# Patient Record
Sex: MALE | Race: WHITE
[De-identification: names, ages, dates, MRNs, and addresses within clinical notes are randomized per-mention and may not be internally consistent; named-entity substitution may affect disease eponyms.]

---

## 2017-04-05 ENCOUNTER — HOSPITAL ENCOUNTER (OUTPATIENT)
Dept: HOSPITAL 10 - E/R | Age: 77
Setting detail: OBSERVATION
LOS: 2 days | Discharge: HOME | End: 2017-04-07
Attending: INTERNAL MEDICINE | Admitting: INTERNAL MEDICINE
Payer: MEDICARE

## 2017-04-05 VITALS
WEIGHT: 202.38 LBS | BODY MASS INDEX: 28.97 KG/M2 | WEIGHT: 202.38 LBS | HEIGHT: 70 IN | BODY MASS INDEX: 28.97 KG/M2 | HEIGHT: 70 IN

## 2017-04-05 VITALS — HEART RATE: 96 BPM

## 2017-04-05 DIAGNOSIS — Z86.73: ICD-10-CM

## 2017-04-05 DIAGNOSIS — E78.5: ICD-10-CM

## 2017-04-05 DIAGNOSIS — L23.9: ICD-10-CM

## 2017-04-05 DIAGNOSIS — I48.0: ICD-10-CM

## 2017-04-05 DIAGNOSIS — Z95.5: ICD-10-CM

## 2017-04-05 DIAGNOSIS — I42.1: ICD-10-CM

## 2017-04-05 DIAGNOSIS — I10: ICD-10-CM

## 2017-04-05 DIAGNOSIS — R07.89: Primary | ICD-10-CM

## 2017-04-05 DIAGNOSIS — Z79.01: ICD-10-CM

## 2017-04-05 DIAGNOSIS — I25.10: ICD-10-CM

## 2017-04-05 DIAGNOSIS — R06.02: ICD-10-CM

## 2017-04-05 DIAGNOSIS — Z79.899: ICD-10-CM

## 2017-04-05 DIAGNOSIS — I25.2: ICD-10-CM

## 2017-04-05 LAB
ABNORMAL IP MESSAGE: 1
ADD SCAN DIFF: NO
ANION GAP SERPL CALC-SCNC: 19 MMOL/L (ref 8–16)
APTT BLD: 33.2 SEC (ref 25–35)
BASOPHILS # BLD AUTO: 0 10^3/UL (ref 0–0.1)
BASOPHILS NFR BLD: 0.4 % (ref 0–2)
BUN SERPL-MCNC: 10 MG/DL (ref 7–20)
CALCIUM SERPL-MCNC: 9.4 MG/DL (ref 8.4–10.2)
CHLORIDE SERPL-SCNC: 104 MMOL/L (ref 97–110)
CK MB SERPL-MCNC: 1.23 NG/ML (ref 0–2.4)
CK SERPL-CCNC: 45 IU/L (ref 23–200)
CO2 SERPL-SCNC: 26 MMOL/L (ref 21–31)
CREAT SERPL-MCNC: 0.73 MG/DL (ref 0.61–1.24)
EOSINOPHIL # BLD: 0 10^3/UL (ref 0–0.5)
EOSINOPHIL NFR BLD: 0.2 % (ref 0–7)
ERYTHROCYTE [DISTWIDTH] IN BLOOD BY AUTOMATED COUNT: 14 % (ref 11.5–14.5)
GLUCOSE SERPL-MCNC: 132 MG/DL (ref 70–220)
HCT VFR BLD CALC: 48.7 % (ref 42–52)
HGB BLD-MCNC: 15.8 G/DL (ref 14–18)
INR PPP: 1.34
LYMPHOCYTES # BLD AUTO: 0.3 10^3/UL (ref 0.8–2.9)
LYMPHOCYTES NFR BLD AUTO: 5.7 % (ref 15–51)
MCH RBC QN AUTO: 29.4 PG (ref 29–33)
MCHC RBC AUTO-ENTMCNC: 32.4 G/DL (ref 32–37)
MCV RBC AUTO: 90.7 FL (ref 82–101)
MONOCYTES # BLD: 0.3 10^3/UL (ref 0.3–0.9)
MONOCYTES NFR BLD: 5.9 % (ref 0–11)
NEUTROPHILS # BLD: 4 10^3/UL (ref 1.6–7.5)
NEUTROPHILS NFR BLD AUTO: 87.6 % (ref 39–77)
NRBC # BLD MANUAL: 0 10^3/UL (ref 0–0)
NRBC BLD QL: 0 /100WBC (ref 0–0)
PLATELET # BLD: 147 10^3/UL (ref 140–415)
PMV BLD AUTO: 11.3 FL (ref 7.4–10.4)
POTASSIUM SERPL-SCNC: 3.6 MMOL/L (ref 3.5–5.1)
PROTHROMBIN TIME: 16.7 SEC (ref 12.2–14.2)
PT RATIO: 1.3
RBC # BLD AUTO: 5.37 10^6/UL (ref 4.7–6.1)
SODIUM SERPL-SCNC: 145 MMOL/L (ref 135–144)
TROPONIN I SERPL-MCNC: < 0.012 NG/ML (ref 0–0.12)
TROPONIN I SERPL-MCNC: < 0.012 NG/ML (ref 0–0.12)
WBC # BLD AUTO: 4.6 10^3/UL (ref 4.8–10.8)

## 2017-04-05 PROCEDURE — 85610 PROTHROMBIN TIME: CPT

## 2017-04-05 PROCEDURE — 85025 COMPLETE CBC W/AUTO DIFF WBC: CPT

## 2017-04-05 PROCEDURE — 83735 ASSAY OF MAGNESIUM: CPT

## 2017-04-05 PROCEDURE — 84484 ASSAY OF TROPONIN QUANT: CPT

## 2017-04-05 PROCEDURE — 82553 CREATINE MB FRACTION: CPT

## 2017-04-05 PROCEDURE — 83880 ASSAY OF NATRIURETIC PEPTIDE: CPT

## 2017-04-05 PROCEDURE — 80048 BASIC METABOLIC PNL TOTAL CA: CPT

## 2017-04-05 PROCEDURE — 93005 ELECTROCARDIOGRAM TRACING: CPT

## 2017-04-05 PROCEDURE — 85730 THROMBOPLASTIN TIME PARTIAL: CPT

## 2017-04-05 PROCEDURE — 36415 COLL VENOUS BLD VENIPUNCTURE: CPT

## 2017-04-05 PROCEDURE — 71010: CPT

## 2017-04-05 PROCEDURE — 82550 ASSAY OF CK (CPK): CPT

## 2017-04-05 PROCEDURE — 76705 ECHO EXAM OF ABDOMEN: CPT

## 2017-04-05 PROCEDURE — 99285 EMERGENCY DEPT VISIT HI MDM: CPT

## 2017-04-05 PROCEDURE — 93306 TTE W/DOPPLER COMPLETE: CPT

## 2017-04-05 NOTE — CONS
Date/Time of Note


Date/Time of Note


DATE: 4/5/17 


TIME: 16:36





Assessment/Plan


Assessment/Plan


Chief Complaint/Hosp Course


1.  chest pain- atypical, but has recent pci/nstemi. trend trop/ekg to r/o for 

ACS.  cont cardiac medications, obtain echo. consider stress testing.  

alternative possibility is gi related/gastroenteritis





- cont ticagelor for now, switched from clopidogrel given rash. however no real 

improvement. can continue ticagelor for now


- cont statin


- cont bb


- obtain 2d echo


- serial trop/ekg





2.  Atrial fibrillation.  Paroxysmal, nonvalvular.  The patient on Eliquis for 

stroke prophylaxis given elevated CHADS-VASc score in the setting of htn, age, 

previous stroke.  


- monitor k/mag, 


- cont po metoprolol and increase for rate control


- d/c dilt given possible rash with medication


- cont tele monitoring





3. Hypertrophic obstructive cardiomyopathy.    


- Would be important to limit heart rate given potential outflow obstruction, 

though blood pressure and patient stable at this time.  Avoid diuretic unless 

needed.





4.  Hypertension


- controlled, cont to monitor





5. Hyperlipidemia


- on statin.





6. Recent cerebrovascular accident. 


-  No focal defects at this time. 


-Continue with anticoagulation as above.





7. Rash- ? drug reaction, most recent med changes are addition of diltiazem and 

clopidogrel (which has now been stopped), will trial off diltiazem as well


- consider derm consult/biopsy


Problems:  





Consultation Date/Type/Reason


Admit Date/Time


4/5/2017


Date of Consultation:  Apr 5, 2017


Type of Consultation:  Cardiology


Reason for Consultation


Chest Pain


Referring Provider:  ASAD VAUGHN MD





Hx of Present Illness


Mr. Glez is a pleasant 76-year-old man with a history of hypertrophic 

obstructive cardiomyopathy with increased LVOT gradient, paroxysmal atrial 

fibrillation,CVA, CAD s/p PCI to RCA 2010, LAD 11/2016.  Pt presents today with 

one day of chest pain and sob. Pt states has had sob since switching from 

clopidogrel to ticagelor given upper extremity rash a week ago.  He notices sob 

occuring at rest mostly, no pnd, orthopnea, edema. lasts a few seconds and comes

/goes. Pt also noted sharp epigastric pain/pressure occurring yesterday on/off 

lasting hours, low level however he states he had decreased appetite but no 

other associated symptom until this am where he had n/v and chest pain.  he 

states he presented to ED for further evaluation.  EKG was reviewed, shows rate 

controlled afib with nonspecfic unchanged TWI, LVH. CXR did not show acute 

abnl.  Trop negative x 1.  pt currently cp free without sob. 





Does also have progressive bue macular rash which has mild itching, this has 

been going on for weeks without relief with change from clopidogrel to 

ticagelor.


Constitutional:  no complaints


Eyes:  no complaints


ENT:  no complaints


Respiratory:  shortness of breath


Cardiovascular:  chest pain


Gastrointestinal:  decreased appetite, nausea, vomiting


Genitourinary:  no complaints


Musculoskeletal:  no complaints


Skin:  pruritis, rash


Neurologic:  no complaints


Endocrine:  no complaints


Psychological:  nl mood/affect, no complaints


Immunologic:  no complaints





Past Medical History





1.  Hypertrophic obstructive cardiomyopathy with known outflow obstruction with 

Valsalva up to 200.


2.  Paroxysmal atrial fibrillation.  On Eliquis therapy for stroke prevention.


3.  Hypertension.


4.  Recent CVA.


5.  Hyperlipidemia.


6.  Coronary artery disease, status post PCI in 2010 with drug-eluting stent in 

the right coronary artery, PCI in 2016 with MONIKA to proximal LAD after NSTEMI





Past Surgical History


Past Surgical Hx:  other (angiogram/stent)





Family History


Significant Family History:  other (no cad)





Social History


Alcohol Use:  none


Smoking Status:  Former smoker


Drug Use:  none





Exam/Review of Systems


Vital Signs


Vitals





 Vital Signs








  Date Time  Temp Pulse Resp B/P Pulse Ox O2 Delivery O2 Flow Rate FiO2


 


4/5/17 15:27  90 12 138/91 98 Room Air  


 


4/5/17 12:36       2 


 


4/5/17 12:18 98.2       











Exam


GENERAL:  No apparent distress.  Alert, oriented x3.


HEENT:  Pupils equal, round, react to light.  Oropharynx was clear.  Moist 

mucous membranes.


NECK:  No JVD.


CARDIOVASCULAR:  Irregularly irregular pulse.  Normal S1, S2. III/VI systolic 

ejection murmur, right upper sternal border.


PULMONARY:  Clear to auscultation, anterior, posterior and bilateral fields.


GASTROINTESTINAL:  Nontender, nondistended.  Normal bowel sounds.


EXTREMITIES:  No cyanosis, clubbing or edema.


NEUROLOGIC:  Nonfocal.





Results


Result Diagram:  


4/5/17 1230                                                                    

            4/5/17 1230





Results 24 hrs





Laboratory Tests








Test


  4/5/17


12:30 4/5/17


13:25


 


White Blood Count 4.6  L 


 


Red Blood Count 5.37   


 


Hemoglobin 15.8   


 


Hematocrit 48.7   


 


Mean Corpuscular Volume 90.7   


 


Mean Corpuscular Hemoglobin 29.4   


 


Mean Corpuscular Hemoglobin


Concent 32.4  


  


 


 


Red Cell Distribution Width 14.0   


 


Platelet Count 147   


 


Mean Platelet Volume 11.3  H 


 


Neutrophils % 87.6  H 


 


Lymphocytes % 5.7  L 


 


Monocytes % 5.9   


 


Eosinophils % 0.2   


 


Basophils % 0.4   


 


Nucleated Red Blood Cells % 0.0   


 


Neutrophils # 4.0   


 


Lymphocytes # 0.3  L 


 


Monocytes # 0.3   


 


Eosinophils # 0.0   


 


Basophils # 0.0   


 


Nucleated Red Blood Cells # 0.0   


 


Sodium Level 145  H 


 


Potassium Level 3.6   


 


Chloride Level 104   


 


Carbon Dioxide Level 26   


 


Anion Gap 19  H 


 


Blood Urea Nitrogen 10   


 


Creatinine 0.73   


 


Glucose Level 132   


 


Calcium Level 9.4   


 


Troponin I < 0.012   


 


Prothrombin Time  16.7  H


 


Prothrombin Time Ratio  1.3  


 


INR International Normalized


Ratio 


  1.34  


 


 


Activated Partial


Thromboplast Time 


  33.2  


 











Procedures


Procedures


cxr images reviewed, no acute abnl





ekg per hpi











STEVAN DURANT Apr 5, 2017 17:04

## 2017-04-05 NOTE — RADRPT
PROCEDURE:   XR Chest. 

 

CLINICAL INDICATION:   Chest pain.

 

TECHNIQUE:   Single frontal view. 

 

COMPARISON:   11/15/2016. 

 

FINDINGS:

The lungs are clear. 

The heart size is normal.  There is calcification in the aorta consistent with atherosclerosis. 

There is no pleural effusion. 

There is no pneumothorax.   

 

IMPRESSION:

1.  Clear lungs.

2.  Atherosclerosis.

3.  Otherwise normal chest x-ray.  

 

RPTAT: QQ

_____________________________________________ 

.Bryan Yoo MD, MD           Date    Time 

Electronically viewed and signed by .Bryan Yoo MD, MD on 04/05/2017 13:13 

 

D:  04/05/2017 13:13  T:  04/05/2017 13:13

.R/

## 2017-04-05 NOTE — ERA
ER Documentation


Chief Complaint


Date/Time


DATE: 4/5/17 


TIME: 13:54


Chief Complaint


chest pain with n/v started yesterday





HPI


This is a 76-year-old male who presents to the emergency room for evaluation of 

chest pain and mild shortness of breath.  The patient did state that he was 

nauseous and vomited once yesterday.  He said his chest pain is in the center 

of his chest and described as a pressure-like sensation.  The patient denied 

any radiation of the pain and denied any aggravating or relieving factors for 

his pain.  He does state that he has had chest pain like this before and he was 

diagnosed with a heart attack.  The patient does have a history of atrial 

fibrillation and is on Xarelto at this time.





ROS


All systems reviewed and are negative except as per history of present illness.





Medications


Home Meds


Active Scripts


Pantoprazole* (Pantoprazole*) 40 Mg Tablet., 40 MG PO DAILY@06 for 30 Days, #

30


   Prov:ASAD VAUGHN MD         11/20/16


Diltiazem Hcl* (Cardizem CD*) 180 Mg Cap.sr.24h, 180 MG PO BID for 30 Days, #60


   Prov:ASAD VAUGHN MD         11/20/16


Apixaban* (Eliquis*) 5 Mg Tablet, 5 MG PO BID for 30 Days, #60 TAB


   Prov:ASAD VAUGHN MD         11/20/16


Reported Medications


Ticagrelor* (Brilinta*) 90 Mg Tablet, 90 MG PO Q12, TAB


   4/5/17


Simvastatin* (Zocor*) 40 Mg Tablet, 40 MG PO QHS, #30 TAB


   4/5/17


Metoprolol Tartrate* (Lopressor*) 50 Mg Tab, 75 MG PO BID, #60 TAB


   11/15/16


Discontinued Reported Medications


Pantoprazole* (Pantoprazole*) 40 Mg Tablet., 40 MG PO AC BREAKFAST, TAB


   11/15/16


Apixaban* (Eliquis*) 5 Mg Tablet, 5 MG PO BID, TAB


   11/15/16


Discontinued Scripts


Metoprolol Succinate* (Toprol XL*) 25 Mg Tab.sr.24h, 75 MG PO BID for 30 Days, #

180


   Prov:ASAD VAUGHN MD         11/20/16


Clopidogrel Bisulfate (Clopidogrel) 75 Mg Tablet, 75 MG PO DAILY for 30 Days, #

30 TAB


   Prov:ASAD VAUGHN MD         11/20/16


Atorvastatin* (Atorvastatin*) 40 Mg Tablet, 40 MG PO HS for 30 Days, #30 TAB


   Prov:ASAD VAUGHN MD         11/20/16


Aspirin* (Aspirin* EC) 81 Mg Tablet.dr, 81 MG PO DAILY for 30 Days, #30


   Prov:ASAD VAUGHN MD         11/20/16


Ranitidine Hcl* (Ranitidine Hcl*) 150 Mg Tablet, 150 MG PO HS for 30 Days, #30 

TAB


   Prov:ASAD VAUGHN MD         5/21/16





Allergies


Allergies:  


Coded Allergies:  


     No Known Allergies (Verified  Allergy, Unknown, 4/5/17)





PMhx/Soc


History of Surgery:  Yes


Anesthesia Reaction:  No


Hx Neurological Disorder:  Yes (stroke)


Hx Respiratory Disorders:  No


Hx Cardiac Disorders:  Yes (STENT PLACEMENT 2008, A-FIB, HYPERTENSION)


Hx Psychiatric Problems:  No


Hx Miscellaneous Medical Probl:  Yes (OBESITY)


Hx Alcohol Use:  Yes


Hx Substance Use:  No


Hx Tobacco Use:  No





Physical Exam


Vitals





Vital Signs








  Date Time  Temp Pulse Resp B/P Pulse Ox O2 Delivery O2 Flow Rate FiO2


 


4/5/17 13:43  84 20 146/91 98 Room Air  


 


4/5/17 12:36      Nasal Cannula 2 


 


4/5/17 12:18 98.2 98 20 158/98 99   








Physical Exam


INITIAL VITAL SIGNS: Reviewed by me


GENERAL:  The patient is well developed and appropriate for usual state of 

health in no apparent distress


HEENT: Pupils equal, round, and reactive to light.  EOMI. There is no scleral 

icterus.


NECK:  C-spine is soft and supple, there is no meningismus.  There is no 

cervical lymphadenopathy.


LUNGS:  Clear to auscultation bilaterally. There are no rales, wheezes or 

rhonchi.


HEART: Irregularly irregular rhythm no murmurs, clicks, rubs or gallops.


ABDOMEN:  Soft, non-tender, non-distended.  There are bowel sounds in all four 

quadrants. No rebound or guarding.


EXTREMITIES:  There is no peripheral cyanosis or edema.  No focal swelling or 

erythema.


NEUROLOGICAL:  The patient moves all four extremities with 5/5 strength.  

Cranial nerves II - XII are intact. Normal gait. Alert and oriented


SKIN:  There is no apparent rash or petechiae.


HEME/LYMPHATIC:  There is no evidence of excessive bruising or lymphedema.


PSYCHIATRIC:  The patient does not appear anxious or depressed.


Result Diagram:  


4/5/17 1230                                                                    

            4/5/17 1230





Results 24 hrs





 Laboratory Tests








Test


  4/5/17


12:30


 


White Blood Count 4.610^3/ul 


 


Red Blood Count 5.3710^6/ul 


 


Hemoglobin 15.8g/dl 


 


Hematocrit 48.7% 


 


Mean Corpuscular Volume 90.7fl 


 


Mean Corpuscular Hemoglobin 29.4pg 


 


Mean Corpuscular Hemoglobin


Concent 32.4g/dl 


 


 


Red Cell Distribution Width 14.0% 


 


Platelet Count 75783^3/UL 


 


Mean Platelet Volume 11.3fl 


 


Neutrophils % 87.6% 


 


Lymphocytes % 5.7% 


 


Monocytes % 5.9% 


 


Eosinophils % 0.2% 


 


Basophils % 0.4% 


 


Nucleated Red Blood Cells % 0.0/100WBC 


 


Neutrophils # 4.010^3/ul 


 


Lymphocytes # 0.310^3/ul 


 


Monocytes # 0.310^3/ul 


 


Eosinophils # 0.010^3/ul 


 


Basophils # 0.010^3/ul 


 


Nucleated Red Blood Cells # 0.010^3/ul 


 


Sodium Level 145mmol/L 


 


Potassium Level 3.6mmol/L 


 


Chloride Level 104mmol/L 


 


Carbon Dioxide Level 26mmol/L 


 


Anion Gap 19 


 


Blood Urea Nitrogen 10mg/dl 


 


Creatinine 0.73mg/dl 


 


Glucose Level 132mg/dl 


 


Calcium Level 9.4mg/dl 


 


Troponin I < 0.012ng/ml 








 Current Medications








 Medications


  (Trade)  Dose


 Ordered  Sig/Graciela


 Route


 PRN Reason  Start Time


 Stop Time Status Last Admin


Dose Admin


 


 Ondansetron HCl


  (Zofran Inj)  4 mg  ER BRIDGE PRN


 IV


 NAUSEA AND/OR VOMITING  4/5/17 14:00


 4/6/17 13:59   


 


 


 Acetaminophen


  (Tylenol Tab)  650 mg  ER BRIDGE PRN


 PO


 MILD PAIN/FEVER  4/5/17 14:00


 4/6/17 13:59   


 


 


 Nitroglycerin


  (Nitroglycerin


  (Sl Tab) 0.4 Mg)  1 tab  ONCE  ONCE


 SL


   4/5/17 14:00


 4/5/17 14:01   


 











Procedures/MDM


Chest X-ray 1V Interpreted by me:


Soft Tissue:                                               No acute 

abnormalities


Bones:                                                    No acute abnormalities


Mediastinum/Cardiac Silhouette/Lungs:     [No acute abnormalities]


EKG: 


Rate/Rhythm:             Atrial fibrillation


QRS, ST, T-waves:    [No changes consistent w/ acute ischemia]


Impression:      [No evidence of ischemia or arrhythmia]


EKG: #2


Rate/Rhythm:             Atrial fibrillation


QRS, ST, T-waves:    [No changes consistent w/ acute ischemia]


Impression:      Possible lateral ischemia, not acute





This 76-year-old male presents to the emergency room for evaluation of chest 

pain or palpitations.  This patient has had a previous non-ST elevation 

myocardial infarction.  He states his pain is similar to his previous pain.  

This patient did have an EKG which showed atrial fibrillation which is rate 

controlled, no acute ischemia.  A repeat EKG does not show any evolving 

changes.  The patient had an x-ray done which does not show any acute fluid 

overload.  Lab work does not reveal an elevated troponin.  However given this 

patient's age and previous cardiac history this patient will be placed in for 

admission at this time for serial troponins and possible cardiac evaluation.  I 

have contacted his primary care physician, Dr. Vaughn who is okay with 

our plan of care at this time.  This patient is hemodynamically stable will be 

placed on the telemetry floor.





Departure


Diagnosis:  


 Primary Impression:  


 Chest pain


 Additional Impression:  


 A-fib


Condition:  Fair











DIPAK POLLARD DO Apr 5, 2017 13:57

## 2017-04-06 VITALS — RESPIRATION RATE: 16 BRPM | SYSTOLIC BLOOD PRESSURE: 127 MMHG | DIASTOLIC BLOOD PRESSURE: 95 MMHG

## 2017-04-06 VITALS — DIASTOLIC BLOOD PRESSURE: 75 MMHG | SYSTOLIC BLOOD PRESSURE: 117 MMHG | RESPIRATION RATE: 19 BRPM

## 2017-04-06 VITALS — DIASTOLIC BLOOD PRESSURE: 95 MMHG | SYSTOLIC BLOOD PRESSURE: 136 MMHG | RESPIRATION RATE: 18 BRPM

## 2017-04-06 VITALS — SYSTOLIC BLOOD PRESSURE: 131 MMHG | RESPIRATION RATE: 18 BRPM | DIASTOLIC BLOOD PRESSURE: 77 MMHG

## 2017-04-06 VITALS — HEART RATE: 84 BPM

## 2017-04-06 VITALS — HEART RATE: 83 BPM

## 2017-04-06 VITALS — HEART RATE: 161 BPM

## 2017-04-06 VITALS — HEART RATE: 90 BPM

## 2017-04-06 VITALS — HEART RATE: 96 BPM

## 2017-04-06 VITALS — HEART RATE: 103 BPM

## 2017-04-06 VITALS — RESPIRATION RATE: 20 BRPM | SYSTOLIC BLOOD PRESSURE: 141 MMHG | DIASTOLIC BLOOD PRESSURE: 84 MMHG

## 2017-04-06 VITALS — HEART RATE: 106 BPM

## 2017-04-06 LAB
ANION GAP SERPL CALC-SCNC: 10 MMOL/L (ref 8–16)
BNP SERPL-MCNC: 2450 PG/ML (ref 0–450)
BUN SERPL-MCNC: 11 MG/DL (ref 7–20)
CALCIUM SERPL-MCNC: 8.8 MG/DL (ref 8.4–10.2)
CHLORIDE SERPL-SCNC: 105 MMOL/L (ref 97–110)
CK MB SERPL-MCNC: 1.4 NG/ML (ref 0–2.4)
CK SERPL-CCNC: 78 IU/L (ref 23–200)
CO2 SERPL-SCNC: 27 MMOL/L (ref 21–31)
CREAT SERPL-MCNC: 0.7 MG/DL (ref 0.61–1.24)
GLUCOSE SERPL-MCNC: 97 MG/DL (ref 70–220)
MAGNESIUM SERPL-MCNC: 1.9 MG/DL (ref 1.7–2.5)
POTASSIUM SERPL-SCNC: 3.4 MMOL/L (ref 3.5–5.1)
SODIUM SERPL-SCNC: 139 MMOL/L (ref 135–144)
TROPONIN I SERPL-MCNC: 0.01 NG/ML (ref 0–0.12)
TROPONIN I SERPL-MCNC: 0.01 NG/ML (ref 0–0.12)

## 2017-04-06 RX ADMIN — PANTOPRAZOLE SODIUM SCH MG: 40 TABLET, DELAYED RELEASE ORAL at 06:05

## 2017-04-06 RX ADMIN — APIXABAN SCH MG: 5 TABLET, FILM COATED ORAL at 08:43

## 2017-04-06 RX ADMIN — SUCRALFATE SCH GM: 1 SUSPENSION ORAL at 16:31

## 2017-04-06 RX ADMIN — SUCRALFATE SCH GM: 1 SUSPENSION ORAL at 15:15

## 2017-04-06 RX ADMIN — APIXABAN SCH MG: 5 TABLET, FILM COATED ORAL at 21:04

## 2017-04-06 RX ADMIN — METOPROLOL TARTRATE SCH MG: 100 TABLET, FILM COATED ORAL at 21:05

## 2017-04-06 RX ADMIN — SUCRALFATE SCH GM: 1 SUSPENSION ORAL at 21:04

## 2017-04-06 NOTE — CONS
DATE OF ADMISSION: 04/05/2017

DATE OF CONSULTATION:  

 

 

 

TYPE OF CONSULTATION:  Gastroenterology. 

 

REFERRING PHYSICIAN:  Dr. Kike Mata 

 

Dear Dr. Mata, 

 

Thank you for asking me to evaluate your patient, who is a 76-year-old gentleman with a history of c
oronary artery disease, status post percutaneous coronary artery intervention to the RCA and LAD in 
11/2006, was placed on antiplatelet agent, now has been complaining of retrosternal discomfort and a
lso epigastric pain with vomiting.  Vomiting of 1 day duration.  The patient also developed upper ex
tremity rash which he feels it's all related to medication.  He has good bowel movement.  No GI blee
ding.  ____ afternoon when he received the cocktail of Carafate and simethicone, he feels much liz
r.  His pain has disappeared and was able to eat 100% without any problem.

 

REVIEW OF SYSTEMS:  Otherwise negative.

 

MEDICATIONS:  He is on:

1.  Protonix.

2.  Toprol. 

3.  Diltiazem. 

4.  Eliquis.

5.  Brilinta.

6.  ____.

7.  Metoprolol.

8.  Simvastatin. 

 

PHYSICAL EXAMINATION:

GENERAL:  Well built, nourished, not in distress.

VITAL SIGNS:  Stable.

HEENT:  Unremarkable.

NECK:  Supple.  No thyromegaly, no lymphadenopathy.

CARDIOVASCULAR:  No murmur, gallop or click.

LUNGS:  Clear.

ABDOMEN:  Benign.

EXTREMITIES:  No edema.

CENTRAL NERVOUS SYSTEM:  Grossly within normal limits.  

 

The patient also was evaluated by the cardiologist Dr. Camacho Lemus and was of opinion maybe the 
upper GI tract is playing a role in his discomfort.

 

IMPRESSION:

1.  Atypical chest pain.  Maybe the gastroesophageal reflux disease is atypical chest pain.  So far,
 the cardiac workup has been negative.  Rule out GERD.

2.  Epigastric discomfort, dyspepsia, rule out gastritis.

3.  Atrial fibrillation.

4.  Hypertrophic obstructive cardiomyopathy.

5.  Hypertension.

6.  Hyperlipidemia.

7.  Rashes on the upper extremities from the medication.

8.  Constant belching.  

 

PLAN:  Continue PPI, continue Carafate.  The patient feels much better now, and if despite that he c
ontinues to have the symptoms, then will proceed with EGD.  This patient, I would treat him conserva
tively.

 

 

Dictated By: KRISTINE LEVIN MD

 

PJ/NTS

DD:    04/06/2017 18:53:19

DT:    04/06/2017 20:21:32

Conf#: [id number]

DID#:  [id number]

CC: KIKE MATA MD; CAMACHO LEMUS MD;*EndCC*

## 2017-04-06 NOTE — RADRPT
PROCEDURE:   US Abdomen (right upper quadrant). 

 

CLINICAL INDICATION:   Right upper quadrant abdomen pain.  

 

TECHNIQUE:   Multiple real-time longitudinal and transverse images of the right upper quadrant of th
e abdomen were acquired utilizing a curved array transducer. Images were reviewed on a high-resoluti
on PACS workstation. 

 

COMPARISON:   None 

 

FINDINGS:

The liver is normal in size and diffusely increased in echogenicity consistent with fatty metamorpho
sis. 

There is no focal hepatic lesion.   Color Doppler and pulsed Doppler sonography demonstrate normal a
ntegrade flow in the portal vein. 

The gallbladder is normal with no stones or wall thickening.  There is no pericholecystic fluid leonardo
ection.

The bile ducts are normal with the common bile duct measuring 5.0 mm in diameter.  

The visualized portions of the pancreas are unremarkable with obscuration of the tail of the pancrea
s.  

No free fluid is present.  

The right kidney measures 11.0 cm.  There is normal  echogenicity of the right kidney.  There is no 
perinephric fluid collection.  No hydronephrosis, mass, or calculus is seen.   

 

IMPRESSION:

1.  Fatty metamorphosis of the liver.

2.  Otherwise normal right upper quadrant abdomen ultrasound. 

 

RPTAT: QQ

_____________________________________________ 

.Bryan Yoo MD, MD           Date    Time 

Electronically viewed and signed by .Bryan Yoo MD, MD on 04/06/2017 16:50 

 

D:  04/06/2017 16:50  T:  04/06/2017 16:50

.R/

## 2017-04-06 NOTE — HP
DATE OF ADMISSION: 04/05/2017

 

CHIEF COMPLAINT AND HISTORY OF PRESENT ILLNESS:  The patient is a 76-year-old gentleman, well known 
to me, with a previous history of coronary artery disease, status post percutaneous coronary interve
ntion to the RCA and LAD, LAD, done in November of 2016, presenting with a 1-week history of increas
ing epigastric pain with vomiting on the day of admission, and also chest pain.  The patient had dev
eloped an upper extremity rash and had discussed with Dr. Pierre and the medication was switched fr
om clopidogrel to ticagrelor.  The patient also has been complaining of increasing shortness of lisseth
th.  The patient was last hospitalized in November of 2016 for atrial fibrillation with rapid ventri
cular response.  Previous hospitalization was 2 months before that, for atypical chest pain secondar
y to acute gastritis. In May of 2016 the patient had left homonymous hemianopsia, from which he comp
letely recovered.

 

REVIEW OF SYSTEMS:

HEAD:  No history of headaches, focal weakness, or numbness.

EYES:  No blurry vision or glaucoma.  Wears glasses.

ENT:  Noncontributory.

NECK:  No history of thyroid disease.

CHEST:  No bronchitis, hay fever, or asthma.  The patient does not smoke.

HEART:  As above.  A history of Lexiscan done several months back which showed no evidence of a perf
usion defect.  History of hypertrophic obstructive cardiomyopathy with paroxysmal atrial fibrillatio
n. He was hospitalized in November with acute MI with atrial fibrillation, when he underwent PTCA of
 the proximal LAD with a drug-eluting stent.

GASTROINTESTINAL:  No constipation, diarrhea or change in bowel habits.  No history of hematemesis o
r melena.  He does complain of recurrent epigastric discomfort after eating.  Sometimes feels that t
he food is not going down, with peristalsis, but no definite dysphagia.

GENITOURINARY:  No dysuria, hematuria or kidney stones.

 

ALLERGIES:  NO KNOWN ALLERGIES.

 

MEDICATIONS:

1.  Protonix 40 mg p.o. daily.

2.  Toprol-XL 25 mg p.o. b.i.d.

3.  Diltiazem.

4.  Protonix 40 mg p.o. daily.

5.  Eliquis 5 mg b.i.d.

6.  Brilinta 90 mg q. 12.

7.  Cardizem 100 mg b.i.d.

8.  Metoprolol tartrate 75 mg b.i.d.

9.  Simvastatin 40 mg p.o. daily.

 

PHYSICAL EXAMINATION:

GENERAL:  The patient is an average-built male, who is presently in no acute distress.

VITAL SIGNS:  Heart rate 106 per minute, blood pressure 136/95, O2 saturation 100% on room air.

HEENT:  Head normocephalic.  No pallor, cyanosis, or icterus.  Tongue is moist.

NECK:  Supple.  No thyromegaly, bruits or lymphadenopathy.

LUNGS:  Clinically clear.

HEART:  S1, S2, with no rubs or gallops.

ABDOMEN:  Obese, nontender.  No hepatosplenomegaly.

EXTREMITIES:  No edema.  Pedals are poorly palpable.  Homans sign is negative.

NEUROLOGIC:  No localizing or lateralizing signs.

RECTAL:  Deferred at the patient's request.

 

LABORATORY DATA:  Initial WBC count 4.6, hematocrit 48.7, platelet count 147,000.  Sodium 144, potas
sium 3.6, BUN 10, creatinine 0.73.  Troponin 0.01.  BNP is 2450.  Chest x-ray shows lung fields are 
clear, evidence of atherosclerosis and calcification of the aorta.

 

IMPRESSION:

1.  Atypical chest pain with underlying coronary artery disease, likely has significant component of
 GI, including regurgitation and esophagitis. So far, no evidence of acute myocardial infarction.

2.  Atrial fibrillation, paroxysmal, non-valvular.

3.  Hypertrophic obstructive cardiomyopathy.

4.  Hypertension.

5.  Hyperlipidemia.

6.  RASH ON UPPER EXTREMITIES, PROBABLY RELATED TO MEDICATION.

 

PLAN:  Will start the patient on Carafate 1 gram p.o. q.i.d.  Obtain an abdominal ultrasound.  Will 
also request a GI consultation with Dr. Lr.  Consider an upper endoscopy.  Follow recommendation
s from cardiology by Dr. Lemus.  Will also consider getting a dermatology consultation.

 

 

Dictated By: ASAD VAUGHN MD, SR/RENE

DD:    04/06/2017 12:01:57

DT:    04/06/2017 13:19:19

Conf#: [id number]

DID#:  [id number]

## 2017-04-06 NOTE — RADRPT
Echocardiogram Report

 

Patient Name:  JEANIE SIDDIQI   Gender:       Male

MRN:           [id number]         Accession #:  [id number]

Birth Date:    1940     Study Date:   06-Apr-2017

Sonographer:   JOSE Krueger [facility]    Location:     [id number]

 

Ref. Physician: CAMACHO DURANT

Quality: Adequate

 

Procedures: Transthoracic echocardiogram with complete 2D, M-Mode, and 

doppler examination.

Indications: Chest Pain.

 

2D/M Mode                          Doppler

Measurement  Value  Normal Ranges  Measurement    Value  Normal Ranges

LVIDd 2D     6.2    3.5 - 5.6 cm   LESLI Vmax       1.8    cm2

LVIDs 2D     4.4    2.1 - 4.1 cm   LESLI VTI        1.8    cm2

LVPWd 2D     1.1    0.6 - 1.1 cm   AV Mean Neto    2.1    m/sec

IVSd 2D      1.2    0.6 - 1.1 cm   AV Mean PG     22.5   mmHg

AoR Diam 2D  3.3    2.0 - 3.7 cm   AV Peak Neto    3.5    m/sec

EDV 2D       191.9  cm3            AV Peak PG     47.9   mmHg

ESV 2D       84.9   cm3            AV VTI         65.6   cm

LA Dimen 2D  5.3    2.3 - 4.0 cm   AI Peak PG     45.5   mmHg

LVOT Diam    2.3    cm             AI Peak Neto    3.4    m/sec

AI PHT         864.3  msec

LVOT Mean Neto  1.1    m/sec

LVOT Mean PG   5.3    mmHg

LVOT Peak Neto  1.5    m/sec

LVOT VTI       31.6   cm

MV PHT         115.1  msec

MV Peak Neto    1.8    m/sec

MV Peak PG     13.3   mmHg

MV Mean Neto    1.0    m/sec

MV Mean PG     5.3    mmHg

MV PHT         115.1  msec

MV VTI         39.5   cm

MVA PHT        1.9    cm2

MVA VTI        3.2    cm

TR Peak Neto    2.7    m/sec

TR Peak PG     28.4   mmHg

RVSP           31.0   mmHg

 

Findings

Left Ventricle: Hyperdynamic left ventricular systolic function.  Severe 

asymmetric septal hypertrophy.  Mild concentric left ventricular 

hypertrophy.  Mild enlargement of left ventricle cavity.  Ejection 

fraction is visually estimated at 70 %.  Resting left ventricular 

outflow tract velocity 3.46 m/sec.  Resting left ventricular outflow 

tract gradient 47.9 mmHg.

Right Ventricle: Normal right ventricular size.  Normal right 

ventricular systolic function.

Left Atrium: There is severe enlargement of left atrium.

Right Atrium: The right atrium is normal in size.

Mitral Valve: Mild mitral leaflet calcification.  Moderate mitral 

annular calcification.  Anterior flail Mitral Valve leaflet.  Mild 

mitral valve regurgitation.

Aortic Valve: Aortic cusps appear moderately calcified.  Mild to 

moderate aortic valve regurgitation.

Tricuspid Valve: Normal appearance of the tricuspid valve.  Estimated 

peak PA systolic pressure 31 mmHg.  There is mild tricuspid 

regurgitation.

Pulmonic Valve: Normal pulmonic valve appearance.

Pericardium: Normal pericardium with no significant pericardial effusion.

Aorta: Normal aortic root.

IVC: Normal size and normal respiratory collapse consistent with normal 

right atrial pressure.

 

Conclusions

Hyperdynamic left ventricular systolic function.  Severe asymmetric 

septal hypertrophy.  Mild concentric left ventricular hypertrophy.  Mild 

enlargement of left ventricle cavity.  Ejection fraction is visually 

estimated at 70 %.  Resting left ventricular outflow tract velocity 3.46 

m/sec.  Resting left ventricular outflow tract gradient 47.9 mmHg.

Normal right ventricular size.  Normal right ventricular systolic 

function.

There is severe enlargement of left atrium.

Mild mitral leaflet calcification.  Moderate mitral annular 

calcification.  Anterior flail Mitral Valve leaflet.  Mild mitral valve 

regurgitation.

Aortic cusps appear moderately calcified.  Mild to moderate aortic valve 

regurgitation.

Normal appearance of the tricuspid valve.  Estimated peak PA systolic 

pressure 31 mmHg.  There is mild tricuspid regurgitation.

Normal size and normal respiratory collapse consistent with normal right 

atrial pressure.

Normal pericardium with no significant pericardial effusion.

No Vegetation, masses, or thrombi seen.

 

Electronically Signed By:

Camacho Durant

06-Apr-2017 20:48:40  -0700

 

Patient Name: JEANIE SIDDIQI

MRN: [id number]

Study Date: 06-Apr-2017

 

[id number]

## 2017-04-06 NOTE — CONS
Date/Time of Note


Date/Time of Note


DATE: 4/6/17 


TIME: 11:06





Assessment/Plan


Assessment/Plan


Chief Complaint/Hosp Course


1.  chest pain- negative for ACS, films reviewed, had proximal LAD stent. very 

early for ISR, no e/o stent thrombosis given unchanged ekg/no trop elevation.  

cont cardiac medications, obtain echo.  alternative possibility is gi related/

gastroenteritis. 





- cont ticagelor for now, switched from clopidogrel given rash. however no real 

improvement. can continue ticagelor for now


- cont statin


- cont bb


- would not add nitrate given HCM


- obtain 2d echo


- add gi cocktail, if above negative consider gi consultation. 





2.  Atrial fibrillation.  Paroxysmal, nonvalvular.  The patient on Eliquis for 

stroke prophylaxis given elevated CHADS-VASc score in the setting of htn, age, 

previous stroke.  


- monitor k/mag, 


- cont po metoprolol and increase for rate control


- d/c dilt given possible rash with medication


- cont tele monitoring





3. Hypertrophic obstructive cardiomyopathy.    


- Would be important to limit heart rate given potential outflow obstruction, 

though blood pressure and patient stable at this time.  Avoid diuretic unless 

needed.





4.  Hypertension


- controlled, cont to monitor





5. Hyperlipidemia


- on statin.





6. Recent cerebrovascular accident. 


-  No focal defects at this time. 


-Continue with anticoagulation as above.





7. Rash- ? drug reaction, most recent med changes are addition of diltiazem and 

clopidogrel (which has now been stopped), will trial off diltiazem as well


- d/c dilt


- consider derm consult/biopsy


Problems:  





Consultation Date/Type/Reason


Admit Date/Time


Apr 5, 2017 at 14:07


Initial Consult Date


4/5/17


Type of Consultation:  Cardiology


Referring Provider:  ASAD VAUGHN MD





24 HR Interval Summary


Free Text/Dictation


pt r/o for ACS with serial neg trop. however cont to have constant low level 

chest/epigastric pain. no n/v, tolerating po. no sob, diaphoresis





tele reviewed: afib rates 80s-100s





Exam/Review of Systems


Vital Signs


Vitals





 Vital Signs








  Date Time  Temp Pulse Resp B/P Pulse Ox O2 Delivery O2 Flow Rate FiO2


 


4/6/17 09:00  161      


 


4/6/17 07:30 97.6  18 136/95 100   


 


4/5/17 22:01      Room Air  


 


4/5/17 12:36       2 














 Intake and Output   


 


 4/5/17 4/5/17 4/6/17





 14:59 22:59 06:59


 


Intake Total   600 ml


 


Balance   600 ml











Exam


GENERAL:  No apparent distress.  Alert, oriented x3.


HEENT:  Pupils equal, round, react to light.  Oropharynx was clear.  Moist 

mucous membranes.


NECK:  No JVD.


CARDIOVASCULAR:  Irregularly irregular pulse.  Normal S1, S2. III/VI systolic 

ejection murmur, right upper sternal border.


PULMONARY:  Clear to auscultation, anterior, posterior and bilateral fields.


GASTROINTESTINAL:  Nontender, nondistended.  Normal bowel sounds.


EXTREMITIES:  No cyanosis, clubbing or edema.


NEUROLOGIC:  Nonfocal.





Results


Result Diagram:  


4/5/17 1230                                                                    

            4/6/17 0634





Results 24 hrs





Laboratory Tests








Test


  4/5/17


12:30 4/5/17


13:25 4/5/17


18:30 4/6/17


00:30


 


White Blood Count 4.6  L   


 


Red Blood Count 5.37     


 


Hemoglobin 15.8     


 


Hematocrit 48.7     


 


Mean Corpuscular Volume 90.7     


 


Mean Corpuscular Hemoglobin 29.4     


 


Mean Corpuscular Hemoglobin


Concent 32.4  


  


  


  


 


 


Red Cell Distribution Width 14.0     


 


Platelet Count 147     


 


Mean Platelet Volume 11.3  H   


 


Neutrophils % 87.6  H   


 


Lymphocytes % 5.7  L   


 


Monocytes % 5.9     


 


Eosinophils % 0.2     


 


Basophils % 0.4     


 


Nucleated Red Blood Cells % 0.0     


 


Neutrophils # 4.0     


 


Lymphocytes # 0.3  L   


 


Monocytes # 0.3     


 


Eosinophils # 0.0     


 


Basophils # 0.0     


 


Nucleated Red Blood Cells # 0.0     


 


Sodium Level 145  H   


 


Potassium Level 3.6     


 


Chloride Level 104     


 


Carbon Dioxide Level 26     


 


Anion Gap 19  H   


 


Blood Urea Nitrogen 10     


 


Creatinine 0.73     


 


Glucose Level 132     


 


Calcium Level 9.4     


 


Troponin I < 0.012    < 0.012   0.014  


 


Prothrombin Time  16.7  H  


 


Prothrombin Time Ratio  1.3    


 


INR International Normalized


Ratio 


  1.34  


  


  


 


 


Activated Partial


Thromboplast Time 


  33.2  


  


  


 


 


Creatine Kinase   45   78  


 


Creatine Kinase Index   2.7   1.8  


 


Creatinine Kinase MB (Mass)   1.23   1.40  














Test


  4/6/17


06:34 


  


  


 


 


Sodium Level 139     


 


Potassium Level 3.4  L   


 


Chloride Level 105     


 


Carbon Dioxide Level 27     


 


Anion Gap 10  #   


 


Blood Urea Nitrogen 11     


 


Creatinine 0.70     


 


Glucose Level 97     


 


Calcium Level 8.8     


 


Magnesium Level 1.9     


 


Troponin I 0.012     


 


B-Type Natriuretic Peptide 2450  H   











Medications


Medications





 Current Medications


Apixaban (Eliquis) 5 mg BID PO  Last administered on 4/6/17at 08:43; Admin Dose 

5 MG;  Start 4/6/17 at 09:00


Pantoprazole (Protonix Tab) 40 mg DAILY@06 PO  Last administered on 4/6/17at 06:

05; Admin Dose 40 MG;  Start 4/6/17 at 06:00


Ticagrelor (Brilinta) 90 mg Q12 PO  Last administered on 4/6/17at 08:42; Admin 

Dose 90 MG;  Start 4/6/17 at 09:00


Atorvastatin Calcium (Lipitor) 20 mg DAILY@21 PO ;  Start 4/6/17 at 21:00


Zolpidem Tartrate (Ambien) 5 mg HS  PRN PO INSOMNIA;  Start 4/6/17 at 00:00


Metoprolol Tartrate (Lopressor) 100 mg BID PO ;  Start 4/6/17 at 21:00











STEVAN DURANT Apr 6, 2017 11:09

## 2017-04-07 VITALS — SYSTOLIC BLOOD PRESSURE: 126 MMHG | DIASTOLIC BLOOD PRESSURE: 69 MMHG | RESPIRATION RATE: 20 BRPM

## 2017-04-07 VITALS — SYSTOLIC BLOOD PRESSURE: 107 MMHG | RESPIRATION RATE: 18 BRPM | DIASTOLIC BLOOD PRESSURE: 68 MMHG

## 2017-04-07 VITALS — SYSTOLIC BLOOD PRESSURE: 113 MMHG | RESPIRATION RATE: 18 BRPM | DIASTOLIC BLOOD PRESSURE: 74 MMHG

## 2017-04-07 VITALS — SYSTOLIC BLOOD PRESSURE: 116 MMHG | RESPIRATION RATE: 20 BRPM | DIASTOLIC BLOOD PRESSURE: 57 MMHG

## 2017-04-07 VITALS — HEART RATE: 94 BPM

## 2017-04-07 VITALS — HEART RATE: 77 BPM

## 2017-04-07 VITALS — HEART RATE: 93 BPM

## 2017-04-07 VITALS — HEART RATE: 82 BPM

## 2017-04-07 LAB
ABNORMAL IP MESSAGE: 1
ADD SCAN DIFF: NO
ANION GAP SERPL CALC-SCNC: 16 MMOL/L (ref 8–16)
BASOPHILS # BLD AUTO: 0 10^3/UL (ref 0–0.1)
BASOPHILS NFR BLD: 0.6 % (ref 0–2)
BUN SERPL-MCNC: 12 MG/DL (ref 7–20)
CALCIUM SERPL-MCNC: 8.8 MG/DL (ref 8.4–10.2)
CHLORIDE SERPL-SCNC: 103 MMOL/L (ref 97–110)
CO2 SERPL-SCNC: 25 MMOL/L (ref 21–31)
CREAT SERPL-MCNC: 0.75 MG/DL (ref 0.61–1.24)
EOSINOPHIL # BLD: 0 10^3/UL (ref 0–0.5)
EOSINOPHIL NFR BLD: 0.6 % (ref 0–7)
ERYTHROCYTE [DISTWIDTH] IN BLOOD BY AUTOMATED COUNT: 13.6 % (ref 11.5–14.5)
GLUCOSE SERPL-MCNC: 90 MG/DL (ref 70–220)
HCT VFR BLD CALC: 46.7 % (ref 42–52)
HGB BLD-MCNC: 15.2 G/DL (ref 14–18)
LYMPHOCYTES # BLD AUTO: 0.3 10^3/UL (ref 0.8–2.9)
LYMPHOCYTES NFR BLD AUTO: 8.8 % (ref 15–51)
MAGNESIUM SERPL-MCNC: 2.1 MG/DL (ref 1.7–2.5)
MCH RBC QN AUTO: 29.8 PG (ref 29–33)
MCHC RBC AUTO-ENTMCNC: 32.5 G/DL (ref 32–37)
MCV RBC AUTO: 91.6 FL (ref 82–101)
MONOCYTES # BLD: 0.4 10^3/UL (ref 0.3–0.9)
MONOCYTES NFR BLD: 12.8 % (ref 0–11)
NEUTROPHILS # BLD: 2.5 10^3/UL (ref 1.6–7.5)
NEUTROPHILS NFR BLD AUTO: 76.9 % (ref 39–77)
NRBC # BLD MANUAL: 0 10^3/UL (ref 0–0)
NRBC BLD QL: 0 /100WBC (ref 0–0)
PLATELET # BLD: 118 10^3/UL (ref 140–415)
PMV BLD AUTO: 11.9 FL (ref 7.4–10.4)
POTASSIUM SERPL-SCNC: 3.3 MMOL/L (ref 3.5–5.1)
RBC # BLD AUTO: 5.1 10^6/UL (ref 4.7–6.1)
SODIUM SERPL-SCNC: 141 MMOL/L (ref 135–144)
WBC # BLD AUTO: 3.3 10^3/UL (ref 4.8–10.8)

## 2017-04-07 RX ADMIN — METOPROLOL TARTRATE SCH MG: 100 TABLET, FILM COATED ORAL at 09:29

## 2017-04-07 RX ADMIN — PANTOPRAZOLE SODIUM SCH MG: 40 TABLET, DELAYED RELEASE ORAL at 05:17

## 2017-04-07 RX ADMIN — SUCRALFATE SCH GM: 1 SUSPENSION ORAL at 09:26

## 2017-04-07 RX ADMIN — SUCRALFATE SCH GM: 1 SUSPENSION ORAL at 12:27

## 2017-04-07 RX ADMIN — APIXABAN SCH MG: 5 TABLET, FILM COATED ORAL at 09:28

## 2017-04-07 NOTE — RADRPT
Vent Rate: 103 bpm

RR Interval: 0 msec

AR Interval: 0 msec

QRS Duration: 126 msec

QT Interval: 378 msec

QTC Interval: 495 msec

P-R-T Axis: 0 - -21 - 113 degrees

 

 Atrial fibrillation with rapid ventricular response

 Left ventricular hypertrophy with QRS widening

 T wave abnormality, consider lateral ischemia or digitalis effect

 Abnormal ECG

 

Electronically Signed By: Jovanni Maxwell

[id number]

## 2017-04-07 NOTE — DS
DATE OF ADMISSION: 04/05/2017

DATE OF DISCHARGE: 04/07/2017

 

FINAL DIAGNOSES:

1.  Atypical chest pain, likely secondary to reflux esophagitis.

2.  Underlying coronary artery disease status post PTCA of the left anterior descending.

3.  Atrial fibrillation, paroxysmal, non-valvular.

4.  Hypertrophic obstructive cardiomyopathy.

5.  Hypertension.

6.  Hyperlipidemia.

7.  Allergic dermatitis.

 

HOSPITAL COURSE:  The patient is a 76-year-old gentleman well known to me from previous history of c
oronary artery disease status post PTCA of the RCA and LAD presenting with epigastric pain and vomit
ing on the day of admission chest pain.  The patient was admitted from the emergency room, repeat se
rial troponins were negative.  The patient was seen by Dr. Lemus from cardiology standpoint.  The
re was no evidence of acute myocardial infarction.  In addition to pantoprazole, Carafate was added 
with complete resolution of symptoms.  The patient also seen in GI consultation, Dr. Lr who michelle
mmended endoscopy if the patient did not improve over the course of the next 4 weeks.  All the medic
ations were continued, except for diltiazem.

 

DISCHARGE MEDICATIONS: Include:

1.  Protonix 40 mg daily.

2.  Metoprolol 100 mg p.o. b.i.d.

3.  Eliquis 5 mg b.i.d.

4.  Brilinta 90 mg q.12h.

5.  Simvastatin 40 mg p.o. daily.

6.  Carafate 1 gram p.o. q.i.d. 

 

The patient will be followed in the office over the course of next 2 weeks.

 

DISCHARGE CONDITION:  Much improved.

 

 

Dictated By: ASAD VAUGHN MD, SR/NTS

DD:    04/07/2017 14:36:18

DT:    04/07/2017 15:12:18

Conf#: [id number]

DID#:  [id number]

## 2017-04-07 NOTE — CONS
Date/Time of Note


Date/Time of Note


DATE: 4/7/17 


TIME: 18:08





Assessment/Plan


Assessment/Plan


Chief Complaint/Hosp Course


1.  chest pain- negative for ACS, films reviewed, had proximal LAD stent. very 

early for ISR, no e/o stent thrombosis given unchanged ekg/no trop elevation.  

cont cardiac medications, likely more gi related, resolved with gi cocktail. gi 

consulting





- cont ticagelor, after 6 mo post pci will swtich back to asa 81mg daily


- cont statin


- cont bb


- would not add nitrate given HCM


- cont gi cocktail and f/u with gi 





2.  Atrial fibrillation.  Paroxysmal, nonvalvular.  The patient on Eliquis for 

stroke prophylaxis given elevated CHADS-VASc score in the setting of htn, age, 

previous stroke.  


- monitor k/mag, 


- cont po metoprolol and increase for rate control


- d/c dilt given possible rash with medication


- cont tele monitoring





3. Hypertrophic obstructive cardiomyopathy.    


- Would be important to limit heart rate given potential outflow obstruction, 

though blood pressure and patient stable at this time.  Avoid diuretic unless 

needed.





4.  Hypertension


- controlled, cont to monitor





5. Hyperlipidemia


- on statin.





6. Recent cerebrovascular accident. 


-  No focal defects at this time. 


-Continue with anticoagulation as above.





7. Rash- ? drug reaction, most recent med changes are addition of diltiazem and 

clopidogrel (which has now been stopped), will trial off diltiazem as well


- d/c dilt


- consider derm consult/biopsy if no improvement


Problems:  





Consultation Date/Type/Reason


Admit Date/Time


Apr 5, 2017 at 14:07


Initial Consult Date


4/5/17


Type of Consultation:  Cardiology


Referring Provider:  ASAD VAUGHN MD





24 HR Interval Summary


Free Text/Dictation


pt seen early this am.  states pain relieved with gi cocktail. no cp/sob/

palpitations. tolerating po no n/v. no palpitations, dizziness





tele reviewed: afib hrs 90s-100s.





Exam/Review of Systems


Vital Signs


Vitals





 Vital Signs








  Date Time  Temp Pulse Resp B/P Pulse Ox O2 Delivery O2 Flow Rate FiO2


 


4/7/17 12:11  82      


 


4/7/17 11:29 97.5  18 113/74 96   


 


4/5/17 22:01      Room Air  


 


4/5/17 12:36       2 














 Intake and Output   


 


 4/6/17 4/6/17 4/7/17





 15:00 23:00 07:00


 


Intake Total  1500 ml 450 ml


 


Balance  1500 ml 450 ml











Exam


GENERAL:  No apparent distress.  Alert, oriented x3.


HEENT:  Pupils equal, round, react to light.  Oropharynx was clear.  Moist 

mucous membranes.


NECK:  No JVD.


CARDIOVASCULAR:  Irregularly irregular pulse.  Normal S1, S2. III/VI systolic 

ejection murmur, right upper sternal border.


PULMONARY:  Clear to auscultation, anterior, posterior and bilateral fields.


GASTROINTESTINAL:  Nontender, nondistended.  Normal bowel sounds.


EXTREMITIES:  No cyanosis, clubbing or edema.


NEUROLOGIC:  Nonfocal.





Results


Result Diagram:  


4/7/17 0658                                                                    

            4/7/17 0658





Results 24 hrs





Laboratory Tests








Test


  4/7/17


06:58


 


White Blood Count 3.3  #L


 


Red Blood Count 5.10  


 


Hemoglobin 15.2  


 


Hematocrit 46.7  


 


Mean Corpuscular Volume 91.6  


 


Mean Corpuscular Hemoglobin 29.8  


 


Mean Corpuscular Hemoglobin


Concent 32.5  


 


 


Red Cell Distribution Width 13.6  


 


Platelet Count 118  L


 


Mean Platelet Volume 11.9  H


 


Neutrophils % 76.9  


 


Lymphocytes % 8.8  L


 


Monocytes % 12.8  H


 


Eosinophils % 0.6  


 


Basophils % 0.6  


 


Nucleated Red Blood Cells % 0.0  


 


Neutrophils # 2.5  


 


Lymphocytes # 0.3  L


 


Monocytes # 0.4  


 


Eosinophils # 0.0  


 


Basophils # 0.0  


 


Nucleated Red Blood Cells # 0.0  


 


Sodium Level 141  


 


Potassium Level 3.3  L


 


Chloride Level 103  


 


Carbon Dioxide Level 25  


 


Anion Gap 16  


 


Blood Urea Nitrogen 12  


 


Creatinine 0.75  


 


Glucose Level 90  


 


Calcium Level 8.8  


 


Magnesium Level 2.1  











Procedures


Procedures


abd us report reviewed in emr








1.  Fatty metamorphosis of the liver.


2.  Otherwise normal right upper quadrant abdomen ultrasound.











STEVAN DURANT Apr 7, 2017 18:11

## 2017-06-01 ENCOUNTER — HOSPITAL ENCOUNTER (INPATIENT)
Dept: HOSPITAL 10 - E/R | Age: 77
LOS: 2 days | Discharge: HOME | DRG: 310 | End: 2017-06-03
Attending: INTERNAL MEDICINE | Admitting: INTERNAL MEDICINE
Payer: MEDICARE

## 2017-06-01 VITALS — TEMPERATURE: 97.5 F

## 2017-06-01 VITALS
BODY MASS INDEX: 32.99 KG/M2 | HEIGHT: 66 IN | WEIGHT: 205.25 LBS | HEIGHT: 66 IN | BODY MASS INDEX: 32.99 KG/M2 | WEIGHT: 205.25 LBS

## 2017-06-01 VITALS — SYSTOLIC BLOOD PRESSURE: 135 MMHG | RESPIRATION RATE: 20 BRPM | DIASTOLIC BLOOD PRESSURE: 106 MMHG

## 2017-06-01 VITALS — HEART RATE: 112 BPM

## 2017-06-01 DIAGNOSIS — Z86.73: ICD-10-CM

## 2017-06-01 DIAGNOSIS — E78.5: ICD-10-CM

## 2017-06-01 DIAGNOSIS — I10: ICD-10-CM

## 2017-06-01 DIAGNOSIS — I48.91: Primary | ICD-10-CM

## 2017-06-01 DIAGNOSIS — I42.8: ICD-10-CM

## 2017-06-01 LAB
ABNORMAL IP MESSAGE: 1
ADD SCAN DIFF: NO
ANION GAP SERPL CALC-SCNC: 12 MMOL/L (ref 8–16)
APTT BLD: 30 SEC (ref 25–35)
BASOPHILS # BLD AUTO: 0 10^3/UL (ref 0–0.1)
BASOPHILS NFR BLD: 1.1 % (ref 0–2)
BNP SERPL-MCNC: 1900 PG/ML (ref 0–450)
BUN SERPL-MCNC: 10 MG/DL (ref 7–20)
CALCIUM SERPL-MCNC: 9 MG/DL (ref 8.4–10.2)
CHLORIDE SERPL-SCNC: 114 MMOL/L (ref 97–110)
CO2 SERPL-SCNC: 24 MMOL/L (ref 21–31)
CREAT SERPL-MCNC: 0.63 MG/DL (ref 0.61–1.24)
EOSINOPHIL # BLD: 0 10^3/UL (ref 0–0.5)
EOSINOPHIL NFR BLD: 0.3 % (ref 0–7)
ERYTHROCYTE [DISTWIDTH] IN BLOOD BY AUTOMATED COUNT: 15.2 % (ref 11.5–14.5)
GLUCOSE SERPL-MCNC: 107 MG/DL (ref 70–220)
HCT VFR BLD CALC: 49.9 % (ref 42–52)
HGB BLD-MCNC: 16.5 G/DL (ref 14–18)
INR PPP: 1.22
LYMPHOCYTES # BLD AUTO: 0.6 10^3/UL (ref 0.8–2.9)
LYMPHOCYTES NFR BLD AUTO: 15.9 % (ref 15–51)
MCH RBC QN AUTO: 30.4 PG (ref 29–33)
MCHC RBC AUTO-ENTMCNC: 33.1 G/DL (ref 32–37)
MCV RBC AUTO: 91.9 FL (ref 82–101)
MONOCYTES # BLD: 0.4 10^3/UL (ref 0.3–0.9)
MONOCYTES NFR BLD: 11.5 % (ref 0–11)
NEUTROPHILS # BLD: 2.6 10^3/UL (ref 1.6–7.5)
NEUTROPHILS NFR BLD AUTO: 70.7 % (ref 39–77)
NRBC # BLD MANUAL: 0 10^3/UL (ref 0–0)
NRBC BLD QL: 0 /100WBC (ref 0–0)
PLATELET # BLD: 183 10^3/UL (ref 140–415)
PMV BLD AUTO: 11.7 FL (ref 7.4–10.4)
POTASSIUM SERPL-SCNC: 3.5 MMOL/L (ref 3.5–5.1)
PROTHROMBIN TIME: 15.5 SEC (ref 12.2–14.2)
PT RATIO: 1.2
RBC # BLD AUTO: 5.43 10^6/UL (ref 4.7–6.1)
SODIUM SERPL-SCNC: 146 MMOL/L (ref 135–144)
TROPONIN I SERPL-MCNC: < 0.012 NG/ML (ref 0–0.12)
WBC # BLD AUTO: 3.7 10^3/UL (ref 4.8–10.8)

## 2017-06-01 PROCEDURE — 82550 ASSAY OF CK (CPK): CPT

## 2017-06-01 PROCEDURE — 93005 ELECTROCARDIOGRAM TRACING: CPT

## 2017-06-01 PROCEDURE — 85730 THROMBOPLASTIN TIME PARTIAL: CPT

## 2017-06-01 PROCEDURE — 83735 ASSAY OF MAGNESIUM: CPT

## 2017-06-01 PROCEDURE — 71010: CPT

## 2017-06-01 PROCEDURE — 80048 BASIC METABOLIC PNL TOTAL CA: CPT

## 2017-06-01 PROCEDURE — 83880 ASSAY OF NATRIURETIC PEPTIDE: CPT

## 2017-06-01 PROCEDURE — 82553 CREATINE MB FRACTION: CPT

## 2017-06-01 PROCEDURE — 96374 THER/PROPH/DIAG INJ IV PUSH: CPT

## 2017-06-01 PROCEDURE — 36415 COLL VENOUS BLD VENIPUNCTURE: CPT

## 2017-06-01 PROCEDURE — 85610 PROTHROMBIN TIME: CPT

## 2017-06-01 PROCEDURE — 96375 TX/PRO/DX INJ NEW DRUG ADDON: CPT

## 2017-06-01 PROCEDURE — 84484 ASSAY OF TROPONIN QUANT: CPT

## 2017-06-01 PROCEDURE — 85025 COMPLETE CBC W/AUTO DIFF WBC: CPT

## 2017-06-01 RX ADMIN — METOPROLOL TARTRATE SCH MG: 100 TABLET, FILM COATED ORAL at 22:35

## 2017-06-01 NOTE — ERA
ER Documentation


Chief Complaint


Date/Time


DATE: 6/1/17 


TIME: 18:16


Chief Complaint


CHEST PAIN X4 DAYS, WORSE TODAY, MILD SOB





HPI


This is a 76-year-old male presents to the emergency room for evaluation of 

chest pain, palpitations and mild shortness of breath.  This patient does have 

a history of atrial fibrillation.  He states that he has not taken his aspirin 

today and he came to the ER for evaluation.  This patient states that he has 

felt his heart beating faster than normal.  He denies any aggravating or 

relieving factors for symptoms and came to the ER for evaluation.





ROS


All systems reviewed and are negative except as per history of present illness.





Medications


Home Meds


Active Scripts


Sucralfate* (Carafate*) 1 Gm/10 Ml Susp, 1 GM GTB QID for 30 Days, #120


   Prov:ASAD VAUGHN MD         4/7/17


Magaldrate/Simethicone* (Mag-Al Plus Suspension*) 30 Ml Oral.susp, 30 ML PO Q6H 

Y for GASTROINTESTINAL UPSET for 30 Days, #60


   Prov:ASAD VAUGHN MD         4/7/17


Metoprolol Tartrate* (Lopressor*) 100 Mg Tablet, 100 MG PO BID for 30 Days, #60 

TAB


   Prov:ASAD VAUGHN MD         4/7/17


Pantoprazole* (Pantoprazole*) 40 Mg Tablet.dr, 40 MG PO DAILY@06 for 30 Days, #

30


   Prov:ASAD VAUGHN MD         11/20/16


Apixaban* (Eliquis*) 5 Mg Tablet, 5 MG PO BID for 30 Days, #60 TAB


   Prov:ASAD VAUGHN MD         11/20/16


Reported Medications


Ticagrelor* (Brilinta*) 90 Mg Tablet, 90 MG PO Q12, TAB


   4/5/17


Simvastatin* (Zocor*) 40 Mg Tablet, 40 MG PO QHS, #30 TAB


   4/5/17


Metoprolol Tartrate* (Lopressor*) 50 Mg Tab, 75 MG PO BID, #60 TAB


   11/15/16





Allergies


Allergies:  


Coded Allergies:  


     No Known Allergies (Verified  Allergy, Unknown, 4/5/17)





PMhx/Soc


History of Surgery:  Yes


Anesthesia Reaction:  No


Hx Neurological Disorder:  No


Hx Respiratory Disorders:  Yes (SOB)


Hx Cardiac Disorders:  Yes ( AFib)


Hx Psychiatric Problems:  No


Hx Alcohol Use:  Yes


Hx Substance Use:  No


Hx Tobacco Use:  Yes


Smoking Status:  Never smoker





Physical Exam


Vitals





Vital Signs








  Date Time  Temp Pulse Resp B/P Pulse Ox O2 Delivery O2 Flow Rate FiO2


 


6/1/17 17:16  101 19 134/92 100 Room Air  


 


6/1/17 16:48  114 19 177/36 99 Nasal Cannula 2.0 


 


6/1/17 16:45      Nasal Cannula 2 


 


6/1/17 16:30 96.5 110 18 79/122 99   








Physical Exam


INITIAL VITAL SIGNS: Reviewed by me


GENERAL:  The patient is well developed and appropriate for usual state of 

health in no apparent distress


HEENT: Pupils equal, round, and reactive to light.  EOMI. There is no scleral 

icterus.


NECK:  C-spine is soft and supple, there is no meningismus.  There is no 

cervical lymphadenopathy.


LUNGS:  Clear to auscultation bilaterally. There are no rales, wheezes or 

rhonchi.


HEART: Irregularly irregular rhythm, no murmurs, clicks, rubs or gallops.


ABDOMEN:  Soft, non-tender, non-distended.  There are bowel sounds in all four 

quadrants. No rebound or guarding.


EXTREMITIES:  There is no peripheral cyanosis or edema.  No focal swelling or 

erythema.


NEUROLOGICAL:  The patient moves all four extremities with 5/5 strength.  

Cranial nerves II - XII are intact. Normal gait. Alert and oriented


SKIN:  There is no apparent rash or petechiae.


HEME/LYMPHATIC:  There is no evidence of excessive bruising or lymphedema.


PSYCHIATRIC:  The patient does not appear anxious or depressed.


Result Diagram:  


6/1/17 1655                                                                    

            6/1/17 1715





Results 24 hrs





 Laboratory Tests








Test


  6/1/17


16:55 6/1/17


17:15


 


White Blood Count 3.710^3/ul  


 


Red Blood Count 5.4310^6/ul  


 


Hemoglobin 16.5g/dl  


 


Hematocrit 49.9%  


 


Mean Corpuscular Volume 91.9fl  


 


Mean Corpuscular Hemoglobin 30.4pg  


 


Mean Corpuscular Hemoglobin


Concent 33.1g/dl 


  


 


 


Red Cell Distribution Width 15.2%  


 


Platelet Count 95467^3/UL  


 


Mean Platelet Volume 11.7fl  


 


Neutrophils % 70.7%  


 


Lymphocytes % 15.9%  


 


Monocytes % 11.5%  


 


Eosinophils % 0.3%  


 


Basophils % 1.1%  


 


Nucleated Red Blood Cells % 0.0/100WBC  


 


Neutrophils # 2.610^3/ul  


 


Lymphocytes # 0.610^3/ul  


 


Monocytes # 0.410^3/ul  


 


Eosinophils # 0.010^3/ul  


 


Basophils # 0.010^3/ul  


 


Nucleated Red Blood Cells # 0.010^3/ul  


 


Prothrombin Time  15.5Sec 


 


Prothrombin Time Ratio  1.2 


 


INR International Normalized


Ratio 


  1.22 


 


 


Activated Partial


Thromboplast Time 


  30.0Sec 


 


 


Sodium Level  146mmol/L 


 


Potassium Level  3.5mmol/L 


 


Chloride Level  114mmol/L 


 


Carbon Dioxide Level  24mmol/L 


 


Anion Gap  12 


 


Blood Urea Nitrogen  10mg/dl 


 


Creatinine  0.63mg/dl 


 


Glucose Level  107mg/dl 


 


Calcium Level  9.0mg/dl 


 


Troponin I  < 0.012ng/ml 


 


B-Type Natriuretic Peptide  1900PG/ML 








 Current Medications








 Medications


  (Trade)  Dose


 Ordered  Sig/Graciela


 Route


 PRN Reason  Start Time


 Stop Time Status Last Admin


Dose Admin


 


 Diltiazem HCl


  (Cardizem Iv)  10 mg  ONCE  ONCE


 IV


   6/1/17 17:00


 6/1/17 17:01 DC 6/1/17 17:13


 


 


 Aspirin


  (Aspirin)  324 mg  ONCE  STAT


 PO


   6/1/17 17:35


 6/1/17 17:44 DC 6/1/17 17:46


 


 


 Aspirin 81 mg  81 mg  STK-MED ONCE


 .ROUTE


   6/1/17 17:45


 6/1/17 17:46 DC  


 


 


 Sodium Chloride


  (NS)  1,000 ml @ 


 80 mls/hr  [id number]


 IV


   6/1/17 18:11


 6/2/17 06:40   


 


 


 Ondansetron HCl


  (Zofran Inj)  4 mg  ER BRIDGE PRN


 IV


 NAUSEA AND/OR VOMITING  6/1/17 18:30


 6/2/17 18:29   


 


 


 Acetaminophen


  (Tylenol Tab)  650 mg  ER BRIDGE PRN


 PO


 MILD PAIN/FEVER  6/1/17 18:30


 6/2/17 18:29   


 











Procedures/MDM


EKG: 


Rate/Rhythm:             Atrial fibrillation with rapid ventricular response


QRS, ST, T-waves:    [No changes consistent w/ acute ischemia]


Impression:      Atrial fibrillation with rapid ventricular response


Chest X-ray 1V Interpreted by me:


Soft Tissue:                                               No acute 

abnormalities


Bones:                                                    No acute abnormalities


Mediastinum/Cardiac Silhouette/Lungs:     [No acute abnormalities]








This 76-year-old male presents to the emergency room for evaluation of heart 

palpitations and minor shortness of breath.  When I evaluated this patient I 

did note that he was in atrial fibrillation with rapid ventricular response.  

Patient had a heart rate of 122 bpm.  He was hemodynamically stable and did not 

appear to be in any severe distress.  The patient was given 10 mg of IV 

Cardizem and the patient now has rate controlled atrial fibrillation with a 

rate of 92 bpm.  He is hemodynamically stable at this time.  I have talked to 

this patient's admitting physician, Dr. Vaughn who recommends the patient 

be admitted.  This patient's cardiologist, Dr. wood is at bedside and 

agrees with the patient being admitted.  The patient has a negative troponin at 

this time.  And the patient will be placed on the telemetry floor.





Critical Care: Excluding all billable procedures


   Time:             35 minutes


   Treatments/Evaluations:   Close monitoring and treatment of unstable vital 

signs, cardiorespiratory, and neurologic status, while maintaining tight 

balance of fluid, respiratory, and cardiac interventions.





Departure


Diagnosis:  


 Primary Impression:  


 Atrial fibrillation with RVR


 Additional Impressions:  


 Chest pain


 Hyperchloremia


Condition:  Stable











DIPAK POLLARD DO Jun 1, 2017 18:19

## 2017-06-01 NOTE — RADRPT
PROCEDURE:   CHEST 1VW

 

CLINICAL INDICATION:  Chest pain

 

TECHNIQUE:   Single frontal view of the chest was obtained

 

COMPARISON:   04/05/2017 

 

FINDINGS:

 

The cardiac size is mildly enlarged.

Aortic vascular calcifications are demonstrated.  

There is no pulmonary vascular congestion.

The lungs are clear. No consolidation, effusion, or pneumothorax. 

Mild degenerative changes of the visualized osseous structures are visualized.

 

IMPRESSION:

 

1. No acute cardiopulmonary process.

2. Atherosclerosis with mild cardiomegaly. 

 

RPTAT:PP

_____________________________________________ 

.Singh Chavez MD, MD           Date    Time 

Electronically viewed and signed by .Singh Chavez MD, MD on 06/01/2017 17:18 

 

D:  06/01/2017 17:18  T:  06/01/2017 17:18

.V/

## 2017-06-01 NOTE — CONS
Date/Time of Note


Date/Time of Note


DATE: 6/1/17 


TIME: 23:42





Assessment/Plan


Assessment/Plan


Chief Complaint/Hosp Course


1.  chest pain- atypical, but has recent pci/nstemi. trend trop/ekg to r/o for 

ACS.  cont cardiac medications. could be from uncontrolled afib as well, cont 

with rate control 





- asa 324 x 1 given in ED, cont asa 81mg daily. 


- off dapt given elevated bleeding risk, > 6 mo post plavix/ticagrelor therapy 


- cont statin


- cont bb


- serial trop/ekg





2.  Atrial fibrillation.  Paroxysmal, nonvalvular.  The patient on Eliquis for 

stroke prophylaxis given elevated CHADS-VASc score in the setting of htn, age, 

previous stroke.  needs improved rate control 


- cont metop 100mg po bid


- add po dilt 120 cd daily, titrate as needed.


- cont tele monitoring





3. Hypertrophic obstructive cardiomyopathy.    


- Would be important to limit heart rate given potential outflow obstruction, 

though blood pressure and patient stable at this time.  Avoid dehydration, 

nitrates





4.  Hypertension


- elevated diastolic


- cont to monitor may need added therapy 





5. Hyperlipidemia


- on statin.





6. h/o cerebrovascular accident. 


-  No focal defects at this time. 


- Continue with anticoagulation as above.


Problems:  





Consultation Date/Type/Reason


Admit Date/Time


Jun 1, 2017 at 18:12


Date of Consultation:  Jun 1, 2017


Type of Consultation:  Cardiology


Reason for Consultation


Chest pain, afib


Referring Provider:  ASAD VAUGHN MD





Hx of Present Illness





Mr. Glez is a pleasant 76-year-old man with a history of HOCM, increased LVOT 

gradient, paroxysmal atrial fibrillation,CVA, CAD s/p PCI to RCA 2010, LAD 11/ 2016.  Pt presents today with one day of increased chest pain. states occured 

yesteday for a few hours, then improved, but has lasted all day since late this 

am.  pain is dull mid chest, no radiation, moderate tosevere. + sweaty, no n/v, 

palpitations, sob dizziness, fainting. pt denies any fevers, chills. no bleeding

, tolerating eliquis, states has not been taking aspirin.  Pt found in [facility] ED 

to have afib with RVR, given iv dilt with improved hr, pt states chest pain now 

resolved. pt denies taking sln at home.  





 EKG was reviewed, shows rate afib with RVRwith nonspecfic unchanged TWI, LVH. 

CXR images reviewed did not show acute abnl.  Trop negative x 1.


Constitutional:  no complaints


Eyes:  no complaints


ENT:  no complaints


Respiratory: no complaints


Cardiovascular:  chest pain


Gastrointestinal:  no complaints


Genitourinary:  no complaints


Musculoskeletal:  no complaints


Skin:  no complaints


Neurologic:  no complaints


Endocrine:  no complaints


Psychological:  nl mood/affect, no complaints


Immunologic:  no complaints





Past Medical History





1.  Hypertrophic obstructive cardiomyopathy with known outflow obstruction with 

Valsalva up to 200.


2.  Paroxysmal atrial fibrillation.  On Eliquis therapy for stroke prevention.


3.  Hypertension.


4.  Recent CVA.


5.  Hyperlipidemia.


6.  Coronary artery disease, status post PCI in 2010 with drug-eluting stent in 

the right coronary artery, PCI in 2016 with MONIKA to proximal LAD after NSTEMI





Past Surgical History


Past Surgical Hx:  other





Family History


Significant Family History:  other (no early cad)





Social History


Alcohol Use:  none


Smoking Status:  Never smoker


Drug Use:  none





Exam/Review of Systems


Vital Signs


Vitals





 Vital Signs








  Date Time  Temp Pulse Resp B/P Pulse Ox O2 Delivery O2 Flow Rate FiO2


 


6/1/17 21:01  112      


 


6/1/17 21:00 97.5  20 135/106 98   


 


6/1/17 20:22      Nasal Cannula 2.0 











Exam





GENERAL:  No apparent distress.  Alert, oriented x3.


HEENT:  Pupils equal, round, react to light.  Oropharynx was clear.  Moist 

mucous membranes.


NECK:  No JVD.


CARDIOVASCULAR:  Irregularly irregular pulse.  Normal S1, S2. III/VI systolic 

ejection murmur, right upper sternal border.


PULMONARY:  Clear to auscultation, anterior, posterior and bilateral fields.


GASTROINTESTINAL:  Nontender, nondistended.  Normal bowel sounds.


EXTREMITIES:  No cyanosis, clubbing or edema.


NEUROLOGIC:  Nonfocal.





Results


Result Diagram:  


6/1/17 1655                                                                    

            6/1/17 1715





Results 24 hrs





Laboratory Tests








Test


  6/1/17


16:55 6/1/17


17:15


 


White Blood Count 3.7  L 


 


Red Blood Count 5.43   


 


Hemoglobin 16.5   


 


Hematocrit 49.9   


 


Mean Corpuscular Volume 91.9   


 


Mean Corpuscular Hemoglobin 30.4   


 


Mean Corpuscular Hemoglobin


Concent 33.1  


  


 


 


Red Cell Distribution Width 15.2  H 


 


Platelet Count 183  # 


 


Mean Platelet Volume 11.7  H 


 


Neutrophils % 70.7   


 


Lymphocytes % 15.9   


 


Monocytes % 11.5  H 


 


Eosinophils % 0.3   


 


Basophils % 1.1   


 


Nucleated Red Blood Cells % 0.0   


 


Neutrophils # 2.6   


 


Lymphocytes # 0.6  L 


 


Monocytes # 0.4   


 


Eosinophils # 0.0   


 


Basophils # 0.0   


 


Nucleated Red Blood Cells # 0.0   


 


Prothrombin Time  15.5  H


 


Prothrombin Time Ratio  1.2  


 


INR International Normalized


Ratio 


  1.22  


 


 


Activated Partial


Thromboplast Time 


  30.0  


 


 


Sodium Level  146  H


 


Potassium Level  3.5  


 


Chloride Level  114  H


 


Carbon Dioxide Level  24  


 


Anion Gap  12  


 


Blood Urea Nitrogen  10  


 


Creatinine  0.63  


 


Glucose Level  107  


 


Calcium Level  9.0  


 


Troponin I  < 0.012  


 


B-Type Natriuretic Peptide  1900  H











Medications


Medications





 Current Medications


Metoprolol Tartrate (Lopressor) 100 mg BID PO  Last administered on 6/1/17at 22:

35; Admin Dose 100 MG;  Start 6/1/17 at 22:00


Sucralfate (Carafate) 1 gm QID PO ;  Start 6/2/17 at 09:00


Atorvastatin Calcium (Lipitor) 20 mg QHS PO ;  Start 6/2/17 at 21:00


Apixaban (Eliquis) 5 mg BID PO ;  Start 6/2/17 at 09:00


Pantoprazole (Protonix Tab) 40 mg DAILY@06 PO ;  Start 6/2/17 at 06:00


Zolpidem Tartrate (Ambien) 5 mg HS  PRN PO INSOMNIA;  Start 6/1/17 at 21:30


Ondansetron HCl (Zofran Inj) 4 mg Q4H  PRN IV NAUSEA AND/OR VOMITING Last 

administered on 6/1/17at 21:54; Admin Dose 4 MG;  Start 6/1/17 at 21:30


Nitroglycerin (Nitroglycerin (Sl Tab) 0.4 Mg) 1 tab Q5M  PRN SL ANGINA;  Start 6 /1/17 at 21:30


Morphine Sulfate (morphine) 4 mg Q4H  PRN IV SEVERE PAIN LEVEL 7-10 Last 

administered on 6/1/17at 21:54; Admin Dose 4 MG;  Start 6/1/17 at 22:00





Procedures


Procedures


per [facility]











STEVAN DRUANT Jun 1, 2017 23:52

## 2017-06-02 VITALS — RESPIRATION RATE: 18 BRPM | DIASTOLIC BLOOD PRESSURE: 117 MMHG | SYSTOLIC BLOOD PRESSURE: 173 MMHG

## 2017-06-02 VITALS — RESPIRATION RATE: 18 BRPM | SYSTOLIC BLOOD PRESSURE: 111 MMHG | DIASTOLIC BLOOD PRESSURE: 71 MMHG

## 2017-06-02 VITALS — RESPIRATION RATE: 16 BRPM | DIASTOLIC BLOOD PRESSURE: 117 MMHG | SYSTOLIC BLOOD PRESSURE: 186 MMHG

## 2017-06-02 VITALS — SYSTOLIC BLOOD PRESSURE: 148 MMHG | DIASTOLIC BLOOD PRESSURE: 98 MMHG | HEART RATE: 99 BPM

## 2017-06-02 VITALS — DIASTOLIC BLOOD PRESSURE: 61 MMHG | RESPIRATION RATE: 18 BRPM | SYSTOLIC BLOOD PRESSURE: 120 MMHG

## 2017-06-02 VITALS — HEART RATE: 130 BPM

## 2017-06-02 VITALS — HEART RATE: 141 BPM

## 2017-06-02 VITALS — HEART RATE: 86 BPM

## 2017-06-02 VITALS — RESPIRATION RATE: 17 BRPM | SYSTOLIC BLOOD PRESSURE: 160 MMHG | DIASTOLIC BLOOD PRESSURE: 108 MMHG

## 2017-06-02 VITALS — SYSTOLIC BLOOD PRESSURE: 142 MMHG | DIASTOLIC BLOOD PRESSURE: 95 MMHG | RESPIRATION RATE: 18 BRPM

## 2017-06-02 VITALS — HEART RATE: 95 BPM

## 2017-06-02 VITALS — HEART RATE: 93 BPM

## 2017-06-02 VITALS — SYSTOLIC BLOOD PRESSURE: 114 MMHG | DIASTOLIC BLOOD PRESSURE: 76 MMHG | RESPIRATION RATE: 16 BRPM

## 2017-06-02 VITALS — HEART RATE: 96 BPM

## 2017-06-02 VITALS — HEART RATE: 84 BPM

## 2017-06-02 VITALS — HEART RATE: 97 BPM

## 2017-06-02 LAB
ABNORMAL IP MESSAGE: 1
ADD SCAN DIFF: NO
ANION GAP SERPL CALC-SCNC: 15 MMOL/L (ref 8–16)
BASOPHILS # BLD AUTO: 0 10^3/UL (ref 0–0.1)
BASOPHILS NFR BLD: 0.6 % (ref 0–2)
BUN SERPL-MCNC: 13 MG/DL (ref 7–20)
CALCIUM SERPL-MCNC: 8.9 MG/DL (ref 8.4–10.2)
CHLORIDE SERPL-SCNC: 105 MMOL/L (ref 97–110)
CK MB SERPL-MCNC: 1.33 NG/ML (ref 0–2.4)
CK SERPL-CCNC: 30 IU/L (ref 23–200)
CO2 SERPL-SCNC: 27 MMOL/L (ref 21–31)
CREAT SERPL-MCNC: 0.56 MG/DL (ref 0.61–1.24)
EOSINOPHIL # BLD: 0 10^3/UL (ref 0–0.5)
EOSINOPHIL NFR BLD: 0 % (ref 0–7)
ERYTHROCYTE [DISTWIDTH] IN BLOOD BY AUTOMATED COUNT: 15.1 % (ref 11.5–14.5)
GLUCOSE SERPL-MCNC: 110 MG/DL (ref 70–220)
HCT VFR BLD CALC: 47.1 % (ref 42–52)
HGB BLD-MCNC: 15.2 G/DL (ref 14–18)
LYMPHOCYTES # BLD AUTO: 0.3 10^3/UL (ref 0.8–2.9)
LYMPHOCYTES NFR BLD AUTO: 10 % (ref 15–51)
MAGNESIUM SERPL-MCNC: 1.9 MG/DL (ref 1.7–2.5)
MCH RBC QN AUTO: 29.7 PG (ref 29–33)
MCHC RBC AUTO-ENTMCNC: 32.3 G/DL (ref 32–37)
MCV RBC AUTO: 92 FL (ref 82–101)
MONOCYTES # BLD: 0.3 10^3/UL (ref 0.3–0.9)
MONOCYTES NFR BLD: 9.7 % (ref 0–11)
NEUTROPHILS # BLD: 2.6 10^3/UL (ref 1.6–7.5)
NEUTROPHILS NFR BLD AUTO: 79.4 % (ref 39–77)
NRBC # BLD MANUAL: 0 10^3/UL (ref 0–0)
NRBC BLD QL: 0 /100WBC (ref 0–0)
PLATELET # BLD: 127 10^3/UL (ref 140–415)
PMV BLD AUTO: 11.1 FL (ref 7.4–10.4)
POTASSIUM SERPL-SCNC: 3.7 MMOL/L (ref 3.5–5.1)
RBC # BLD AUTO: 5.12 10^6/UL (ref 4.7–6.1)
SODIUM SERPL-SCNC: 143 MMOL/L (ref 135–144)
TROPONIN I SERPL-MCNC: < 0.012 NG/ML (ref 0–0.12)
WBC # BLD AUTO: 3.3 10^3/UL (ref 4.8–10.8)

## 2017-06-02 RX ADMIN — SUCRALFATE SCH GM: 1 TABLET ORAL at 13:19

## 2017-06-02 RX ADMIN — SUCRALFATE SCH GM: 1 TABLET ORAL at 08:59

## 2017-06-02 RX ADMIN — METOPROLOL TARTRATE SCH MG: 100 TABLET, FILM COATED ORAL at 09:00

## 2017-06-02 RX ADMIN — APIXABAN SCH MG: 5 TABLET, FILM COATED ORAL at 09:00

## 2017-06-02 RX ADMIN — METOPROLOL TARTRATE SCH MG: 100 TABLET, FILM COATED ORAL at 20:40

## 2017-06-02 RX ADMIN — PANTOPRAZOLE SODIUM SCH MG: 40 TABLET, DELAYED RELEASE ORAL at 06:01

## 2017-06-02 RX ADMIN — ASPIRIN SCH MG: 81 TABLET, COATED ORAL at 08:59

## 2017-06-02 RX ADMIN — LORAZEPAM PRN MG: 0.5 TABLET ORAL at 00:31

## 2017-06-02 RX ADMIN — SUCRALFATE SCH GM: 1 TABLET ORAL at 20:40

## 2017-06-02 RX ADMIN — DILTIAZEM HYDROCHLORIDE SCH MG: 120 CAPSULE, EXTENDED RELEASE ORAL at 09:00

## 2017-06-02 RX ADMIN — APIXABAN SCH MG: 5 TABLET, FILM COATED ORAL at 20:40

## 2017-06-02 RX ADMIN — LORAZEPAM PRN MG: 0.5 TABLET ORAL at 09:07

## 2017-06-02 RX ADMIN — SUCRALFATE SCH GM: 1 TABLET ORAL at 17:57

## 2017-06-02 NOTE — RADRPT
Vent Rate: 94 bpm

RR Interval: 0 msec

WI Interval: 0 msec

QRS Duration: 118 msec

QT Interval: 398 msec

QTC Interval: 497 msec

P-R-T Axis: 0 - -9 - 0 degrees

 

 Atrial fibrillation

 Left ventricular hypertrophy with QRS widening

 Marked ST abnormality, possible inferior subendocardial injury

 Prolonged QT

 Abnormal ECG

 

Electronically Signed By: Rickey Solorzano

[id number]

## 2017-06-02 NOTE — HP
DATE OF ADMISSION: 06/01/2017

 

CHIEF COMPLAINT AND HISTORY OF PRESENT ILLNESS:  The patient is a 74-year-old gentleman with a histo
ry of paroxysmal atrial fibrillation, status post CVA, and had PCI to RCA and LAD, presenting with r
ecurrent chest pain retrosternal.  It started the day before and then he has been having some diapho
resis and also mild nausea.  The patient was evaluated in the emergency room, found to have AFib wit
h rapid ventricular response, and patient was admitted to telemetry.

 

REVIEW OF SYSTEMS:

HEAD:  No history of headaches, focal weakness, or numbness.

EYES:  No blurry vision or glaucoma.  The patient wears glasses.

ENT:  Noncontributory.

NECK:  No history of thyroid disease.

CHEST:  No bronchitis, hay fever, or asthma.  The patient does not smoke.  

CARDIOVASCULAR:  Lexiscan a few months back showed no evidence of a perfusion defect.  History of hy
pertrophic obstructive cardiomyopathy with paroxysmal atrial fibrillation.  Last  November had acute
 MI with AFib and he underwent PTCA of the proximal LAD with a drug-eluting stent.

GASTROINTESTINAL:  No constipation, diarrhea, change in bowel habits.  History of GERD and esophagit
is, improved with Protonix and Carafate.

GENITOURINARY:  No dysuria, hematuria, kidney stones.

 

ALLERGIES:  NO KNOWN ALLERGIES.

 

MEDICATIONS:  Include:

1.  Carafate 1 g q.i.d.

2.  Simethicone as needed.

3.  Lopressor 100 mg b.i.d.

4.  Pantoprazole 40 mg daily.

5.  Eliquis 5 mg b.i.d.

6.  Simvastatin 40 mg p.o. daily.

 

PHYSICAL EXAMINATION:

GENERAL:  The patient is an average-built male who is presently in no acute distress.

VITAL SIGNS:  Heart rate goes up to 141, irregularly irregular, O2 saturation 95% on 2 liters, blood
 pressure 172/117.  

NECK:  JVD not increased.

CHEST:  Clinically clear.

HEART:  S1, S2 heard with no definite gallops.

ABDOMEN:  Soft, obese, nontender.  No hepatosplenomegaly.

EXTREMITIES:  No edema.  Pedals 1+ bilaterally.  Homans sign is negative.

NEUROLOGIC:  No localizing or lateralizing signs.

 

LABORATORY DATA:  Initial WBC count 3.7, hematocrit 14.9, platelet count 197.  Sodium 146, potassium
 3.5.  Troponin 0.01.  BNP 1900.  Chest x-ray shows mild cardiomegaly, lung fields clear.

 

IMPRESSION:

1.  Chest pain, atypical, doubt acute myocardial infarction.

2.  Atrial fibrillation with rapid ventricular response, paroxysmal, non-valvular.

3.  Hypertrophic obstructive cardiomyopathy.

4.  Hypertension, accelerated.

5.  Hyperlipidemia.

6.  Status post cerebrovascular accident.

 

PLAN:  We will continue Eliquis and aspirin.  Continue beta blockers and calcium channel blocker.  C
ardiology consultation has been requested with Dr. Lemus and we will follow recommendations.  Con
tinue GI meds including Protonix and sucralfate.

 

 

Dictated By: ASAD VAUGHN MD

 

SR/NTS

DD:    06/02/2017 09:18:40

DT:    06/02/2017 10:26:50

Conf#: [id number]

DID#:  [id number]

## 2017-06-02 NOTE — CONS
Date/Time of Note


Date/Time of Note


DATE: 6/2/17 


TIME: 16:04





Assessment/Plan


Assessment/Plan


Additional Assessment/Plan


76-year-old man with a history of HOCM, increased LVOT gradient, paroxysmal 

atrial fibrillation,CVA, CAD s/p PCI to RCA 2010, LAD 11/2016 who presented w/ 

afib w/ RVR and chest pain.  HR under control with metoprolol and diltiazem.  

Pt is asymptomatic.  Ok to d/c home.  


- would change metoprolol tartrate to succinate 100mg bid for better coverage


- continue dilt 120mg CD daily (pt did not have a reaction to this,d/w Dr. Hunter)


- continue other meds


- f/u w/ Dr. Hunter





Consultation Date/Type/Reason


Admit Date/Time


Jun 1, 2017 at 18:12


Initial Consult Date


6/1/17


Type of Consultation:  Cardiology


Reason for Consultation


afib w/ RVR


Referring Provider:  ASAD VAUGHN MD





24 HR Interval Summary


Free Text/Dictation


Feels well.  Denies cp, sob or palpitations.





Exam/Review of Systems


Vital Signs


Vitals





 Vital Signs








  Date Time  Temp Pulse Resp B/P Pulse Ox O2 Delivery O2 Flow Rate FiO2


 


6/2/17 15:20 98.0 77 18 111/71 96   


 


6/2/17 08:01       2.0 


 


6/1/17 21:01      Nasal Cannula  














 Intake and Output   


 


 6/1/17 6/1/17 6/2/17





 15:00 23:00 07:00


 


Intake Total   120 ml


 


Balance   120 ml











Exam


Constitutional:  alert


Neck:  No jvd


Respiratory:  clear to auscultation


Cardiovascular:  irregular rhythm, other (2/6 SKYLAR LLSB)


Extremities:  other, 


   No edema





Results


Result Diagram:  


6/2/17 0625                                                                    

            6/2/17 0525





Results 24 hrs





Laboratory Tests








Test


  6/1/17


16:55 6/1/17


17:15 6/2/17


05:25 6/2/17


06:25


 


White Blood Count 3.7  L   3.3  L


 


Red Blood Count 5.43     5.12  


 


Hemoglobin 16.5     15.2  


 


Hematocrit 49.9     47.1  


 


Mean Corpuscular Volume 91.9     92.0  


 


Mean Corpuscular Hemoglobin 30.4     29.7  


 


Mean Corpuscular Hemoglobin


Concent 33.1  


  


  


  32.3  


 


 


Red Cell Distribution Width 15.2  H   15.1  H


 


Platelet Count 183  #   127  #L


 


Mean Platelet Volume 11.7  H   11.1  H


 


Neutrophils % 70.7     79.4  H


 


Lymphocytes % 15.9     10.0  L


 


Monocytes % 11.5  H   9.7  


 


Eosinophils % 0.3     0.0  


 


Basophils % 1.1     0.6  


 


Nucleated Red Blood Cells % 0.0     0.0  


 


Neutrophils # 2.6     2.6  


 


Lymphocytes # 0.6  L   0.3  L


 


Monocytes # 0.4     0.3  


 


Eosinophils # 0.0     0.0  


 


Basophils # 0.0     0.0  


 


Nucleated Red Blood Cells # 0.0     0.0  


 


Prothrombin Time  15.5  H  


 


Prothrombin Time Ratio  1.2    


 


INR International Normalized


Ratio 


  1.22  


  


  


 


 


Activated Partial


Thromboplast Time 


  30.0  


  


  


 


 


Sodium Level  146  H 143   


 


Potassium Level  3.5   3.7   


 


Chloride Level  114  H 105   


 


Carbon Dioxide Level  24   27   


 


Anion Gap  12   15   


 


Blood Urea Nitrogen  10   13   


 


Creatinine  0.63   0.56  L 


 


Glucose Level  107   110   


 


Calcium Level  9.0   8.9   


 


Troponin I  < 0.012   < 0.012   


 


B-Type Natriuretic Peptide  1900  H  


 


Magnesium Level   1.9   


 


Creatine Kinase   30   


 


Creatine Kinase Index   4.4   


 


Creatinine Kinase MB (Mass)   1.33   











Medications


Medications





 Current Medications


Metoprolol Tartrate (Lopressor) 100 mg BID PO  Last administered on 6/2/17at 09:

00; Admin Dose 100 MG;  Start 6/1/17 at 22:00


Sucralfate (Carafate) 1 gm QID PO  Last administered on 6/2/17at 13:19; Admin 

Dose 1 GM;  Start 6/2/17 at 09:00


Atorvastatin Calcium (Lipitor) 20 mg QHS PO ;  Start 6/2/17 at 21:00


Apixaban (Eliquis) 5 mg BID PO  Last administered on 6/2/17at 09:00; Admin Dose 

5 MG;  Start 6/2/17 at 09:00


Pantoprazole (Protonix Tab) 40 mg DAILY@06 PO  Last administered on 6/2/17at 06:

01; Admin Dose 40 MG;  Start 6/2/17 at 06:00


Zolpidem Tartrate (Ambien) 5 mg HS  PRN PO INSOMNIA;  Start 6/1/17 at 21:30


Ondansetron HCl (Zofran Inj) 4 mg Q4H  PRN IV NAUSEA AND/OR VOMITING Last 

administered on 6/1/17at 21:54; Admin Dose 4 MG;  Start 6/1/17 at 21:30


Nitroglycerin (Nitroglycerin (Sl Tab) 0.4 Mg) 1 tab Q5M  PRN SL ANGINA;  Start 6 /1/17 at 21:30


Morphine Sulfate (morphine) 4 mg Q4H  PRN IV SEVERE PAIN LEVEL 7-10 Last 

administered on 6/1/17at 21:54; Admin Dose 4 MG;  Start 6/1/17 at 22:00


Aspirin (Halfprin) 81 mg DAILY PO  Last administered on 6/2/17at 08:59; Admin 

Dose 81 MG;  Start 6/2/17 at 09:00


Diltiazem HCl (Cardizem Cd) 120 mg DAILY PO  Last administered on 6/2/17at 09:00

; Admin Dose 120 MG;  Start 6/2/17 at 09:00


Lorazepam (Ativan) 0.5 mg Q4H  PRN PO ANXIETY Last administered on 6/2/17at 09:

07; Admin Dose 0.5 MG;  Start 6/2/17 at 00:30


Clonidine (Catapres) 0.1 mg Q4H  PRN PO SBP > 170 or DBP >110 Last administered 

on 6/2/17at 08:59; Admin Dose 0.1 MG;  Start 6/2/17 at 00:30











NALDO MARTIN Jun 2, 2017 16:08

## 2017-06-03 VITALS — DIASTOLIC BLOOD PRESSURE: 86 MMHG | SYSTOLIC BLOOD PRESSURE: 109 MMHG | RESPIRATION RATE: 16 BRPM

## 2017-06-03 VITALS — HEART RATE: 81 BPM

## 2017-06-03 VITALS — HEART RATE: 92 BPM

## 2017-06-03 VITALS — SYSTOLIC BLOOD PRESSURE: 144 MMHG | RESPIRATION RATE: 18 BRPM | DIASTOLIC BLOOD PRESSURE: 86 MMHG

## 2017-06-03 VITALS — DIASTOLIC BLOOD PRESSURE: 80 MMHG | SYSTOLIC BLOOD PRESSURE: 127 MMHG | RESPIRATION RATE: 16 BRPM

## 2017-06-03 VITALS — DIASTOLIC BLOOD PRESSURE: 81 MMHG | RESPIRATION RATE: 18 BRPM | SYSTOLIC BLOOD PRESSURE: 136 MMHG

## 2017-06-03 VITALS — HEART RATE: 79 BPM

## 2017-06-03 VITALS — HEART RATE: 82 BPM

## 2017-06-03 LAB
ABNORMAL IP MESSAGE: 1
ADD SCAN DIFF: NO
ANION GAP SERPL CALC-SCNC: 12 MMOL/L (ref 8–16)
BASOPHILS # BLD AUTO: 0 10^3/UL (ref 0–0.1)
BASOPHILS NFR BLD: 0.5 % (ref 0–2)
BUN SERPL-MCNC: 18 MG/DL (ref 7–20)
CALCIUM SERPL-MCNC: 8.9 MG/DL (ref 8.4–10.2)
CHLORIDE SERPL-SCNC: 103 MMOL/L (ref 97–110)
CO2 SERPL-SCNC: 31 MMOL/L (ref 21–31)
CREAT SERPL-MCNC: 0.76 MG/DL (ref 0.61–1.24)
EOSINOPHIL # BLD: 0 10^3/UL (ref 0–0.5)
EOSINOPHIL NFR BLD: 0.5 % (ref 0–7)
ERYTHROCYTE [DISTWIDTH] IN BLOOD BY AUTOMATED COUNT: 14.9 % (ref 11.5–14.5)
GLUCOSE SERPL-MCNC: 91 MG/DL (ref 70–220)
HCT VFR BLD CALC: 46 % (ref 42–52)
HGB BLD-MCNC: 14.9 G/DL (ref 14–18)
LYMPHOCYTES # BLD AUTO: 0.4 10^3/UL (ref 0.8–2.9)
LYMPHOCYTES NFR BLD AUTO: 9.5 % (ref 15–51)
MAGNESIUM SERPL-MCNC: 2 MG/DL (ref 1.7–2.5)
MCH RBC QN AUTO: 29.7 PG (ref 29–33)
MCHC RBC AUTO-ENTMCNC: 32.4 G/DL (ref 32–37)
MCV RBC AUTO: 91.6 FL (ref 82–101)
MONOCYTES # BLD: 0.3 10^3/UL (ref 0.3–0.9)
MONOCYTES NFR BLD: 8.9 % (ref 0–11)
NEUTROPHILS # BLD: 3 10^3/UL (ref 1.6–7.5)
NEUTROPHILS NFR BLD AUTO: 80.3 % (ref 39–77)
NRBC # BLD MANUAL: 0 10^3/UL (ref 0–0)
NRBC BLD QL: 0 /100WBC (ref 0–0)
PLATELET # BLD: 111 10^3/UL (ref 140–415)
PMV BLD AUTO: 11.4 FL (ref 7.4–10.4)
POTASSIUM SERPL-SCNC: 4 MMOL/L (ref 3.5–5.1)
RBC # BLD AUTO: 5.02 10^6/UL (ref 4.7–6.1)
SODIUM SERPL-SCNC: 142 MMOL/L (ref 135–144)
WBC # BLD AUTO: 3.7 10^3/UL (ref 4.8–10.8)

## 2017-06-03 RX ADMIN — APIXABAN SCH MG: 5 TABLET, FILM COATED ORAL at 08:31

## 2017-06-03 RX ADMIN — DILTIAZEM HYDROCHLORIDE SCH MG: 120 CAPSULE, EXTENDED RELEASE ORAL at 08:31

## 2017-06-03 RX ADMIN — METOPROLOL TARTRATE SCH MG: 100 TABLET, FILM COATED ORAL at 08:32

## 2017-06-03 RX ADMIN — PANTOPRAZOLE SODIUM SCH MG: 40 TABLET, DELAYED RELEASE ORAL at 05:04

## 2017-06-03 RX ADMIN — ASPIRIN SCH MG: 81 TABLET, COATED ORAL at 08:31

## 2017-06-03 RX ADMIN — SUCRALFATE SCH GM: 1 TABLET ORAL at 08:36

## 2017-06-03 NOTE — DS
DATE OF ADMISSION: 06/01/2017

DATE OF DISCHARGE: 06/03/2017

 

FINAL DIAGNOSES:

1.  Atrial fibrillation with rapid ventricular response, paroxysmal, nonvalvular.

2.  Atypical chest pain.  No evidence of acute myocardial infarction.

3.  Hypertrophic obstructive cardiomyopathy.

4.  Hypertension.

5.  Hyperlipidemia.

6.  Status post cerebrovascular accident, with no residual.

 

HOSPITAL COURSE:  The patient is a 76-year-old gentleman with a history of paroxysmal atrial fibrill
ation, status post cerebrovascular accident, presenting with 1 day of increased chest pain with palp
itations, with abdominal discomfort, and was evaluated in the emergency room and found to be in atri
al fibrillation with RVR, given IV diltiazem, with improvement, and was admitted to the telemetry 
it.  Serial EKG and enzymes are negative for acute MI.  The patient was seen by Dr. Pierre and subs
equently by  _____, who recommended that we switch from metoprolol tartrate 100 mg b.i.d. to succ
inate and the patient was discharged home on all his prior medications and switched from metoprolol 
tartrate 100 mg b.i.d. to succinate 100 mg p.o. b.i.d., and the patient will be followed in my offic
e over the course of next week.

 

DISCHARGE CONDITION:  Much improved.

 

 

Dictated By: SAAD VAUGHN MD, SR/NTS

DD:    06/03/2017 11:19:58

DT:    06/03/2017 11:40:02

Conf#: [id number]

DID#:  [id number]

## 2017-07-19 ENCOUNTER — HOSPITAL ENCOUNTER (INPATIENT)
Dept: HOSPITAL 10 - E/R | Age: 77
LOS: 3 days | Discharge: HOME | DRG: 392 | End: 2017-07-22
Attending: INTERNAL MEDICINE | Admitting: INTERNAL MEDICINE
Payer: MEDICARE

## 2017-07-19 VITALS — RESPIRATION RATE: 16 BRPM | HEART RATE: 93 BPM | SYSTOLIC BLOOD PRESSURE: 126 MMHG | DIASTOLIC BLOOD PRESSURE: 73 MMHG

## 2017-07-19 VITALS
BODY MASS INDEX: 29.35 KG/M2 | HEIGHT: 70 IN | WEIGHT: 205.03 LBS | BODY MASS INDEX: 29.35 KG/M2 | HEIGHT: 70 IN | WEIGHT: 205.03 LBS

## 2017-07-19 VITALS — DIASTOLIC BLOOD PRESSURE: 88 MMHG | RESPIRATION RATE: 20 BRPM | SYSTOLIC BLOOD PRESSURE: 133 MMHG | HEART RATE: 76 BPM

## 2017-07-19 VITALS — HEART RATE: 94 BPM | DIASTOLIC BLOOD PRESSURE: 85 MMHG | SYSTOLIC BLOOD PRESSURE: 134 MMHG

## 2017-07-19 VITALS — HEART RATE: 101 BPM

## 2017-07-19 VITALS — RESPIRATION RATE: 20 BRPM | DIASTOLIC BLOOD PRESSURE: 100 MMHG | HEART RATE: 107 BPM | SYSTOLIC BLOOD PRESSURE: 146 MMHG

## 2017-07-19 VITALS — HEART RATE: 88 BPM

## 2017-07-19 VITALS — HEART RATE: 94 BPM

## 2017-07-19 VITALS — HEART RATE: 87 BPM

## 2017-07-19 VITALS — SYSTOLIC BLOOD PRESSURE: 139 MMHG | RESPIRATION RATE: 20 BRPM | DIASTOLIC BLOOD PRESSURE: 99 MMHG

## 2017-07-19 DIAGNOSIS — Z87.891: ICD-10-CM

## 2017-07-19 DIAGNOSIS — Z86.73: ICD-10-CM

## 2017-07-19 DIAGNOSIS — I10: ICD-10-CM

## 2017-07-19 DIAGNOSIS — R10.13: ICD-10-CM

## 2017-07-19 DIAGNOSIS — Z95.5: ICD-10-CM

## 2017-07-19 DIAGNOSIS — K22.2: ICD-10-CM

## 2017-07-19 DIAGNOSIS — I42.2: ICD-10-CM

## 2017-07-19 DIAGNOSIS — K29.70: Primary | ICD-10-CM

## 2017-07-19 DIAGNOSIS — K20.0: ICD-10-CM

## 2017-07-19 DIAGNOSIS — I48.91: ICD-10-CM

## 2017-07-19 DIAGNOSIS — R07.89: ICD-10-CM

## 2017-07-19 DIAGNOSIS — K21.9: ICD-10-CM

## 2017-07-19 DIAGNOSIS — R14.2: ICD-10-CM

## 2017-07-19 LAB
ABNORMAL IP MESSAGE: 1
ADD SCAN DIFF: NO
ALBUMIN SERPL-MCNC: 4.3 G/DL (ref 3.3–4.9)
ALBUMIN/GLOB SERPL: 1.07 {RATIO}
ALP SERPL-CCNC: 98 IU/L (ref 42–121)
ALT SERPL-CCNC: 42 IU/L (ref 13–69)
ANION GAP SERPL CALC-SCNC: 21 MMOL/L (ref 8–16)
AST SERPL-CCNC: 56 IU/L (ref 15–46)
BASOPHILS # BLD AUTO: 0 10^3/UL (ref 0–0.1)
BASOPHILS NFR BLD: 1 % (ref 0–2)
BILIRUB DIRECT SERPL-MCNC: 0 MG/DL (ref 0–0.2)
BILIRUB SERPL-MCNC: 0.8 MG/DL (ref 0.2–1.3)
BUN SERPL-MCNC: 11 MG/DL (ref 7–20)
CALCIUM SERPL-MCNC: 9.1 MG/DL (ref 8.4–10.2)
CHLORIDE SERPL-SCNC: 102 MMOL/L (ref 97–110)
CO2 SERPL-SCNC: 27 MMOL/L (ref 21–31)
CREAT SERPL-MCNC: 0.66 MG/DL (ref 0.61–1.24)
EOSINOPHIL # BLD: 0 10^3/UL (ref 0–0.5)
EOSINOPHIL NFR BLD: 0.3 % (ref 0–7)
ERYTHROCYTE [DISTWIDTH] IN BLOOD BY AUTOMATED COUNT: 15.9 % (ref 11.5–14.5)
GLOBULIN SER-MCNC: 4 G/DL (ref 1.3–3.2)
GLUCOSE SERPL-MCNC: 103 MG/DL (ref 70–220)
HCT VFR BLD CALC: 46.9 % (ref 42–52)
HGB BLD-MCNC: 15.6 G/DL (ref 14–18)
LYMPHOCYTES # BLD AUTO: 0.3 10^3/UL (ref 0.8–2.9)
LYMPHOCYTES NFR BLD AUTO: 8 % (ref 15–51)
MCH RBC QN AUTO: 31 PG (ref 29–33)
MCHC RBC AUTO-ENTMCNC: 33.3 G/DL (ref 32–37)
MCV RBC AUTO: 93.2 FL (ref 82–101)
MONOCYTES # BLD: 0.4 10^3/UL (ref 0.3–0.9)
MONOCYTES NFR BLD: 12.8 % (ref 0–11)
NEUTROPHILS # BLD: 2.4 10^3/UL (ref 1.6–7.5)
NEUTROPHILS NFR BLD AUTO: 77.3 % (ref 39–77)
NRBC # BLD MANUAL: 0 10^3/UL (ref 0–0)
NRBC BLD QL: 0 /100WBC (ref 0–0)
PLATELET # BLD: 114 10^3/UL (ref 140–415)
PMV BLD AUTO: 10.5 FL (ref 7.4–10.4)
POTASSIUM SERPL-SCNC: 3.7 MMOL/L (ref 3.5–5.1)
PROT SERPL-MCNC: 8.3 G/DL (ref 6.1–8.1)
RBC # BLD AUTO: 5.03 10^6/UL (ref 4.7–6.1)
SODIUM SERPL-SCNC: 146 MMOL/L (ref 135–144)
TROPONIN I SERPL-MCNC: < 0.012 NG/ML (ref 0–0.12)
WBC # BLD AUTO: 3.1 10^3/UL (ref 4.8–10.8)

## 2017-07-19 PROCEDURE — 83735 ASSAY OF MAGNESIUM: CPT

## 2017-07-19 PROCEDURE — 36415 COLL VENOUS BLD VENIPUNCTURE: CPT

## 2017-07-19 PROCEDURE — 96374 THER/PROPH/DIAG INJ IV PUSH: CPT

## 2017-07-19 PROCEDURE — 96375 TX/PRO/DX INJ NEW DRUG ADDON: CPT

## 2017-07-19 PROCEDURE — 71010: CPT

## 2017-07-19 PROCEDURE — 80053 COMPREHEN METABOLIC PANEL: CPT

## 2017-07-19 PROCEDURE — 83690 ASSAY OF LIPASE: CPT

## 2017-07-19 PROCEDURE — 80061 LIPID PANEL: CPT

## 2017-07-19 PROCEDURE — 93306 TTE W/DOPPLER COMPLETE: CPT

## 2017-07-19 PROCEDURE — 85025 COMPLETE CBC W/AUTO DIFF WBC: CPT

## 2017-07-19 PROCEDURE — 84484 ASSAY OF TROPONIN QUANT: CPT

## 2017-07-19 PROCEDURE — 80048 BASIC METABOLIC PNL TOTAL CA: CPT

## 2017-07-19 PROCEDURE — 93005 ELECTROCARDIOGRAM TRACING: CPT

## 2017-07-19 PROCEDURE — 84443 ASSAY THYROID STIM HORMONE: CPT

## 2017-07-19 RX ADMIN — SUCRALFATE SCH GM: 1 TABLET ORAL at 18:26

## 2017-07-19 RX ADMIN — APIXABAN SCH MG: 5 TABLET, FILM COATED ORAL at 21:50

## 2017-07-19 RX ADMIN — SUCRALFATE SCH GM: 1 TABLET ORAL at 21:50

## 2017-07-19 RX ADMIN — ATORVASTATIN CALCIUM SCH MG: 40 TABLET, FILM COATED ORAL at 21:50

## 2017-07-19 NOTE — ERA
ER Documentation


Chief Complaint


Date/Time


DATE: 7/19/17 


TIME: 06:59


Chief Complaint


chest pain with sob since 0100 am





HPI


76-year-old man presents with pressure-like chest pain and epigastric discomfort

, belching, shortness of breath, palpitations, diaphoresis beginning early in 

the morning.  Son who is at the bedside states he has similar episodes about 

once per month.  Patient does have a strong history of coronary artery disease 

and is status post PCI and stent placement.  He uses aspirin and Eliquis daily.

  The chest discomfort has been nonexertional nonradiating, he has had no 

fevers or chills, no vomiting or diarrhea, no headache or blurry vision.  

Patient does not use nitroglycerin.





ROS


All systems reviewed and are negative except as per history of present illness.





Medications


Home Meds


Active Scripts


Aspirin* (Aspirin* EC) 81 Mg Tablet.dr, 81 MG PO DAILY for 30 Days, #30


   Prov:ASAD VAUGHN MD         6/3/17


Apixaban* (Eliquis*) 5 Mg Tablet, 5 MG PO BID for 30 Days, #60 TAB


   Prov:ASAD VAUGHN MD         11/20/16


Reported Medications


Sucralfate* (Carafate*) 1 Gm Tab, 2 GM PO BID, TAB


   6/1/17


Simvastatin* (Zocor*) 40 Mg Tablet, 40 MG PO QHS, #30 TAB


   4/5/17





Allergies


Allergies:  


Coded Allergies:  


     No Known Allergies (Verified  Allergy, Unknown, 6/1/17)





PMhx/Soc


Atrial fibrillation, recurrent atypical chest pain, hypertrophic obstructive 

cardiomyopathy, hypertension, hyperlipidemia, CVA


History of Surgery:  Yes (PCI 2010 AND 2016)


Anesthesia Reaction:  No


Hx Neurological Disorder:  No


Hx Respiratory Disorders:  Yes (SOB)


Hx Cardiac Disorders:  Yes (CAD, HTN, PCI, CARDIOMYOPATHY)


Hx Psychiatric Problems:  No


Hx Miscellaneous Medical Probl:  Yes (HYPERLIPIDEMIA)


Hx Alcohol Use:  No


Hx Substance Use:  No


Hx Tobacco Use:  No





FmHx


Family History:  No diabetes





Physical Exam


Vitals





Vital Signs








  Date Time  Temp Pulse Resp B/P Pulse Ox O2 Delivery O2 Flow Rate FiO2


 


7/19/17 08:00  76 22 142/92 99 Nasal Cannula 2.0 


 


7/19/17 07:10      Nasal Cannula 2 


 


7/19/17 06:46 97.7 98 18 132/95 99   








Physical Exam


GENERAL: Well-developed, well-nourished, well-hydrated, in no apparent distress

, looks nontoxic in appearance, afebrile


HEENT: Moist mucous membranes, pink conjunctiva, no cervical spine tenderness 

or step-off deformities, no goiter, no jaundice or icterus, extraocular 

movements intact without pain. No submandibular induration, and no pharyngeal 

erythema


NEURO: Alert and oriented 3, cranial nerves II through XII intact bilaterally, 

pupils equal round reactive to light, no focal deficits or facial asymmetry, 

sensation intact distally Strength 5/5 in upper and lower extremities 

bilaterally


CARDIAC: Irregular, no murmurs rubs or gallops


LUNGS: Clear bilaterally no wheezing crackles or stridor


ABDOMEN: Soft nontender, no guarding, no rigidity, no rebound, no psoas sign no 

obturator sign. Normoactive bowel sounds


SKIN: Warm and dry to touch, no abrasions, contusions, or hematomas, no 

lacerations, no ecchymosis, no target lesions, and without ulcers


EXTREMITIES: No clubbing cyanosis or edema, calves are bilaterally symmetrical, 

no Homans sign, no popliteal cord sign. Distal pulses equal and bilateral


PSYCH: Normal affect without agitation or irritability


Result Diagram:  


7/19/17 0720 7/19/17 0720





Results 24 hrs








 Laboratory Tests








Test


  7/19/17


07:20


 


White Blood Count 3.110^3/ul 


 


Red Blood Count 5.0310^6/ul 


 


Hemoglobin 15.6g/dl 


 


Hematocrit 46.9% 


 


Mean Corpuscular Volume 93.2fl 


 


Mean Corpuscular Hemoglobin 31.0pg 


 


Mean Corpuscular Hemoglobin


Concent 33.3g/dl 


 


 


Red Cell Distribution Width 15.9% 


 


Platelet Count 87433^3/UL 


 


Mean Platelet Volume 10.5fl 


 


Neutrophils % 77.3% 


 


Lymphocytes % 8.0% 


 


Monocytes % 12.8% 


 


Eosinophils % 0.3% 


 


Basophils % 1.0% 


 


Nucleated Red Blood Cells % 0.0/100WBC 


 


Neutrophils # 2.410^3/ul 


 


Lymphocytes # 0.310^3/ul 


 


Monocytes # 0.410^3/ul 


 


Eosinophils # 0.010^3/ul 


 


Basophils # 0.010^3/ul 


 


Nucleated Red Blood Cells # 0.010^3/ul 


 


Sodium Level 146mmol/L 


 


Potassium Level 3.7mmol/L 


 


Chloride Level 102mmol/L 


 


Carbon Dioxide Level 27mmol/L 


 


Anion Gap 21 


 


Blood Urea Nitrogen 11mg/dl 


 


Creatinine 0.66mg/dl 


 


Glucose Level 103mg/dl 


 


Calcium Level 9.1mg/dl 


 


Total Bilirubin 0.8mg/dl 


 


Direct Bilirubin 0.00mg/dl 


 


Indirect Bilirubin 0.8mg/dl 


 


Aspartate Amino Transf


(AST/SGOT) 56IU/L 


 


 


Alanine Aminotransferase


(ALT/SGPT) 42IU/L 


 


 


Alkaline Phosphatase 98IU/L 


 


Troponin I < 0.012ng/ml 


 


Total Protein 8.3g/dl 


 


Albumin 4.3g/dl 


 


Globulin 4.00g/dl 


 


Albumin/Globulin Ratio 1.07 


 


Lipase 149U/L 








 Current Medications








 Medications


  (Trade)  Dose


 Ordered  Sig/Graciela


 Route


 PRN Reason  Start Time


 Stop Time Status Last Admin


Dose Admin


 


 Aspirin


  (Aspirin)  324 mg  ONCE  ONCE


 PO


   7/19/17 07:30


 7/19/17 07:31 DC 7/19/17 07:36


 


 


 Ketorolac


 Tromethamine


  (Toradol)  15 mg  ONCE  STAT


 IV


   7/19/17 07:07


 7/19/17 07:22 DC 7/19/17 07:36


 


 


 Diltiazem HCl


  (Cardizem Iv)  20 mg  ONCE  ONCE


 IV


   7/19/17 07:30


 7/19/17 07:31 DC 7/19/17 07:36


 


 


 Ondansetron HCl 4


 mg  4 mg  ONCE  STAT


 IV


   7/19/17 07:07


 7/19/17 07:22 DC 7/19/17 07:31


 


 


 Sodium Chloride


  (NS)  500 ml @ 


 500 mls/hr  Q1H STAT


 IV


   7/19/17 07:07


 7/19/17 08:06 DC 7/19/17 07:37


 


 


 Miscellaneous


 Medication


  (Gi Cocktail (2))  40 ml  ONCE  STAT


 PO


   7/19/17 07:07


 7/19/17 07:22 DC 7/19/17 07:36


 


 


 Belladonna/


 Phenobarbital


  (Donnatal)  2 tab  ONCE  STAT


 PO


   7/19/17 07:07


 7/19/17 07:22 DC 7/19/17 07:36


 


 


 Potassium Chloride


  (Klor-Con 20)  60 meq  ONCE  STAT


 PO


   7/19/17 08:58


 7/19/17 09:00 DC  


 














Procedures/MDM


IV line was established patient was placed on cardiac monitor rhythm strip 

revealed an irregular rhythm at about 100 bpm.  Patient was afebrile.





EKG performed, read by me revealed an atrial fibrillation rate controlled at 87 

bpm, left axis deviation, and a right bundle branch block QRS duration 126 ms, 

no concerning ST elevations or depressions noted.





Chest X-ray 1V Interpreted by me:


Soft Tissue:                                               No acute 

abnormalities


Bones:                                                    No acute abnormalities


Mediastinum/Cardiac Silhouette/Lungs:     No acute abnormalities





I administered aspirin 162 mg p.o. for cardioprotective measures, nitroglycerin 

0.4 mg sublingual, diltiazem 20 mg IV, GI cocktail 50 cc p.o., Toradol 15 mg IV

, Zofran 4 mg IV, and normal saline 500 cc IV 1.





CBC and electrolytes are normal, liver function tests were normal, troponin was 

negative.





Patient symptoms have improved although he has strong cardiac risk factors and 

will be admitted to telemetry setting for cardiology and GI consultations.





Departure


Diagnosis:  


 Primary Impression:  


 Hypertension


 Qualified Code:  I10 - Essential hypertension


 Additional Impressions:  


 Chest pain


 Qualified Code:  R07.9 - Chest pain, unspecified type


 Unstable angina


 Atrial fibrillation


 Qualified Code:  I48.91 - Atrial fibrillation, unspecified type


 GERD (gastroesophageal reflux disease)


 Qualified Code:  K21.0 - Gastroesophageal reflux disease with esophagitis


Condition:  SELAM Miller MD Jul 19, 2017 07:09

## 2017-07-19 NOTE — RADRPT
PROCEDURE:   XR Chest 1 View. 

 

CLINICAL INDICATION:   Abnormal breath sounds, abdominal pain 

 

TECHNIQUE:   AP view of the chest was obtained. 

 

COMPARISON:   April 5, 2017 and CT November 15, 2016

 

FINDINGS:

The heart size is within normal limits.  Calcified atherosclerosis is noted in the aorta. Calcified 
pleural plaques over both sides of the mid chest are unchanged. No consolidations are identified.  N
o pneumothorax is seen.  Osseous structures are intact. 

 

IMPRESSION:

Calcified atherosclerosis in the aorta. 

 

Stable calcified pleural plaques over both sides of the mid lungs.  Query prior asbestos exposure.

 

 

RPTAT: AA

_____________________________________________ 

.Boubacar Petersen MD, MD           Date    Time 

Electronically viewed and signed by .Boubacar Petersen MD, MD on 07/19/2017 07:59 

 

D:  07/19/2017 07:59  T:  07/19/2017 07:59

.P/

## 2017-07-20 VITALS — SYSTOLIC BLOOD PRESSURE: 143 MMHG | RESPIRATION RATE: 20 BRPM | DIASTOLIC BLOOD PRESSURE: 87 MMHG

## 2017-07-20 VITALS — SYSTOLIC BLOOD PRESSURE: 136 MMHG | DIASTOLIC BLOOD PRESSURE: 94 MMHG | RESPIRATION RATE: 20 BRPM

## 2017-07-20 VITALS — HEART RATE: 94 BPM

## 2017-07-20 VITALS — RESPIRATION RATE: 21 BRPM | SYSTOLIC BLOOD PRESSURE: 103 MMHG | DIASTOLIC BLOOD PRESSURE: 80 MMHG

## 2017-07-20 VITALS — SYSTOLIC BLOOD PRESSURE: 144 MMHG | RESPIRATION RATE: 19 BRPM | DIASTOLIC BLOOD PRESSURE: 99 MMHG

## 2017-07-20 VITALS — HEART RATE: 105 BPM

## 2017-07-20 VITALS — HEART RATE: 133 BPM

## 2017-07-20 VITALS — HEART RATE: 98 BPM

## 2017-07-20 VITALS — SYSTOLIC BLOOD PRESSURE: 129 MMHG | DIASTOLIC BLOOD PRESSURE: 82 MMHG | RESPIRATION RATE: 18 BRPM

## 2017-07-20 VITALS — RESPIRATION RATE: 16 BRPM | SYSTOLIC BLOOD PRESSURE: 133 MMHG | DIASTOLIC BLOOD PRESSURE: 95 MMHG

## 2017-07-20 VITALS — HEART RATE: 101 BPM

## 2017-07-20 VITALS — HEART RATE: 88 BPM

## 2017-07-20 VITALS — HEART RATE: 153 BPM

## 2017-07-20 LAB
ABNORMAL IP MESSAGE: 1
ADD SCAN DIFF: NO
ALBUMIN SERPL-MCNC: 3.6 G/DL (ref 3.3–4.9)
ALBUMIN/GLOB SERPL: 1.05 {RATIO}
ALP SERPL-CCNC: 73 IU/L (ref 42–121)
ALT SERPL-CCNC: 32 IU/L (ref 13–69)
ANION GAP SERPL CALC-SCNC: 16 MMOL/L (ref 8–16)
AST SERPL-CCNC: 47 IU/L (ref 15–46)
BASOPHILS # BLD AUTO: 0 10^3/UL (ref 0–0.1)
BASOPHILS NFR BLD: 0.9 % (ref 0–2)
BILIRUB DIRECT SERPL-MCNC: 0 MG/DL (ref 0–0.2)
BILIRUB SERPL-MCNC: 1.3 MG/DL (ref 0.2–1.3)
BUN SERPL-MCNC: 13 MG/DL (ref 7–20)
CALCIUM SERPL-MCNC: 9.5 MG/DL (ref 8.4–10.2)
CHLORIDE SERPL-SCNC: 101 MMOL/L (ref 97–110)
CO2 SERPL-SCNC: 32 MMOL/L (ref 21–31)
CREAT SERPL-MCNC: 0.76 MG/DL (ref 0.61–1.24)
EOSINOPHIL # BLD: 0 10^3/UL (ref 0–0.5)
EOSINOPHIL NFR BLD: 0.3 % (ref 0–7)
ERYTHROBLAST% (NRBC) (M): 0 /100WBC (ref 0–0)
ERYTHROCYTE [DISTWIDTH] IN BLOOD BY AUTOMATED COUNT: 15.9 % (ref 11.5–14.5)
GLOBULIN SER-MCNC: 3.4 G/DL (ref 1.3–3.2)
GLUCOSE SERPL-MCNC: 101 MG/DL (ref 70–220)
HCT VFR BLD CALC: 48.3 % (ref 42–52)
HGB BLD-MCNC: 16 G/DL (ref 14–18)
LYMPHOCYTES # BLD AUTO: 0.4 10^3/UL (ref 0.8–2.9)
LYMPHOCYTES NFR BLD AUTO: 11.2 % (ref 15–51)
MCH RBC QN AUTO: 31 PG (ref 29–33)
MCHC RBC AUTO-ENTMCNC: 33.1 G/DL (ref 32–37)
MCV RBC AUTO: 93.6 FL (ref 82–101)
MONOCYTES # BLD: 0.4 10^3/UL (ref 0.3–0.9)
MONOCYTES NFR BLD: 12.8 % (ref 0–11)
NEUTROPHILS # BLD: 2.4 10^3/UL (ref 1.6–7.5)
NEUTROPHILS NFR BLD AUTO: 74.2 % (ref 39–77)
NRBC # BLD MANUAL: 0 10^3/UL (ref 0–0)
PLATELET # BLD: 106 10^3/UL (ref 140–415)
PMV BLD AUTO: 11.2 FL (ref 7.4–10.4)
POTASSIUM SERPL-SCNC: 4.7 MMOL/L (ref 3.5–5.1)
PROT SERPL-MCNC: 7 G/DL (ref 6.1–8.1)
RBC # BLD AUTO: 5.16 10^6/UL (ref 4.7–6.1)
SODIUM SERPL-SCNC: 144 MMOL/L (ref 135–144)
WBC # BLD AUTO: 3.3 10^3/UL (ref 4.8–10.8)

## 2017-07-20 RX ADMIN — METOPROLOL SUCCINATE SCH MG: 100 TABLET, FILM COATED, EXTENDED RELEASE ORAL at 10:30

## 2017-07-20 RX ADMIN — SUCRALFATE SCH GM: 1 TABLET ORAL at 17:13

## 2017-07-20 RX ADMIN — AMIODARONE HYDROCHLORIDE SCH MG: 200 TABLET ORAL at 21:32

## 2017-07-20 RX ADMIN — SUCRALFATE SCH GM: 1 TABLET ORAL at 21:31

## 2017-07-20 RX ADMIN — APIXABAN SCH MG: 5 TABLET, FILM COATED ORAL at 08:56

## 2017-07-20 RX ADMIN — SUCRALFATE SCH GM: 1 TABLET ORAL at 08:56

## 2017-07-20 RX ADMIN — SUCRALFATE SCH GM: 1 TABLET ORAL at 12:44

## 2017-07-20 RX ADMIN — AMIODARONE HYDROCHLORIDE SCH MG: 200 TABLET ORAL at 08:57

## 2017-07-20 RX ADMIN — ATORVASTATIN CALCIUM SCH MG: 40 TABLET, FILM COATED ORAL at 21:31

## 2017-07-20 RX ADMIN — METOPROLOL SUCCINATE SCH MG: 100 TABLET, FILM COATED, EXTENDED RELEASE ORAL at 21:32

## 2017-07-20 RX ADMIN — APIXABAN SCH MG: 5 TABLET, FILM COATED ORAL at 21:31

## 2017-07-20 RX ADMIN — ASPIRIN SCH MG: 81 TABLET, COATED ORAL at 08:57

## 2017-07-20 NOTE — CONS
Date/Time of Note


Date/Time of Note


DATE: 7/20/17 


TIME: 08:37


INPATIENT CONSULTATION 





REQUESTING PHYSICIAN: Dr. Mata





REASON FOR CONSULT:   Atrial fibrillation, chest pain





HISTORY OF PRESENT ILLNESS:   


1.  Hypertrophic cardiomyopathy.


2.  Coronary artery disease prior right coronary artery stenting in 2010 with 

drug-eluting stent, recent proximal LAD drug-eluting stent November 2016.


3.  Paroxysmal atrial fibrillation.


4.  Hypertension.


5.  Hyperlipidemia.


6.  Prior CVA without residual.


7.  Repeated abdominal pain on medical therapy.


Patient admitted with episode of pain waking him up at night in his epigastric 

area felt like bloating but was not relieved with burping.  Denied any rapid 

beats any dyspnea diaphoresis or radiation or exertional pleuritic component to 

the pain.  The pain lasted for many hours came to the emergency room was given 

medications with resolution and is not returned.  Patient has been admitted in 

April and in June with atypical abdominal pains felt to be GI in etiology but 

endoscopy has not been done as of yet.





RISK FACTORS age, coronary disease, hypertension, hyperlipidemia, former smoker

, there is no gout diabetes or family history of early heart disease.





PAST MEDICAL HISTORY:  


1.  Coronary artery disease as above.


2.  Paroxysmal atrial fibrillation.


3.  Hypertrophic cardio myopathy.


4.  Hypertension.


5.  Hyperlipidemia.


6.  Prior CVA without residual.


7.  Multiple episodes of abdominal pain on medical therapy endoscopy to be 

scheduled.





PAST SURGICAL HISTORY:


No real surgical history except for prior coronary stenting.





MEDICATIONS:   Patient was on metoprolol tartrate 100 twice daily but at home 

was on succinate, baby aspirin, Eliquis 5 twice daily, Lipitor 40 mg a day, and 

Cardizem 120 a day.  Also was on Protonix and Carafate.





ALLERGIES:   No known drug allergies possibly Plavix.





SOCIAL HISTORY:   Patient  denies any smoking alcohol or drug use at 

present.





FAMILY HISTORY:   Pertinent for no premature coronary disease.





REVIEW OF SYSTEMS:   





Patient denied any fevers, chills, weight loss, nausea, vomiting, diarrhea, 

constipation, cough, hemoptysis, dysuria, hematuria, nocturia, any neurologic 

symptoms, headache, dyspnea, PND, orthopnea, leg edema, or palpitations.  Did 

complain of increasing belching abdominal pain and abdominal fullness at times.

  All other review of systems were normal.





PHYSICAL EXAMINATION:   


Vital signs please see chart.





HEENT; no JVD, no HJR, carotids 2 over 4+ without bruits.


Chest: Clear to auscultation and percussion, no rales, wheezes or rhonchi.


Cardiac: S1, S2 with normal physiologic splitting, 2/6 systolic ejection murmur 

at the base of the heart increase of Valsalva and sitting up maneuvers, no rub 

click or diastolic murmur noted.


Abdominal: Bowel sounds positive, soft nontender, no abdominal bruit noted, no 

hepatosplenomegaly.


Extremities: No cyanosis, clubbing, or edema. Negative Homans sign or palpable 

cords.


Pulses: 2/4 pulses diffusely no bruits noted.





ADDITIONAL DATA:   


CBC abnormal with white count low at 3.3 platelets 106,000 normal hemoglobin 

hematocrit, electrolytes BUN and creatinine normal, liver tests normal, 

troponin 2 negative.  EKG on admission on the 19th revealed atrial 

fibrillation rate of 90 poor R-wave progression nonspecific ST abnormality no 

acute change.


Chest x-ray revealed borderline cardiomegaly no wide mediastinum no heart 

failure no active pulmonary disease.


Telemetry currently revealing rapid atrial fibrillation rates of 110-120 beats 

a minute.





ASSESSMENT: 


1.  Atypical abdominal and chest pains possibly GI.


2.  Known coronary artery disease prior drug-eluting stents in the right 

coronary artery in 2010, LAD November 2016.


3.  Hypertrophic cardiomyopathy with significant outflow tract gradient.


4.  Hypertension.


5.  Hyperlipidemia.


6.  Paroxysmal atrial fibrillation.   


At this time the patient's symptoms similar to prior admissions consistent with 

GI in etiology.  However continues to have paroxysms of atrial fibrillation and 

at this point discussed that he has failed beta-blocker and Cardizem and would 

consider amiodarone.  Risks of starting amiodarone therapy including liver, skin

, lung, ocular, thyroid, not limited to proceeding explained and patient agrees 

and consents.  We will start amiodarone loading at 400 twice daily continue 

metoprolol may need to readjust downwards.  Will repeat echocardiogram and 

follow EKGs.  GI consultation pending.  Above discussed with Dr. Mata.  

Aspirin 81 mg restarted and continue Eliquis and patient with chads score of 4 

and chads VASC score of 6.





PLAN:  


1.  Start amiodarone loading 400 mg twice daily continue metoprolol 100 twice 

daily we will try to change to succinate preparation which patient is on at 

home.


2.  Check echocardiogram.


3.  Continue GI medications.


4.  Restart aspirin.











MARNIE NAJERA MD Jul 20, 2017 08:55

## 2017-07-20 NOTE — CONS
Date/Time of Note


Date/Time of Note


DATE: 7/20/17 


TIME: 18:46





Assessment/Plan


Assessment/Plan


Additional Assessment/Plan


1.  Abdominal pain and retrosternal discomfort atypical


2.  Atrial fibrillation


3.  Coronary artery disease status post stenting in November


4.  Hypertension


5.  Hypertrophic cardiomyopathy


6.  CVA without any residual effect.


Plan


We will proceed with EGD to find out the cause of his abdominal pain in the 

epigastric area radiating to the retrosternal.


Continue PPI





Consultation Date/Type/Reason


Admit Date/Time


Jul 19, 2017 at 10:42





Hx of Present Illness


76-year-old gentleman with a history of atrial fibrillation, hypertrophic 

cardiomyopathy, coronary artery disease status post stenting lost a stent was 

placed in November 2016, hypertension, hyperlipidemia, admitted with the 

complaints of epigastric pain which woke him up in the middle of the night.  

Patient had 2 such episode in the last few months.  No nausea no vomiting no 

heartburn he has been taking PPIs no relief.  He sonogram in the past had been 

negative the last ultrasound of the abdomen was 3 months ago.  Patient also 

stated that he had ultrasound done today I do not have any official report but 

as per the patient it was negative.  No GI bleeding no chest pain or shortness 

of breath.  He was evaluated by the cardiologist.  Patient had declined 

endoscopy in the past.





Past Medical History


Medical History:  coronary artery disease, high cholesterol, hypertension





Past Surgical History


Past Surgical Hx:  angioplasty, other





Family History


Significant Family History:  no pertinent family hx





Social History


Alcohol Use:  none


Smoking Status:  Former smoker





Exam/Review of Systems


Vital Signs


Vitals





 Vital Signs








  Date Time  Temp Pulse Resp B/P Pulse Ox O2 Delivery O2 Flow Rate FiO2


 


7/20/17 16:03  105      


 


7/20/17 15:47 98.1  18 129/82 96   


 


7/19/17 19:51      Room Air  


 


7/19/17 13:10       2.0 














 Intake and Output   


 


 7/19/17 7/19/17 7/20/17





 15:00 23:00 07:00


 


Intake Total   480 ml


 


Balance   480 ml











Exam


Constitutional:  alert, oriented, well developed


Psych:  nl mood/affect, no complaints


Head:  atraumatic, normocephalic


Eyes:  EOMI, PERRL, nl conjunctiva, nl lids, nl sclera


ENMT:  nl external ears & nose, nl lips & teeth, nl nasal mucosa & septum


Neck:  non-tender, supple


Respiratory:  clear to auscultation, normal air movement


Cardiovascular:  nl pulses, regular rate and rhythm


Gastrointestinal:  nl liver, spleen, non-tender, soft


Musculoskeletal:  nl extremities to inspection, nl gait and stance


Extremities:  normal pulses


Neurological:  CNS II-XII intact, nl mental status, nl speech, nl strength


Skin:  nl turgor, 


   No rash or lesions


Lymph:  nl lymph nodes





Results


Result Diagram:  


7/20/17 0624 7/20/17 0624





Results 24 hrs





Laboratory Tests








Test


  7/20/17


06:24


 


White Blood Count 3.3  L


 


Red Blood Count 5.16  


 


Hemoglobin 16.0  


 


Hematocrit 48.3  


 


Mean Corpuscular Volume 93.6  


 


Mean Corpuscular Hemoglobin 31.0  


 


Mean Corpuscular Hemoglobin


Concent 33.1  


 


 


Red Cell Distribution Width 15.9  H


 


Platelet Count 106  L


 


Mean Platelet Volume 11.2  H


 


Neutrophils % 74.2  


 


Lymphocytes % 11.2  L


 


Monocytes % 12.8  H


 


Eosinophils % 0.3  


 


Basophils % 0.9  


 


Nucleated Red Blood Cells % 0.0  


 


Neutrophils # 2.4  


 


Lymphocytes # 0.4  L


 


Monocytes # 0.4  


 


Eosinophils # 0.0  


 


Basophils # 0.0  


 


Nucleated Red Blood Cells # 0.0  


 


Sodium Level 144  


 


Potassium Level 4.7  


 


Chloride Level 101  


 


Carbon Dioxide Level 32  H


 


Anion Gap 16  


 


Blood Urea Nitrogen 13  


 


Creatinine 0.76  


 


Glucose Level 101  


 


Calcium Level 9.5  


 


Total Bilirubin 1.3  


 


Direct Bilirubin 0.00  


 


Indirect Bilirubin 1.3  H


 


Aspartate Amino Transf


(AST/SGOT) 47  H


 


 


Alanine Aminotransferase


(ALT/SGPT) 32  


 


 


Alkaline Phosphatase 73  


 


Troponin I 0.017  


 


Total Protein 7.0  #


 


Albumin 3.6  


 


Globulin 3.40  H


 


Albumin/Globulin Ratio 1.05  











Medications


Medications





 Current Medications


Atorvastatin Calcium (Lipitor) 40 mg HS PO  Last administered on 7/19/17at 21:50

; Admin Dose 40 MG;  Start 7/19/17 at 21:00


Sucralfate (Carafate) 1 gm QID PO  Last administered on 7/20/17at 17:13; Admin 

Dose 1 GM;  Start 7/19/17 at 17:00


Apixaban (Eliquis) 5 mg BID PO  Last administered on 7/20/17at 08:56; Admin 

Dose 5 MG;  Start 7/19/17 at 21:00


Lorazepam (Ativan) 0.5 mg Q4  PRN PO AGITATION;  Start 7/19/17 at 14:00


Simethicone (Mylicon) 80 mg QID  PRN GTB DISTENSION/GAS/BLOATING;  Start 7/20/ 17 at 08:30


Aspirin (Halfprin) 81 mg DAILY PO  Last administered on 7/20/17at 08:57; Admin 

Dose 81 MG;  Start 7/20/17 at 09:00


Amiodarone HCl (Cordarone) 400 mg BID PO  Last administered on 7/20/17at 08:57; 

Admin Dose 400 MG;  Start 7/20/17 at 09:00


Metoprolol Succinate (Toprol Xl) 100 mg BID PO  Last administered on 7/20/17at 

10:30; Admin Dose 100 MG;  Start 7/20/17 at 09:30











KRISTINE LEVIN MD Jul 20, 2017 18:50

## 2017-07-20 NOTE — RADRPT
Vent Rate: 115 bpm

RR Interval: 0 msec

VT Interval: 0 msec

QRS Duration: 118 msec

QT Interval: 362 msec

QTC Interval: 500 msec

P-R-T Axis: 0 - -24 - 122 degrees

 

 Atrial fibrillation with rapid ventricular response

 Left ventricular hypertrophy with QRS widening

 ST  amp; T wave abnormality, consider lateral ischemia or digitalis 

effect

 Abnormal ECG

 

Electronically Signed By: Robert Sampson

[id number]

## 2017-07-21 VITALS — HEART RATE: 118 BPM | SYSTOLIC BLOOD PRESSURE: 115 MMHG | RESPIRATION RATE: 18 BRPM | DIASTOLIC BLOOD PRESSURE: 79 MMHG

## 2017-07-21 VITALS — HEART RATE: 108 BPM | RESPIRATION RATE: 17 BRPM | SYSTOLIC BLOOD PRESSURE: 115 MMHG | DIASTOLIC BLOOD PRESSURE: 78 MMHG

## 2017-07-21 VITALS — SYSTOLIC BLOOD PRESSURE: 96 MMHG | RESPIRATION RATE: 18 BRPM | DIASTOLIC BLOOD PRESSURE: 72 MMHG | HEART RATE: 110 BPM

## 2017-07-21 VITALS — SYSTOLIC BLOOD PRESSURE: 122 MMHG | RESPIRATION RATE: 17 BRPM | HEART RATE: 100 BPM | DIASTOLIC BLOOD PRESSURE: 77 MMHG

## 2017-07-21 VITALS — SYSTOLIC BLOOD PRESSURE: 153 MMHG | DIASTOLIC BLOOD PRESSURE: 84 MMHG | RESPIRATION RATE: 20 BRPM

## 2017-07-21 VITALS — HEART RATE: 100 BPM | RESPIRATION RATE: 18 BRPM | SYSTOLIC BLOOD PRESSURE: 112 MMHG | DIASTOLIC BLOOD PRESSURE: 86 MMHG

## 2017-07-21 VITALS — RESPIRATION RATE: 18 BRPM | DIASTOLIC BLOOD PRESSURE: 76 MMHG | SYSTOLIC BLOOD PRESSURE: 100 MMHG | HEART RATE: 107 BPM

## 2017-07-21 VITALS — DIASTOLIC BLOOD PRESSURE: 92 MMHG | RESPIRATION RATE: 17 BRPM | SYSTOLIC BLOOD PRESSURE: 131 MMHG | HEART RATE: 106 BPM

## 2017-07-21 VITALS — HEART RATE: 117 BPM

## 2017-07-21 VITALS — SYSTOLIC BLOOD PRESSURE: 111 MMHG | DIASTOLIC BLOOD PRESSURE: 87 MMHG | RESPIRATION RATE: 18 BRPM | HEART RATE: 110 BPM

## 2017-07-21 VITALS — RESPIRATION RATE: 18 BRPM | DIASTOLIC BLOOD PRESSURE: 69 MMHG | SYSTOLIC BLOOD PRESSURE: 135 MMHG

## 2017-07-21 VITALS — DIASTOLIC BLOOD PRESSURE: 99 MMHG | SYSTOLIC BLOOD PRESSURE: 165 MMHG | RESPIRATION RATE: 19 BRPM

## 2017-07-21 VITALS — HEART RATE: 107 BPM

## 2017-07-21 VITALS — SYSTOLIC BLOOD PRESSURE: 149 MMHG | RESPIRATION RATE: 16 BRPM | DIASTOLIC BLOOD PRESSURE: 72 MMHG

## 2017-07-21 VITALS — DIASTOLIC BLOOD PRESSURE: 86 MMHG | HEART RATE: 106 BPM | SYSTOLIC BLOOD PRESSURE: 112 MMHG | RESPIRATION RATE: 18 BRPM

## 2017-07-21 VITALS — SYSTOLIC BLOOD PRESSURE: 156 MMHG | DIASTOLIC BLOOD PRESSURE: 72 MMHG | RESPIRATION RATE: 18 BRPM

## 2017-07-21 VITALS — SYSTOLIC BLOOD PRESSURE: 156 MMHG | RESPIRATION RATE: 18 BRPM | DIASTOLIC BLOOD PRESSURE: 91 MMHG

## 2017-07-21 VITALS — HEART RATE: 103 BPM

## 2017-07-21 VITALS — HEART RATE: 116 BPM

## 2017-07-21 VITALS — HEART RATE: 118 BPM | RESPIRATION RATE: 18 BRPM | DIASTOLIC BLOOD PRESSURE: 91 MMHG | SYSTOLIC BLOOD PRESSURE: 135 MMHG

## 2017-07-21 VITALS — HEART RATE: 101 BPM

## 2017-07-21 VITALS — HEART RATE: 110 BPM | RESPIRATION RATE: 17 BRPM | SYSTOLIC BLOOD PRESSURE: 122 MMHG | DIASTOLIC BLOOD PRESSURE: 58 MMHG

## 2017-07-21 LAB
ABNORMAL IP MESSAGE: 1
ADD SCAN DIFF: NO
ANION GAP SERPL CALC-SCNC: 19 MMOL/L (ref 8–16)
BASOPHILS # BLD AUTO: 0 10^3/UL (ref 0–0.1)
BASOPHILS NFR BLD: 0.6 % (ref 0–2)
BUN SERPL-MCNC: 16 MG/DL (ref 7–20)
CALCIUM SERPL-MCNC: 9.1 MG/DL (ref 8.4–10.2)
CHLORIDE SERPL-SCNC: 102 MMOL/L (ref 97–110)
CHOLEST SERPL-MCNC: 158 MG/DL (ref 100–200)
CHOLEST/HDLC SERPL: 2.7 RATIO
CO2 SERPL-SCNC: 29 MMOL/L (ref 21–31)
CREAT SERPL-MCNC: 0.77 MG/DL (ref 0.61–1.24)
EOSINOPHIL # BLD: 0 10^3/UL (ref 0–0.5)
EOSINOPHIL NFR BLD: 0.6 % (ref 0–7)
ERYTHROCYTE [DISTWIDTH] IN BLOOD BY AUTOMATED COUNT: 16.3 % (ref 11.5–14.5)
GLUCOSE SERPL-MCNC: 98 MG/DL (ref 70–220)
HCT VFR BLD CALC: 49.1 % (ref 42–52)
HDLC SERPL-MCNC: 58 MG/DL (ref 31–75)
HGB BLD-MCNC: 16.1 G/DL (ref 14–18)
LYMPHOCYTES # BLD AUTO: 0.4 10^3/UL (ref 0.8–2.9)
LYMPHOCYTES NFR BLD AUTO: 11.5 % (ref 15–51)
MAGNESIUM SERPL-MCNC: 2.1 MG/DL (ref 1.7–2.5)
MCH RBC QN AUTO: 31 PG (ref 29–33)
MCHC RBC AUTO-ENTMCNC: 32.8 G/DL (ref 32–37)
MCV RBC AUTO: 94.4 FL (ref 82–101)
MONOCYTES # BLD: 0.4 10^3/UL (ref 0.3–0.9)
MONOCYTES NFR BLD: 11.2 % (ref 0–11)
NEUTROPHILS # BLD: 2.6 10^3/UL (ref 1.6–7.5)
NEUTROPHILS NFR BLD AUTO: 75.5 % (ref 39–77)
NRBC # BLD MANUAL: 0 10^3/UL (ref 0–0)
PLATELET # BLD: 118 10^3/UL (ref 140–415)
PMV BLD AUTO: 11.8 FL (ref 7.4–10.4)
POTASSIUM SERPL-SCNC: 4.3 MMOL/L (ref 3.5–5.1)
RBC # BLD AUTO: 5.2 10^6/UL (ref 4.7–6.1)
SODIUM SERPL-SCNC: 146 MMOL/L (ref 135–144)
TRIGL SERPL-MCNC: 77 MG/DL (ref 0–149)
WBC # BLD AUTO: 3.5 10^3/UL (ref 4.8–10.8)

## 2017-07-21 PROCEDURE — 0DJ08ZZ INSPECTION OF UPPER INTESTINAL TRACT, VIA NATURAL OR ARTIFICIAL OPENING ENDOSCOPIC: ICD-10-PCS | Performed by: INTERNAL MEDICINE

## 2017-07-21 RX ADMIN — METOPROLOL SUCCINATE SCH MG: 100 TABLET, FILM COATED, EXTENDED RELEASE ORAL at 20:53

## 2017-07-21 RX ADMIN — METOPROLOL SUCCINATE SCH MG: 100 TABLET, FILM COATED, EXTENDED RELEASE ORAL at 10:37

## 2017-07-21 RX ADMIN — SUCRALFATE SCH GM: 1 TABLET ORAL at 18:12

## 2017-07-21 RX ADMIN — SUCRALFATE SCH GM: 1 TABLET ORAL at 09:00

## 2017-07-21 RX ADMIN — AMIODARONE HYDROCHLORIDE SCH MG: 200 TABLET ORAL at 20:53

## 2017-07-21 RX ADMIN — SUCRALFATE SCH GM: 1 TABLET ORAL at 20:52

## 2017-07-21 RX ADMIN — ATORVASTATIN CALCIUM SCH MG: 40 TABLET, FILM COATED ORAL at 20:52

## 2017-07-21 RX ADMIN — ASPIRIN SCH MG: 81 TABLET, COATED ORAL at 09:00

## 2017-07-21 RX ADMIN — AMIODARONE HYDROCHLORIDE SCH MG: 200 TABLET ORAL at 10:38

## 2017-07-21 RX ADMIN — APIXABAN SCH MG: 5 TABLET, FILM COATED ORAL at 09:00

## 2017-07-21 RX ADMIN — SUCRALFATE SCH GM: 1 TABLET ORAL at 12:26

## 2017-07-21 RX ADMIN — APIXABAN SCH MG: 5 TABLET, FILM COATED ORAL at 20:52

## 2017-07-21 NOTE — RADRPT
Vent Rate: 105 bpm

RR Interval: 0 msec

[place] Interval: 0 msec

QRS Duration: 122 msec

QT Interval: 394 msec

QTC Interval: 520 msec

P-R-T Axis: 0 - -21 - 106 degrees

 

 Atrial flutter with rapid ventricular response and variable block

 Left bundle branch block

 Abnormal ECG

 

Electronically Signed By: Robert Sampson

[id number]

## 2017-07-21 NOTE — OPR
Date/Time of Note


Date/Time of Note


DATE: 7/21/17 


TIME: 16:52





Operative Report


Preoperative Diagnosis


Epigastric pain


Postoperative Diagnosis


Gastritis


Esophageal ring 12-13 mm in diameter


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


Operation/Procedure Performed


EGD


Surgeon:  KRISTINE LEVIN MD


Estimated Blood Loss:  none


Specimens


None


Complications:  None











KRISTINE LEVIN MD Jul 21, 2017 16:53

## 2017-07-21 NOTE — HP
DATE OF ADMISSION:  07/19/2017

 

 

 

HISTORY OF PRESENT ILLNESS:  The patient is a 76-year-old

gentleman presents with recurrent chest pain and epigastric

discomfort, belching, diaphoresis and palpitations which began

early in the morning on 07/19.  Patient was evaluated emergency

room and was admitted.

 

 

 

MEDICATIONS:  Include aspirin 81 mg orally daily.  Eliquis 5 mg

orally twice daily, metoprolol succinate 100 mg orally twice

daily, Protonix 40 mg orally daily, sucralfate 1 gram 4 times

daily.  Simvastatin 40 mg orally daily.

 

 

 

REVIEW OF SYSTEMS:

 

HEENT:  Head: No history of headaches, focal weakness, numbness

or prior history of stroke.  Eyes: No blurry vision.  No

glaucoma.  Wears glasses.  ENT: Noncontributory.

 

NECK:  No history of thyroid disease.

 

HEART:  LexiScan done a few months back, showed no evidence of a

perfusion defect.  History of hypertrophic obstructive

cardiomyopathy with paroxysmal atrial fibrillation.  Had MI last

November with a-fib and PTCA of the proximal LAD, with a drug-

eluting stent.

 

GASTROINTESTINAL:  History recurrent belching.  History of GERD

with esophagitis and no history of gall stones.  Recent

ultrasound showed no evidence of gallstones and no history of

change in bowel habits.

 

GENITOURINARY:  Was no dysuria, hematuria, or kidney stones.

 

 

 

ALLERGIES:  NO KNOWN ALLERGIES.

 

 

 

PHYSICAL EXAMINATION:

 

GENERAL APPEARANCE:  The patient is an average built male, who is

very pleasant, presently in no acute distress.

 

VITAL SIGNS:  Temperature 98.4, blood pressure 130/95,

respiratory [____] per minute, heart rate 150 per minute,

irregular about 20 minutes back.

 

HEENT:  No pallor, cyanosis, or icterus.  Tongue is moist.

 

NECK:  Supple.  No thyromegaly.  No lymphadenopathy.

 

CHEST:  Clinically clear.

 

ABDOMEN:  Obese, nontender.  No hepatosplenomegaly.

 

EXTREMITIES:  No edema.  Pedal pulses 1+ bilaterally.  Homans is

negative.

 

NEUROLOGIC:  No localizing a lateralizing signs.

 

 

 

LABORATORY DATA:  Initial WBC count shows 3.1, hematocrit 46.9,

platelet 114,000.  Sodium 146, potassium 3.7.  Troponin 0.012 and

repeat 0.017.  Lipase 149.

 

IMAGING:  Chest x-ray shows calcified pleural plaques both sides

of mid lungs.

 

 

 

IMPRESSION:

 

1.   Atypical chest pain.  Doubt acute coronary ischemia.

2.   Coronary artery disease status post PTCA of LAD 11/2016.

3.   Paroxysmal atrial fibrillation.

4.   Hypertrophic obstructive cardiomyopathy.

5.   Gastrointestinal symptoms likely related to gastroesophageal

reflux disease.

6.   Peptic ulcer disease.

7.   Hypertension.

8.   Hyperlipidemia.

9.   Status post prior cerebrovascular accident.

 

 

PLAN:  Patient will be admitted to telemetry.  Repeat troponin's.

Obtain cardiac consultation Dr. Lemus.  Will request Dr. Lr for GI consultation.  Consider upper endoscopy.  Continue

PPI and Carafate, add Mylicon.

 

 

 

 

 

 

 

Dictated By:  Kike Mata MD

 

 

 

/jorge luis/josé manuel

 

Job#:  [id number]/Document#:  [id number]

 

D:  07/20/2017 08:28

 

T:  07/21/2017 07:14

## 2017-07-21 NOTE — RADRPT
Echocardiogram Report

 

Patient Name:  JEANIE SIDDIQI  Gender:       Male

MRN:           [id number]        Accession #:  [id number]

Birth Date:    1940    Study Date:   20-Jul-2017

Sonographer:   JOSE Krueger [facility]   Location:     [id number]

 

Ref. Physician: MARNIE NAJERA

Quality: Adequate

 

Procedures: Transthoracic echocardiogram with complete 2D, M-Mode, and 

doppler examination.

Indications: Hypertrophic Cardiomyopathy. Chest Pain.

 

2D/M Mode                          Doppler

Measurement  Value  Normal Ranges  Measurement      Value  Normal Ranges

LVIDd 2D     4.4    3.5 - 5.6 cm   LESLI Vmax         1.6    cm2

LVIDs 2D     1.9    2.1 - 4.1 cm   LESLI VTI          1.9    cm2

FS 2D        58.3   %              AV Mean Neto      2.3    m/sec

LVPWd 2D     1.8    0.6 - 1.1 cm   AV Mean PG       24.0   mmHg

IVSd 2D      2.2    0.6 - 1.1 cm   AV Peak Neto      3.1    m/sec

IVS/LVPW 2D  1.2                   AV Peak PG       38.0   mmHg

AoR Diam 2D  3.5    2.0 - 3.7 cm   AV VTI           51.0   cm

LA/Ao 2D     2      0 - 1          AI Peak PG       62.0   mmHg

EDV 2D       87.5   cm3            AI Peak Neto      4.0    m/sec

ESV 2D       6.3    cm3            AI PHT           509.0  msec

LA Dimen 2D  6.2    2.3 - 4.0 cm   LVOT Mean Neto    0.9    m/sec

LVOT Diam    2.2    cm             LVOT Mean PG     4.0    mmHg

LVOT Area    3.8    cm2            LVOT Peak Neto    1.3    m/sec

LVOT Peak PG     7.0    mmHg

LVOT VTI         25.2   cm

MV PHT           114.0  msec

MV Peak Neto      2.1    m/sec

MV Peak PG       18.0   mmHg

MV Mean Neto      1.1    m/sec

MV Mean PG       6.0    mmHg

MV Decel [place]   5

MV PHT Peak Neto  1.8    m/sec

MV PHT           114.0  msec

MV VTI           43.6   cm

MVA PHT          1.9    cm2

MVA VTI          2.2    cm

TR Peak Neto      3.0    m/sec

TR Peak PG       36.0   mmHg

RVSP             39.0   mmHg

 

Findings

Left Ventricle: Hyperdynamic left ventricular systolic function.  Normal 

left ventricular cavity size.  Normal left ventricular wall thickness.  

Severe concentric left ventricular hypertrophy.  Ejection fraction is 

visually estimated at 65 %.  E/E`=16.  No left ventricular outflow tract 

gradient at rest.  Elevated e/e` ratio c/w elevated LA pressure. 

Significant Hypertrophic cardiomyopathy without obstructive component at 

rest.

Right Ventricle: Moderate right ventricular systolic dysfunction.  Not 

well visualized.

Left Atrium: There is severe enlargement of left atrium.  LA 

Dimension6.20 cm.

Right Atrium: The right atrium is normal in size.

Mitral Valve: Moderate mitral leaflet calcification.  Mild mitral 

annular calcification.  Trace mitral regurgitation.  Mitral valve Max 

Velocity 2.11 m/sec.  MaxPG 18.00 mmHg.  MeanPG 6.00 mmHg.  Mitral Valve 

Area by PHT1.93 cm2.  Mitral Valve Area by Continuity2.20 cm2.  above 

c/w mild MS.

Aortic Valve: Mild to moderate aortic stenosis.  Aortic valve Max 

velocity 3.10 m/sec.  Max PG 38.00 mmHg.  Mean PG 24.00 mmHg.  Aortic 

valve area 1.88 cm2.  Aortic cusps appear mildly calcified.  Mild aortic 

valve regurgitation.  Heavily calcified Trileaflet AV with restricted 

leaflet motion but dimensionless index .50 c/w mild to moderate AS.

Tricuspid Valve: Normal appearance of the tricuspid valve.  Estimated 

peak PA systolic pressure 39 mmHg.  There is mild tricuspid 

regurgitation.  c/w mild pulmonary HTN.

Pulmonic Valve: Pulmonic valve not well visualized.

Pericardium: Normal pericardium with no significant pericardial effusion.

Aorta: Normal aortic root.

IVC: Normal size and normal respiratory collapse consistent with normal 

right atrial pressure.

 

Conclusions

1.Hyperdynamic left ventricular systolic function.  Normal left 

ventricular cavity size.  Normal left ventricular wall thickness.  

Severe concentric left ventricular hypertrophy.  Ejection fraction is 

visually estimated at 65 %.  E/E`=16.  No left ventricular outflow tract 

gradient at rest.  Elevated e/e` ratio c/w elevated LA pressure. 

Significant Hypertrophic cardiomyopathy without obstructive component at 

rest.

2.There is severe enlargement of left atrium.  LA Dimension6.20 cm.

3.Moderate mitral leaflet calcification.  Mild mitral annular 

calcification.  Trace mitral regurgitation.  Mitral valve Max Velocity 

2.11 m/sec.  MaxPG 18.00 mmHg.  MeanPG 6.00 mmHg.  Mitral Valve Area by 

PHT1.93 cm2.  Mitral Valve Area by Continuity2.20 cm2.  above c/w mild 

MS.

4.Mild to moderate aortic stenosis.  Aortic valve Max velocity 3.10 

m/sec.  Max PG 38.00 mmHg.  Mean PG 24.00 mmHg.  Aortic valve area 1.88 

cm2.  Aortic cusps appear mildly calcified.  Mild aortic valve 

regurgitation.  Heavily calcified Trileaflet AV with restricted leaflet 

motion but dimensionless index .50 c/w mild to moderate AS.

5.Normal appearance of the tricuspid valve.  Estimated peak PA systolic 

pressure 39 mmHg.  There is mild tricuspid regurgitation.  c/w mild 

pulmonary HTN.

6.Normal pericardium with no significant pericardial effusion.

7.Normal size and normal respiratory collapse consistent with normal 

right atrial pressure.

8.No Vegetation, masses, or thrombi seen.

 

Electronically Signed By:

Marnie Najera

21-Jul-2017 08:16:06  -0700

 

Patient Name: JEANIE SIDDIQI

MRN: [id number]

Study Date: 20-Jul-2017

 

[id number]

## 2017-07-22 VITALS — RESPIRATION RATE: 16 BRPM | DIASTOLIC BLOOD PRESSURE: 75 MMHG | SYSTOLIC BLOOD PRESSURE: 113 MMHG

## 2017-07-22 VITALS — HEART RATE: 109 BPM

## 2017-07-22 VITALS — HEART RATE: 94 BPM

## 2017-07-22 VITALS — RESPIRATION RATE: 17 BRPM | SYSTOLIC BLOOD PRESSURE: 125 MMHG | DIASTOLIC BLOOD PRESSURE: 82 MMHG

## 2017-07-22 VITALS — HEART RATE: 90 BPM

## 2017-07-22 VITALS — HEART RATE: 83 BPM

## 2017-07-22 VITALS — HEART RATE: 114 BPM

## 2017-07-22 VITALS — RESPIRATION RATE: 18 BRPM | SYSTOLIC BLOOD PRESSURE: 128 MMHG | DIASTOLIC BLOOD PRESSURE: 92 MMHG

## 2017-07-22 VITALS — DIASTOLIC BLOOD PRESSURE: 86 MMHG | SYSTOLIC BLOOD PRESSURE: 142 MMHG | RESPIRATION RATE: 19 BRPM

## 2017-07-22 LAB
ABNORMAL IP MESSAGE: 1
ANION GAP SERPL CALC-SCNC: 18 MMOL/L (ref 8–16)
BASOPHILS # BLD AUTO: 0 10^3/UL (ref 0–0.1)
BASOPHILS NFR BLD: 0.2 % (ref 0–2)
BUN SERPL-MCNC: 16 MG/DL (ref 7–20)
CALCIUM SERPL-MCNC: 8.8 MG/DL (ref 8.4–10.2)
CHLORIDE SERPL-SCNC: 104 MMOL/L (ref 97–110)
CO2 SERPL-SCNC: 25 MMOL/L (ref 21–31)
CREAT SERPL-MCNC: 0.73 MG/DL (ref 0.61–1.24)
EOSINOPHIL # BLD: 0 10^3/UL (ref 0–0.5)
EOSINOPHIL NFR BLD: 0.2 % (ref 0–7)
ERYTHROCYTE [DISTWIDTH] IN BLOOD BY AUTOMATED COUNT: 16.5 % (ref 11.5–14.5)
GLUCOSE SERPL-MCNC: 97 MG/DL (ref 70–220)
HCT VFR BLD CALC: 49 % (ref 42–52)
HGB BLD-MCNC: 16 G/DL (ref 14–18)
LYMPHOCYTES # BLD AUTO: 0.5 10^3/UL (ref 0.8–2.9)
LYMPHOCYTES NFR BLD AUTO: 8.8 % (ref 15–51)
MCH RBC QN AUTO: 30.7 PG (ref 29–33)
MCHC RBC AUTO-ENTMCNC: 32.7 G/DL (ref 32–37)
MCV RBC AUTO: 94 FL (ref 82–101)
MONOCYTES # BLD: 0.6 10^3/UL (ref 0.3–0.9)
MONOCYTES NFR BLD: 10.5 % (ref 0–11)
NEUTROPHILS # BLD: 4.3 10^3/UL (ref 1.6–7.5)
NEUTROPHILS NFR BLD AUTO: 79.9 % (ref 39–77)
NRBC # BLD MANUAL: 0 10^3/UL (ref 0–0)
NRBC BLD AUTO-RTO: 0 /100WBC (ref 0–0)
PLATELET # BLD: 95 10^3/UL (ref 140–415)
PMV BLD AUTO: 11.4 FL (ref 7.4–10.4)
POSITIVE DIFF: (no result)
POTASSIUM SERPL-SCNC: 3.8 MMOL/L (ref 3.5–5.1)
RBC # BLD AUTO: 5.21 10^6/UL (ref 4.7–6.1)
SODIUM SERPL-SCNC: 143 MMOL/L (ref 135–144)
WBC # BLD AUTO: 5.4 10^3/UL (ref 4.8–10.8)

## 2017-07-22 RX ADMIN — AMIODARONE HYDROCHLORIDE SCH MG: 200 TABLET ORAL at 09:25

## 2017-07-22 RX ADMIN — APIXABAN SCH MG: 5 TABLET, FILM COATED ORAL at 09:23

## 2017-07-22 RX ADMIN — METOPROLOL SUCCINATE SCH MG: 100 TABLET, FILM COATED, EXTENDED RELEASE ORAL at 09:27

## 2017-07-22 RX ADMIN — SUCRALFATE SCH GM: 1 TABLET ORAL at 17:00

## 2017-07-22 RX ADMIN — SUCRALFATE SCH GM: 1 TABLET ORAL at 09:23

## 2017-07-22 RX ADMIN — SUCRALFATE SCH GM: 1 TABLET ORAL at 13:13

## 2017-07-22 RX ADMIN — ASPIRIN SCH MG: 81 TABLET, COATED ORAL at 09:24

## 2017-07-22 NOTE — CONS
Date/Time of Note


Date/Time of Note


DATE: 7/22/17 


TIME: 13:29





Assessment/Plan


Assessment/Plan


Chief Complaint/Hosp Course


76-year-old gentleman with a history of atrial fibrillation, hypertrophic 

cardiomyopathy, coronary artery disease status post stenting lost a stent was 

placed in November 2016, hypertension, hyperlipidemia, admitted with the 

complaints of epigastric pain which woke him up in the middle of the night.  

Patient had 2 such episode in the last few months.  No nausea no vomiting no 

heartburn he has been taking PPIs no relief.  He sonogram in the past had been 

negative the last ultrasound of the abdomen was 3 months ago.  Patient also 

stated that he had ultrasound done today I do not have any official report but 

as per the patient it was negative.  No GI bleeding no chest pain or shortness 

of breath.  He was evaluated by the cardiologist.  Patient had declined 

endoscopy in the past.


Problems:  


Additional Assessment/Plan


Additional Assessment/Plan


1.  Abdominal pain and retrosternal discomfort atypical


2.  Atrial fibrillation


3.  Coronary artery disease status post stenting in November


4.  Hypertension


5.  Hypertrophic cardiomyopathy


6.  CVA without any residual effect.


7.  Esophageal ring 12-13 mm in diameter with possible eosinophilic esophagitis


Plan


Continue with PPI


Dietary modification discussed with the patient.


Patient may need dilatation of the ring in the future.





Consultation Date/Type/Reason


Admit Date/Time


Jul 21, 2017 at 09:49


Initial Consult Date








24 HR Interval Summary


Constitutional:  improved, no complaints





Exam/Review of Systems


Vital Signs


Vitals





 Vital Signs








  Date Time  Temp Pulse Resp B/P Pulse Ox O2 Delivery O2 Flow Rate FiO2


 


7/22/17 12:30  90      


 


7/22/17 11:59 97.8  16 113/75 96   


 


7/21/17 18:02      Nasal Cannula 2.0 














 Intake and Output   


 


 7/21/17 7/21/17 7/22/17





 15:00 23:00 07:00


 


Intake Total  220 ml 


 


Balance  220 ml 











Exam


Constitutional:  alert, oriented, well developed


Psych:  nl mood/affect, no complaints


Head:  atraumatic, normocephalic


Eyes:  EOMI, PERRL, nl conjunctiva, nl lids, nl sclera


ENMT:  nl external ears & nose, nl lips & teeth, nl nasal mucosa & septum


Neck:  non-tender, supple


Respiratory:  clear to auscultation, normal air movement


Cardiovascular:  nl pulses, regular rate and rhythm


Gastrointestinal:  nl liver, spleen, non-tender, soft


Musculoskeletal:  nl extremities to inspection, nl gait and stance


Extremities:  normal pulses


Neurological:  CNS II-XII intact, nl mental status, nl speech, nl strength


Skin:  nl turgor, 


   No rash or lesions


Lymph:  nl lymph nodes





Results


Result Diagram:  


7/22/17 0736                                                                   

             7/22/17 0736





Results 24 hrs





Laboratory Tests








Test


  7/22/17


07:36


 


White Blood Count 5.4  #


 


Red Blood Count 5.21  


 


Hemoglobin 16.0  


 


Hematocrit 49.0  


 


Mean Corpuscular Volume 94.0  


 


Mean Corpuscular Hemoglobin 30.7  


 


Mean Corpuscular Hemoglobin


Concent 32.7  


 


 


Red Cell Distribution Width 16.5  H


 


Platelet Count 95  L


 


Mean Platelet Volume 11.4  H


 


Neutrophils % 79.9  H


 


Lymphocytes % 8.8  L


 


Monocytes % 10.5  


 


Eosinophils % 0.2  


 


Basophils % 0.2  


 


Nucleated Red Blood Cells % 0.0  


 


Neutrophils # 4.3  


 


Lymphocytes # 0.5  L


 


Monocytes # 0.6  


 


Eosinophils # 0.0  


 


Basophils # 0.0  


 


Nucleated Red Blood Cells # 0.0  


 


Sodium Level 143  


 


Potassium Level 3.8  


 


Chloride Level 104  


 


Carbon Dioxide Level 25  


 


Anion Gap 18  H


 


Blood Urea Nitrogen 16  


 


Creatinine 0.73  


 


Glucose Level 97  


 


Calcium Level 8.8  











Medications


Medications





 Current Medications


Atorvastatin Calcium (Lipitor) 40 mg HS PO  Last administered on 7/21/17at 20:52

; Admin Dose 40 MG;  Start 7/19/17 at 21:00


Sucralfate (Carafate) 1 gm QID PO  Last administered on 7/22/17at 13:13; Admin 

Dose 1 GM;  Start 7/19/17 at 17:00


Apixaban (Eliquis) 5 mg BID PO  Last administered on 7/22/17at 09:23; Admin 

Dose 5 MG;  Start 7/19/17 at 21:00


Lorazepam (Ativan) 0.5 mg Q4  PRN PO AGITATION;  Start 7/19/17 at 14:00


Simethicone (Mylicon) 80 mg QID  PRN GTB DISTENSION/GAS/BLOATING;  Start 7/20/ 17 at 08:30


Aspirin (Halfprin) 81 mg DAILY PO  Last administered on 7/22/17at 09:24; Admin 

Dose 81 MG;  Start 7/20/17 at 09:00


Amiodarone HCl (Cordarone) 400 mg BID PO  Last administered on 7/22/17at 09:25; 

Admin Dose 400 MG;  Start 7/20/17 at 09:00


Metoprolol Succinate (Toprol Xl) 100 mg BID PO  Last administered on 7/22/17at 

09:27; Admin Dose 100 MG;  Start 7/20/17 at 09:30











KRISTINE LEVIN MD Jul 22, 2017 13:30

## 2017-07-22 NOTE — CONS
Date/Time of Note


Date/Time of Note


DATE: 7/22/17 


TIME: 13:18





Assessment/Plan


Assessment/Plan


Chief Complaint/Hosp Course


76 yom w/ HCM, CAD (s/p PCI of LAD, RCA), PAF, htn, hld, and CVA who presented 

with abdominal/epigastric pain.  EGD revealed an esophageal ring.  Plan for 

conservative therapy (diet modification).  With regards to his afib, rates in 80

-90s mostly.  Would lower amiodarone to 400mg daily (can be lowered to 200mg 

daily as outpt). Continue toprol xl, eliquis, aspirin and statin.  Ok to d/c 

from a cardiac standpoint.


Problems:  





Consultation Date/Type/Reason


Admit Date/Time


Jul 21, 2017 at 09:49


Initial Consult Date





Reason for Consultation


HCM, afib





24 HR Interval Summary


Free Text/Dictation


Pt denies cp, sob, epigastric pain.  Telemetry reveals afib with rates 80-90s 

mostly (high was 110s).





Exam/Review of Systems


Vital Signs


Vitals





 Vital Signs








  Date Time  Temp Pulse Resp B/P Pulse Ox O2 Delivery O2 Flow Rate FiO2


 


7/22/17 12:30  90      


 


7/22/17 11:59 97.8  16 113/75 96   


 


7/21/17 18:02      Nasal Cannula 2.0 














 Intake and Output   


 


 7/21/17 7/21/17 7/22/17





 15:00 23:00 07:00


 


Intake Total  220 ml 


 


Balance  220 ml 











Exam


Constitutional:  alert, 


   No distress


Neck:  No jvd


Respiratory:  clear to auscultation


Cardiovascular:  irregular rhythm, other (4/6 HSM along LLSB), 


   No regular rate and rhythm


Extremities:  No edema





Results


Result Diagram:  


7/22/17 0736                                                                   

             7/22/17 0736





Results 24 hrs





Laboratory Tests








Test


  7/22/17


07:36


 


White Blood Count 5.4  #


 


Red Blood Count 5.21  


 


Hemoglobin 16.0  


 


Hematocrit 49.0  


 


Mean Corpuscular Volume 94.0  


 


Mean Corpuscular Hemoglobin 30.7  


 


Mean Corpuscular Hemoglobin


Concent 32.7  


 


 


Red Cell Distribution Width 16.5  H


 


Platelet Count 95  L


 


Mean Platelet Volume 11.4  H


 


Neutrophils % 79.9  H


 


Lymphocytes % 8.8  L


 


Monocytes % 10.5  


 


Eosinophils % 0.2  


 


Basophils % 0.2  


 


Nucleated Red Blood Cells % 0.0  


 


Neutrophils # 4.3  


 


Lymphocytes # 0.5  L


 


Monocytes # 0.6  


 


Eosinophils # 0.0  


 


Basophils # 0.0  


 


Nucleated Red Blood Cells # 0.0  


 


Sodium Level 143  


 


Potassium Level 3.8  


 


Chloride Level 104  


 


Carbon Dioxide Level 25  


 


Anion Gap 18  H


 


Blood Urea Nitrogen 16  


 


Creatinine 0.73  


 


Glucose Level 97  


 


Calcium Level 8.8  











Medications


Medications





 Current Medications


Atorvastatin Calcium (Lipitor) 40 mg HS PO  Last administered on 7/21/17at 20:52

; Admin Dose 40 MG;  Start 7/19/17 at 21:00


Sucralfate (Carafate) 1 gm QID PO  Last administered on 7/22/17at 09:23; Admin 

Dose 1 GM;  Start 7/19/17 at 17:00


Apixaban (Eliquis) 5 mg BID PO  Last administered on 7/22/17at 09:23; Admin 

Dose 5 MG;  Start 7/19/17 at 21:00


Lorazepam (Ativan) 0.5 mg Q4  PRN PO AGITATION;  Start 7/19/17 at 14:00


Simethicone (Mylicon) 80 mg QID  PRN GTB DISTENSION/GAS/BLOATING;  Start 7/20/ 17 at 08:30


Aspirin (Halfprin) 81 mg DAILY PO  Last administered on 7/22/17at 09:24; Admin 

Dose 81 MG;  Start 7/20/17 at 09:00


Amiodarone HCl (Cordarone) 400 mg BID PO  Last administered on 7/22/17at 09:25; 

Admin Dose 400 MG;  Start 7/20/17 at 09:00


Metoprolol Succinate (Toprol Xl) 100 mg BID PO  Last administered on 7/22/17at 

09:27; Admin Dose 100 MG;  Start 7/20/17 at 09:30











NALDO MARTIN Jul 22, 2017 13:20

## 2017-07-24 NOTE — RADRPT
Vent Rate: 99 bpm

RR Interval: 0 msec

SC Interval: 0 msec

QRS Duration: 134 msec

QT Interval: 402 msec

QTC Interval: 515 msec

P-R-T Axis: 0 - 10 - 18 degrees

 

 Atrial fibrillation

 Nonspecific intraventricular block

 T wave abnormality, consider inferior ischemia or digitalis effect

 Abnormal ECG

 

Electronically Signed By: Robert Sampson

[id number]

## 2017-07-24 NOTE — DS
DATE OF ADMISSION:  07/19/2017

 

DATE OF DISCHARGE:  07/22/2017

 

 

 

FINAL DIAGNOSES:

 

1.   Atypical chest pain, no evidence of acute myocardial

  infarction.

2.   Coronary disease status post percutaneous coronary

intervention of left anterior descending and right coronary

artery.

3.   Paroxysmal atrial fibrillation.

4.   Hypertension.

5.   Status post cerebrovascular accident about a year ago.

6.   Epigastric pain secondary to gastritis and evidence of

esophageal ring.

 

 

HOSPITAL COURSE:  The patient is a 76-year-old gentleman with

history of hypertrophic obstructive cardiomyopathy with

paroxysmal atrial fibrillation, presenting with recurrent chest

pain and epigastric discomfort, belching, diaphoresis and

palpitations.  Evaluated in the emergency room and was admitted.

The patient was admitted and had no evidence of acute myocardial

infarction.  Troponins were negative.  The patient was seen by

 __________ who recommended adding amiodarone 400 mg p.o.

b.i.d., along with continue metoprolol 100 mg b.i.d. Dr. Lr

evaluated the patient from GI standpoint and upper endoscopy

showed esophageal ring.   __________ saw the patient on

07/22/2017 and recommended decreasing the amiodarone to 400 mg

daily and then decreasing to 200 mg as an outpatient.  Patient

was discharged home in much improved condition on amiodarone 400

mg p.o. daily; Eliquis 5 mg b.i.d.; aspirin 81 mg p.o. daily;

atorvastatin 40 mg nightly; metoprolol 100 mg p.o. b.i.d.;

Carafate 1 gram p.o. q.i.d. The patient will follow up with me in

the course of next 2 weeks.

 

 

 

DISCHARGE CONDITION:  Much improved.

 

 

 

 

 

 

 

Dictated By:  Kike Mata MD

 

 

 

/jorge luis/lolly

 

Job#:  [id number]/Document#:  [id number]

 

D:  07/22/2017 14:24

 

T:  07/23/2017 10:38

## 2017-07-25 NOTE — GILP
DATE OF PROCEDURE:  07/21/2017

 

 

 

PROCEDURE PERFORMED:  Esophagogastroduodenoscopy.

 

 

 

INDICATION:  Epigastric pain.  Atypical chest pain.  Cardiac

workup negative.

 

 

 

DESCRIPTION OF PROCEDURE:  Informed consent:  The risks of the

procedure, related and unrelated complications, anesthetic risks

and alternatives discussed.  Informed consent was obtained.

 

 

The patient was brought to the GI lab, sedated by the

anesthesiologist.  After optimal sedation the scope was passed

with much ease into esophagus.  The patient had a ring-like

structure in the distal and mid portion of the esophagus,

endoscopically consistent with the diagnosis of eosinophilic

esophagitis.  At the GE junction, he had a classical Schatzki

ring.  The diameter was about 12 mm and no dilatation was

performed because of all the double anti-platelet agents.

Stomach mucosa revealed Gastritis.  Duodenum, first and second

part, including ampulla appeared normal.  Retroversion was

normal.  Scope was straightened out and removed with good patient

tolerance.

 

 

 

IMPRESSION:

 

1.   Schatzki ring or mucosal ring, 12 mm in diameter.

2.   Eosinophilic esophagitis.

3.   Gastritis.

4.   Normal duodenal ampulla.

 

 

PLAN:

 

1.   To continue PPI or double the dose of PPI, which will help

  him also for eosinophilic esophagitis.

2.   If the condition does not improve, then in the future he

needs a biopsy and dilatation of the GE junction.

3.   If the biopsy shows eosinophilia, then we can put him on

fluticasone to be swallowed twice a day 800 mcg b.i.d.

 

 

 

 

 

 

Dictated By:  Rishi Lr MD

 

 

 

/jorge luis/aleida

 

Job#:  [id number]/Document#:  [id number]

 

D:  07/21/2017 16:56

 

T:  07/25/2017 07:11

 

CC:  Kike Mata MD;*[facility]*

## 2017-07-27 ENCOUNTER — HOSPITAL ENCOUNTER (OUTPATIENT)
Dept: HOSPITAL 10 - E/R | Age: 77
Setting detail: OBSERVATION
LOS: 2 days | Discharge: HOME | End: 2017-07-29
Attending: INTERNAL MEDICINE | Admitting: INTERNAL MEDICINE
Payer: MEDICARE

## 2017-07-27 VITALS
HEIGHT: 72 IN | BODY MASS INDEX: 27.8 KG/M2 | HEIGHT: 72 IN | BODY MASS INDEX: 27.8 KG/M2 | WEIGHT: 205.25 LBS | WEIGHT: 205.25 LBS

## 2017-07-27 VITALS — RESPIRATION RATE: 22 BRPM | DIASTOLIC BLOOD PRESSURE: 105 MMHG | SYSTOLIC BLOOD PRESSURE: 180 MMHG | HEART RATE: 102 BPM

## 2017-07-27 VITALS — RESPIRATION RATE: 18 BRPM | DIASTOLIC BLOOD PRESSURE: 99 MMHG | SYSTOLIC BLOOD PRESSURE: 135 MMHG

## 2017-07-27 VITALS — SYSTOLIC BLOOD PRESSURE: 150 MMHG | DIASTOLIC BLOOD PRESSURE: 90 MMHG | RESPIRATION RATE: 20 BRPM

## 2017-07-27 VITALS — HEART RATE: 114 BPM

## 2017-07-27 VITALS — HEART RATE: 102 BPM

## 2017-07-27 VITALS — TEMPERATURE: 98.2 F

## 2017-07-27 DIAGNOSIS — I25.10: ICD-10-CM

## 2017-07-27 DIAGNOSIS — Z79.82: ICD-10-CM

## 2017-07-27 DIAGNOSIS — Z86.73: ICD-10-CM

## 2017-07-27 DIAGNOSIS — K21.9: ICD-10-CM

## 2017-07-27 DIAGNOSIS — J92.9: ICD-10-CM

## 2017-07-27 DIAGNOSIS — M94.0: ICD-10-CM

## 2017-07-27 DIAGNOSIS — I10: ICD-10-CM

## 2017-07-27 DIAGNOSIS — E78.5: ICD-10-CM

## 2017-07-27 DIAGNOSIS — I48.0: ICD-10-CM

## 2017-07-27 DIAGNOSIS — Z79.01: ICD-10-CM

## 2017-07-27 DIAGNOSIS — Z95.5: ICD-10-CM

## 2017-07-27 DIAGNOSIS — R07.89: Primary | ICD-10-CM

## 2017-07-27 DIAGNOSIS — I42.1: ICD-10-CM

## 2017-07-27 LAB
ABNORMAL IP MESSAGE: 1
ANION GAP SERPL CALC-SCNC: 17 MMOL/L (ref 8–16)
APTT BLD: 33 SEC (ref 25–35)
BASOPHILS # BLD AUTO: 0 10^3/UL (ref 0–0.1)
BASOPHILS NFR BLD: 0.7 % (ref 0–2)
BUN SERPL-MCNC: 9 MG/DL (ref 7–20)
CALCIUM SERPL-MCNC: 8.6 MG/DL (ref 8.4–10.2)
CHLORIDE SERPL-SCNC: 109 MMOL/L (ref 97–110)
CK MB SERPL-MCNC: 1.15 NG/ML (ref 0–2.4)
CK MB SERPL-MCNC: 1.23 NG/ML (ref 0–2.4)
CK SERPL-CCNC: 45 IU/L (ref 23–200)
CK SERPL-CCNC: 59 IU/L (ref 23–200)
CO2 SERPL-SCNC: 30 MMOL/L (ref 21–31)
CREAT SERPL-MCNC: 0.76 MG/DL (ref 0.61–1.24)
EOSINOPHIL # BLD: 0 10^3/UL (ref 0–0.5)
EOSINOPHIL NFR BLD: 0.3 % (ref 0–7)
ERYTHROCYTE [DISTWIDTH] IN BLOOD BY AUTOMATED COUNT: 16 % (ref 11.5–14.5)
GLUCOSE SERPL-MCNC: 89 MG/DL (ref 70–220)
HCT VFR BLD CALC: 47.1 % (ref 42–52)
HGB BLD-MCNC: 15.5 G/DL (ref 14–18)
INR PPP: 1.13
LYMPHOCYTES # BLD AUTO: 0.4 10^3/UL (ref 0.8–2.9)
LYMPHOCYTES NFR BLD AUTO: 14.7 % (ref 15–51)
MCH RBC QN AUTO: 31.4 PG (ref 29–33)
MCHC RBC AUTO-ENTMCNC: 32.9 G/DL (ref 32–37)
MCV RBC AUTO: 95.5 FL (ref 82–101)
MONOCYTES # BLD: 0.5 10^3/UL (ref 0.3–0.9)
MONOCYTES NFR BLD: 16.7 % (ref 0–11)
NEUTROPHILS # BLD: 2 10^3/UL (ref 1.6–7.5)
NEUTROPHILS NFR BLD AUTO: 66.9 % (ref 39–77)
NRBC # BLD MANUAL: 0 10^3/UL (ref 0–0)
NRBC BLD AUTO-RTO: 0 /100WBC (ref 0–0)
PLATELET # BLD: 160 10^3/UL (ref 140–415)
PMV BLD AUTO: 10.7 FL (ref 7.4–10.4)
POSITIVE DIFF: (no result)
POTASSIUM SERPL-SCNC: 3.5 MMOL/L (ref 3.5–5.1)
PROTHROMBIN TIME: 14.5 SEC (ref 12.2–14.2)
PT RATIO: 1.1
RBC # BLD AUTO: 4.93 10^6/UL (ref 4.7–6.1)
SODIUM SERPL-SCNC: 152 MMOL/L (ref 135–144)
TROPONIN I SERPL-MCNC: < 0.012 NG/ML (ref 0–0.12)
WBC # BLD AUTO: 3 10^3/UL (ref 4.8–10.8)

## 2017-07-27 PROCEDURE — 85610 PROTHROMBIN TIME: CPT

## 2017-07-27 PROCEDURE — 83735 ASSAY OF MAGNESIUM: CPT

## 2017-07-27 PROCEDURE — 96376 TX/PRO/DX INJ SAME DRUG ADON: CPT

## 2017-07-27 PROCEDURE — 80048 BASIC METABOLIC PNL TOTAL CA: CPT

## 2017-07-27 PROCEDURE — 99217: CPT

## 2017-07-27 PROCEDURE — 85025 COMPLETE CBC W/AUTO DIFF WBC: CPT

## 2017-07-27 PROCEDURE — 36415 COLL VENOUS BLD VENIPUNCTURE: CPT

## 2017-07-27 PROCEDURE — 80053 COMPREHEN METABOLIC PANEL: CPT

## 2017-07-27 PROCEDURE — 71010: CPT

## 2017-07-27 PROCEDURE — G0378 HOSPITAL OBSERVATION PER HR: HCPCS

## 2017-07-27 PROCEDURE — 71100 X-RAY EXAM RIBS UNI 2 VIEWS: CPT

## 2017-07-27 PROCEDURE — 71275 CT ANGIOGRAPHY CHEST: CPT

## 2017-07-27 PROCEDURE — 70450 CT HEAD/BRAIN W/O DYE: CPT

## 2017-07-27 PROCEDURE — 82550 ASSAY OF CK (CPK): CPT

## 2017-07-27 PROCEDURE — 85730 THROMBOPLASTIN TIME PARTIAL: CPT

## 2017-07-27 PROCEDURE — 99285 EMERGENCY DEPT VISIT HI MDM: CPT

## 2017-07-27 PROCEDURE — 84484 ASSAY OF TROPONIN QUANT: CPT

## 2017-07-27 PROCEDURE — 96374 THER/PROPH/DIAG INJ IV PUSH: CPT

## 2017-07-27 PROCEDURE — 82553 CREATINE MB FRACTION: CPT

## 2017-07-27 PROCEDURE — 96375 TX/PRO/DX INJ NEW DRUG ADDON: CPT

## 2017-07-27 PROCEDURE — 93005 ELECTROCARDIOGRAM TRACING: CPT

## 2017-07-27 RX ADMIN — APIXABAN SCH MG: 5 TABLET, FILM COATED ORAL at 20:59

## 2017-07-27 RX ADMIN — DEXAMETHASONE SODIUM PHOSPHATE PRN MG: 10 INJECTION, SOLUTION INTRAMUSCULAR; INTRAVENOUS at 21:00

## 2017-07-27 RX ADMIN — DEXAMETHASONE SODIUM PHOSPHATE PRN MG: 10 INJECTION, SOLUTION INTRAMUSCULAR; INTRAVENOUS at 17:01

## 2017-07-27 RX ADMIN — SUCRALFATE SCH GM: 1 TABLET ORAL at 20:58

## 2017-07-27 RX ADMIN — PANTOPRAZOLE SODIUM SCH MG: 40 TABLET, DELAYED RELEASE ORAL at 17:31

## 2017-07-27 RX ADMIN — METOPROLOL TARTRATE SCH MG: 100 TABLET, FILM COATED ORAL at 20:59

## 2017-07-27 RX ADMIN — POTASSIUM PHOSPHATE, MONOBASIC AND POTASSIUM PHOSPHATE, DIBASIC SCH MLS/HR: 224; 236 INJECTION, SOLUTION, CONCENTRATE INTRAVENOUS at 18:11

## 2017-07-27 NOTE — RADRPT
PROCEDURE:  CT brain without contrast

 

CLINICAL INDICATION:  Head trauma/injury

 

TECHNIQUE:  CT of the brain without contrast performed on a multidetector CT scanner, with multiplan
ar reformats.  One or more of the following dose reduction techniques were used: Automated exposure 
control, adjustment in mA and / or kV according to patient size, use of iterative reconstructive chauncey
hnique.  CTDIvol = 45 mGy; DLP = 630 mGy-cm.

 

COMPARISON:   None available 

 

FINDINGS:

There is a chronic infarct in the medial right temporal and occipital lobes in the PCA territory.  T
here is mild ex vacuo dilation of the right atrium.  No acute intracranial hemorrhage is identified.
  No extra-axial fluid collection is seen.

 

There is no mass effect.  No midline shift is identified.

 

The ventricles and sulci are otherwise mildly enlarged compatible with generalized volume loss.  

 

There are mild areas of hypodensity in the periventricular - deep white matter which are nonspecific
 but suggestive of chronic small vessel ischemic changes.  Gray-white differentiation is otherwise p
reserved.  

 

Atherosclerotic calcifications of the intracranial internal carotid arteries are noted.

 

Osseous structures are unremarkable.  Mastoid air cells and imaged paranasal sinuses grossly clear. 
  

 

 

IMPRESSION:

1.  No evidence of acute intracranial pathology. 

2.  Chronic right PCA territory infarct.

3.  Mild generalized volume loss, with mild chronic small vessel ischemic changes.

 

 

RPTAT: VV

_____________________________________________ 

.Cecilio Morrison MD, MD           Date    Time 

Electronically viewed and signed by .Cecilio Morrison MD, MD on 07/27/2017 08:26 

 

D:  07/27/2017 08:26  T:  07/27/2017 08:26

.O/

## 2017-07-27 NOTE — CONS
Date/Time of Note


Date/Time of Note


DATE: 7/27/17 


TIME: 16:49





Assessment/Plan


Assessment/Plan


Chief Complaint/Hosp Course


1.  atypical chest pain-  very ttp over l chest, but no e/o trauma/injury. ? 

neuropathic vs msk.  pt denies any rash.  low suspicion for cardiac etiology, 

has r/o for acs.





- con asa 81mg daily


- cont statin


- cont bb for rate control


- no further cardiac workup recommended 


- non cardiac cp treatment per primary





2.  Atrial fibrillation.  chronic, persistent, nonvalvular.  The patient on 

Eliquis for stroke prophylaxis given elevated CHADS-VASc score in the setting 

of htn, age, previous stroke.  


- monitor k/mag, 


- cont po metoprolol  for rate control


- would add back dilt for better rate control, if bp elevated consider 

verapamil rather than dilt 


- cont tele monitoring


- would d/c amiodarone given persistent afib, no real benefit with potential 

toxicity





3. Hypertrophic obstructive cardiomyopathy.    


- Would be important to limit heart rate given potential outflow obstruction


- cont bp control, avoid nitrates





4.  Hypertension- elevated


- consider add dilt vs verapamil


- could also add acei 





5. Hyperlipidemia


- on statin.





6 h/o CVA- on anticoag, CT head with old PCA infarct . 


-  No focal defects at this time. 


-Continue with anticoagulation as above.


Problems:  





Consultation Date/Type/Reason


Admit Date/Time


Jul 27, 2017 at 10:44


Date of Consultation:  Jul 27, 2017


Type of Consultation:  Cardiology


Reason for Consultation


Chest pain


Referring Provider:  ASAD VAUGHN MD





Hx of Present Illness


Mr. Glez is a pleasant 76 y.o. man with a history of CAD s/p PCI to LAD 11/2016

, RCA 2010, chronic afib on warfarin, aortic stenosis,  hypertension, and 

hypertrophic obstructive cardiomyopathy.  Pt presented to [facility] a few days ago 

for atypical cp, r/o for acs, gi w/u showed esphageal web.  Pt now returns for 

one day of chest pain. States awoke with a sharp pain in l chest, worse with 

any movement and palpation . states pain is now dull constant mild but severe 

with movement palpation.  occurs at rest or activity, no sig change. no nausea/

vomitting, sob, sweating, jaw/arm pain.  not similar to previous cardiac pain.  

no change with eating. denies any trauma/rash. serial trop negative. EKG with 

afib, and lbbb no acute changes.  denies any pnd, orthopnea, edema. no 

palpitations, dizziness, bleeding.


onstitutional:  no complaints


Eyes:  no complaints


ENT:  no complaints


Cardiovascular:  chest pain


Gastrointestinal:  no complaints


Genitourinary:  no complaints


Musculoskeletal:  no complaints


Skin:  no complaints


Neurologic:  no complaints


Psychological:  no complaints


Immunologic:  no complaints





Past Medical History


Active Problems


  Aortic stenosis (424.1) (I35.0)


  Aortic valve sclerosis (424.1) (I35.8)


  Arteriosclerotic coronary artery disease (414.00) (I25.10)


  Angina and abn.stress [place] 2.5X18 stent mid RCA feb.2010 Prox LAD 40%


    NSTEMI 11/2016- PCI proximal LAD [place] MONIKA 3.5x12mm post dil 4.0mm


  Essential hypertension (401.9) (I10)


  Hyperlipidemia (272.4) (E78.5)


  Idiopathic hypertrophic subaortic stenosis (425.11) (I42.1)


  Murmur (785.2) (R01.1)


  Obesity (278.00) (E66.9)


  Paroxysmal atrial fibrillation (427.31) (I48.0)


  Rash (782.1) (R21)


  Ventral hernia (553.20) (K43.9)





Past Medical History


  History of Epistaxis, recurrent (784.7) (R04.0)


  History of chest pain (V13.89) (Z87.898)


  History of obesity (V13.89) (Z86.39)





Past Surgical History


Past Surgical Hx:  angioplasty, other





Social History


Other Social History


  Alcohol Use


  History of heavy alcohol use in the past.Drinks 2 glasses of wine daily


  Former smoker (V15.82) (Z87.891)


  smoked for 10 years and stopped 40 years ago.


  Marital History - Currently 


  Retired





Exam/Review of Systems


Vital Signs


Vitals





 Vital Signs








  Date Time  Temp Pulse Resp B/P Pulse Ox O2 Delivery O2 Flow Rate FiO2


 


7/27/17 16:00  102      


 


7/27/17 15:24 97.6  22 180/105 95 Room Air  


 


7/27/17 08:11       0 











Exam


Constitutional:  alert, oriented


Psych:  no complaints


Head:  normocephalic


Eyes:  nl conjunctiva


ENMT:  nl nasal mucosa & septum


Neck:  supple, non-tender, 


   No jvd


Respiratory:  clear to auscultation, normal air movement


Cardiovascular:  severe ttp wiht minimal pressure L chest. regular rate and 

rhythm, nl pulses, systolic murmur (loud pitched iii/vi jerry LUSB inc with 

valsalva), 


   No bruits, No edema, No S3, No S4


Gastrointestinal:  soft, nl liver, spleen, other (obese)


Musculoskeletal:  nl extremities to inspection


Extremities:  normal pulses


Neurological:  CNS II-XII intact


Skin:  nl turgor, ue rash resolved





Results


Result Diagram:  


7/27/17 0803 7/27/17 0803





Results 24 hrs





Laboratory Tests








Test


  7/27/17


08:03 7/27/17


13:15


 


White Blood Count 3.0  #L 


 


Red Blood Count 4.93   


 


Hemoglobin 15.5   


 


Hematocrit 47.1   


 


Mean Corpuscular Volume 95.5   


 


Mean Corpuscular Hemoglobin 31.4   


 


Mean Corpuscular Hemoglobin


Concent 32.9  


  


 


 


Red Cell Distribution Width 16.0  H 


 


Platelet Count 160  # 


 


Mean Platelet Volume 10.7  H 


 


Neutrophils % 66.9   


 


Lymphocytes % 14.7  L 


 


Monocytes % 16.7  H 


 


Eosinophils % 0.3   


 


Basophils % 0.7   


 


Nucleated Red Blood Cells % 0.0   


 


Neutrophils # 2.0   


 


Lymphocytes # 0.4  L 


 


Monocytes # 0.5   


 


Eosinophils # 0.0   


 


Basophils # 0.0   


 


Nucleated Red Blood Cells # 0.0   


 


Prothrombin Time 14.5  H 


 


Prothrombin Time Ratio 1.1   


 


INR International Normalized


Ratio 1.13  


  


 


 


Activated Partial


Thromboplast Time 33.0  


  


 


 


Sodium Level 152  H 


 


Potassium Level 3.5   


 


Chloride Level 109   


 


Carbon Dioxide Level 30   


 


Anion Gap 17  H 


 


Blood Urea Nitrogen 9   


 


Creatinine 0.76   


 


Glucose Level 89   


 


Calcium Level 8.6   


 


Troponin I < 0.012   < 0.012  


 


Creatine Kinase  59  


 


Creatine Kinase Index  2.1  


 


Creatinine Kinase MB (Mass)  1.23  











Medications


Medications





 Current Medications


Amiodarone HCl (Cordarone) 400 mg DAILY PO ;  Start 7/27/17 at 16:00


Apixaban (Eliquis) 5 mg BID PO ;  Start 7/27/17 at 21:00


Metoprolol Tartrate (Lopressor) 100 mg BID PO ;  Start 7/27/17 at 21:00


Pantoprazole (Protonix Tab) 40 mg DAILY@06 PO ;  Start 7/27/17 at 16:00


Sucralfate (Carafate) 1 gm BID PO ;  Start 7/27/17 at 21:00


Clonidine (Catapres) 0.1 mg Q4H  PRN PO ELEVATED BLOOD PRESSURE;  Start 7/27/17 

at 16:00


Zolpidem Tartrate (Ambien) 5 mg HS  PRN PO INSOMNIA;  Start 7/27/17 at 16:00


Lorazepam (Ativan) 0.5 mg Q4  PRN PO ANXIETY;  Start 7/27/17 at 16:00


Ondansetron HCl 4 mg 4 mg Q4H  PRN IV NAUSEA AND/OR VOMITING;  Start 7/27/17 at 

16:00


Potassium Chloride/Dextrose (D5W + KCl 20 Meq) 1,000 ml @  50 mls/hr Q20H IV ;  

Start 7/27/17 at 16:00


Morphine Sulfate (morphine) 4 mg Q4H  PRN IV PAIN LEVEL 7-10;  Start 7/27/17 at 

16:00





Procedures


Procedures


PE CT negative for PE


Rib xray negative for fxr





cxr reviewed no acute abnl











STEVAN DURANT Jul 27, 2017 16:51





STEVAN DURANT Jul 27, 2017 16:51

## 2017-07-27 NOTE — RADRPT
PROCEDURE:   XR ribs . 

 

CLINICAL INDICATION:   left rib pain 

 

TECHNIQUE:   AP and oblique views of the left ribs were obtained.

 

COMPARISON:   Chest x-ray 07/19/2017 

 

FINDINGS:  

There is no acute fracture or subluxation.  

Calcified pleural plaques in the left mid thorax and left lung base are stable.

There are degenerative changes of the visualized spine and left glenohumeral joint.

 

IMPRESSION:

1.  No acute fracture.

2.  Stable calcified pleural plaques in the left mid and lower thorax, suggestive of prior asbestos 
exposure.

 

RPTAT: PP

_____________________________________________ 

Physician Ellis           Date    Time 

Electronically viewed and signed by Physician Ellis on 07/27/2017 08:57 

 

D:  07/27/2017 08:57  T:  07/27/2017 08:57

/

## 2017-07-27 NOTE — RADRPT
PROCEDURE:   CTA Chest and pulmonary angiogram. 

 

CLINICAL INDICATION:   Chest pain and shortness of breath.  

 

TECHNIQUE:   CT scan of the chest and CT pulmonary angiogram was performed on a multidetector high-r
esolution CT scanner.  High-resolution thin slice coronal and sagittal imaging was obtained from the
 axial source images.  3-D volumetric rendered post processing was performed as well.  The patient w
as examined following the uncomplicated intravenous administration of 115 cc of Omnipaque-300 50. Th
e images were reviewed on a PACS workstation. The total exam CTDI equals 56.34, and 18.47 and the to
harpal exam DLP equals 742.93 mGy-cm.

 

One or more of the following dose reduction techniques were used:

Automated exposure control. 

Adjustment of the mA and/or kV according to patient size.

Use of iterative reconstruction technique.

 

COMPARISON:   CT pulmonary angio 11/15/2016 

 

FINDINGS:

 

CT chest:

 

There are bilateral calcified pleural plaques.  There is diffuse bronchial wall thickening more evid
ent in the lung bases.  Mild emphysema is seen in the upper lungs.  There is posterior dependent ate
lectasis. No focal opacification, effusion, pneumothorax, edema, or nodules are seen.  The central t
racheobronchial tree is clear.

 

The mediastinum is unremarkable without evidence for mass or lymphadenopathy.  The vascular structur
es of the mediastinum are normal in course and caliber.  There is multichamber cardiomegaly with no 
pericardial effusion.  Mitral annulus calcifications are present.  There are scattered aortic vascul
ar calcifications and dense coronary artery vascular calcifications.  The axillary, subpectoral, and
 supraclavicular regions are unremarkable.  

 

Imaging obtained through the upper abdomen is equally unremarkable.  The adrenal glands are symmetri
michael normal.  The surrounding chest wall is unremarkable.  The osseous structures are remarkable fo
r mild chronic compression fracture of T3 and T5, unchanged.



CT pulmonary angiogram:

 

No thrombus, clot, filling defect, or pulmonary web is identified.  The pulmonary arteries are enedina
l in caliber and morphology.  No filling defect is present to suggest pulmonary embolism.  There is 
no evidence for pulmonary arterial hypertension.  

 

IMPRESSION:

 

1.  No evidence for pulmonary embolism.

2.  Multichamber cardiomegaly particularly the left atrium is dilated.

3.  Bilateral calcified pleural plaques suggestive of asbestos exposure.  No evidence of interstitia
l lung disease.

4.  Dense coronary artery calcifications and scattered aortic vascular calcifications.

5.  No mass, lymphadenopathy or acute infiltrates.

6.  Mild emphysema.

7.  Mild diffuse bronchial wall thickening more evident in the lung bases in keeping with nonspecifi
c airway inflammation.

 

 

RPTAT: BB

_____________________________________________ 

.Leonardo Dejesus MD, MD           Date    Time 

Electronically viewed and signed by .Leonardo Dejesus MD, MD on 07/27/2017 10:21 

 

D:  07/27/2017 10:21  T:  07/27/2017 10:21

.O/

## 2017-07-27 NOTE — RADRPT
PROCEDURE:   XR Chest. 

 

CLINICAL INDICATION:    Chest Pain.

 

TECHNIQUE:   Single frontal view  of the chest was obtained 

 

COMPARISON:   Chest x-ray 07/19/2017 and CT chest 11/15/2016

 

FINDINGS:

The cardiac silhouette remains mildly enlarged.

There are atherosclerotic calcifications of the thoracic aorta.

Bilateral pleural calcifications are unchanged.

There is a new subtle patchy opacity at the left lung base which may represent atelectasis or consol
idation.

No pneumothorax or pleural effusion is seen.

There are degenerative changes of the visualized spine.

 

IMPRESSION:

1. Probable new subtle patchy opacity at the left lung base, compatible with atelectasis and / or ea
rly consolidation.

2.  Stable bilateral pleural calcifications, suggestive of prior asbestos exposure.

3.  Mild cardiomegaly.

4.  Thoracic aortic atherosclerotic disease.

 

RPTAT: PP

_____________________________________________ 

Physician Ellis           Date    Time 

Electronically viewed and signed by Physician Ellis on 07/27/2017 09:04 

 

D:  07/27/2017 09:04  T:  07/27/2017 09:04

/

## 2017-07-28 VITALS — RESPIRATION RATE: 18 BRPM | SYSTOLIC BLOOD PRESSURE: 140 MMHG | DIASTOLIC BLOOD PRESSURE: 80 MMHG

## 2017-07-28 VITALS — DIASTOLIC BLOOD PRESSURE: 80 MMHG | RESPIRATION RATE: 18 BRPM | SYSTOLIC BLOOD PRESSURE: 134 MMHG

## 2017-07-28 VITALS — HEART RATE: 89 BPM

## 2017-07-28 VITALS — RESPIRATION RATE: 18 BRPM | SYSTOLIC BLOOD PRESSURE: 139 MMHG | DIASTOLIC BLOOD PRESSURE: 72 MMHG

## 2017-07-28 VITALS — RESPIRATION RATE: 18 BRPM | SYSTOLIC BLOOD PRESSURE: 150 MMHG | DIASTOLIC BLOOD PRESSURE: 101 MMHG

## 2017-07-28 VITALS — HEART RATE: 102 BPM

## 2017-07-28 VITALS — SYSTOLIC BLOOD PRESSURE: 165 MMHG | DIASTOLIC BLOOD PRESSURE: 99 MMHG | RESPIRATION RATE: 18 BRPM

## 2017-07-28 VITALS — HEART RATE: 98 BPM

## 2017-07-28 VITALS — SYSTOLIC BLOOD PRESSURE: 162 MMHG | RESPIRATION RATE: 18 BRPM | DIASTOLIC BLOOD PRESSURE: 105 MMHG

## 2017-07-28 VITALS — HEART RATE: 78 BPM

## 2017-07-28 VITALS — HEART RATE: 84 BPM

## 2017-07-28 VITALS — HEART RATE: 81 BPM

## 2017-07-28 LAB
ABNORMAL IP MESSAGE: 1
ALBUMIN SERPL-MCNC: 4 G/DL (ref 3.3–4.9)
ALBUMIN/GLOB SERPL: 1.14 {RATIO}
ALP SERPL-CCNC: 87 IU/L (ref 42–121)
ALT SERPL-CCNC: 41 IU/L (ref 13–69)
ANION GAP SERPL CALC-SCNC: 17 MMOL/L (ref 8–16)
AST SERPL-CCNC: 41 IU/L (ref 15–46)
BASOPHILS # BLD AUTO: 0 10^3/UL (ref 0–0.1)
BASOPHILS NFR BLD: 0.4 % (ref 0–2)
BILIRUB DIRECT SERPL-MCNC: 0 MG/DL (ref 0–0.2)
BILIRUB SERPL-MCNC: 0.6 MG/DL (ref 0.2–1.3)
BUN SERPL-MCNC: 10 MG/DL (ref 7–20)
CALCIUM SERPL-MCNC: 8.6 MG/DL (ref 8.4–10.2)
CHLORIDE SERPL-SCNC: 103 MMOL/L (ref 97–110)
CO2 SERPL-SCNC: 30 MMOL/L (ref 21–31)
CREAT SERPL-MCNC: 0.69 MG/DL (ref 0.61–1.24)
EOSINOPHIL # BLD: 0 10^3/UL (ref 0–0.5)
EOSINOPHIL NFR BLD: 0.2 % (ref 0–7)
ERYTHROCYTE [DISTWIDTH] IN BLOOD BY AUTOMATED COUNT: 16.2 % (ref 11.5–14.5)
GLOBULIN SER-MCNC: 3.5 G/DL (ref 1.3–3.2)
GLUCOSE SERPL-MCNC: 87 MG/DL (ref 70–220)
HCT VFR BLD CALC: 48.5 % (ref 42–52)
HGB BLD-MCNC: 15.5 G/DL (ref 14–18)
LYMPHOCYTES # BLD AUTO: 0.4 10^3/UL (ref 0.8–2.9)
LYMPHOCYTES NFR BLD AUTO: 8.2 % (ref 15–51)
MAGNESIUM SERPL-MCNC: 2.1 MG/DL (ref 1.7–2.5)
MCH RBC QN AUTO: 30.6 PG (ref 29–33)
MCHC RBC AUTO-ENTMCNC: 32 G/DL (ref 32–37)
MCV RBC AUTO: 95.8 FL (ref 82–101)
MONOCYTES # BLD: 0.6 10^3/UL (ref 0.3–0.9)
MONOCYTES NFR BLD: 12.9 % (ref 0–11)
NEUTROPHILS # BLD: 3.6 10^3/UL (ref 1.6–7.5)
NEUTROPHILS NFR BLD AUTO: 78.1 % (ref 39–77)
NRBC # BLD MANUAL: 0 10^3/UL (ref 0–0)
NRBC BLD AUTO-RTO: 0 /100WBC (ref 0–0)
PLATELET # BLD: 149 10^3/UL (ref 140–415)
PMV BLD AUTO: 11.6 FL (ref 7.4–10.4)
POSITIVE DIFF: (no result)
POTASSIUM SERPL-SCNC: 4.6 MMOL/L (ref 3.5–5.1)
PROT SERPL-MCNC: 7.5 G/DL (ref 6.1–8.1)
RBC # BLD AUTO: 5.06 10^6/UL (ref 4.7–6.1)
SODIUM SERPL-SCNC: 145 MMOL/L (ref 135–144)
TROPONIN I SERPL-MCNC: < 0.012 NG/ML (ref 0–0.12)
WBC # BLD AUTO: 4.7 10^3/UL (ref 4.8–10.8)

## 2017-07-28 RX ADMIN — APIXABAN SCH MG: 5 TABLET, FILM COATED ORAL at 21:06

## 2017-07-28 RX ADMIN — METOPROLOL TARTRATE SCH MG: 100 TABLET, FILM COATED ORAL at 08:56

## 2017-07-28 RX ADMIN — APIXABAN SCH MG: 5 TABLET, FILM COATED ORAL at 08:57

## 2017-07-28 RX ADMIN — PANTOPRAZOLE SODIUM SCH MG: 40 TABLET, DELAYED RELEASE ORAL at 05:01

## 2017-07-28 RX ADMIN — SUCRALFATE SCH GM: 1 TABLET ORAL at 08:57

## 2017-07-28 RX ADMIN — SUCRALFATE SCH GM: 1 TABLET ORAL at 21:05

## 2017-07-28 RX ADMIN — METOPROLOL TARTRATE SCH MG: 100 TABLET, FILM COATED ORAL at 21:06

## 2017-07-28 RX ADMIN — POTASSIUM PHOSPHATE, MONOBASIC AND POTASSIUM PHOSPHATE, DIBASIC SCH MLS/HR: 224; 236 INJECTION, SOLUTION, CONCENTRATE INTRAVENOUS at 14:04

## 2017-07-28 NOTE — CONS
Date/Time of Note


Date/Time of Note


DATE: 7/28/17 


TIME: 16:46





Assessment/Plan


Assessment/Plan


Chief Complaint/Hosp Course


 76 y.o. man with a history of CAD s/p PCI to LAD 11/2016, RCA 2010, chronic 

afib on warfarin, aortic stenosis,  hypertension, and hypertrophic obstructive 

cardiomyopathy who presented with chest pain.  No evidence of ACS.  Chest pain 

was likely musculoskeletal.  Now pain free after toradol.  Continue cardiac 

meds.  Ok to d/c home.


Problems:  





Consultation Date/Type/Reason


Admit Date/Time


Jul 27, 2017 at 10:44


Initial Consult Date


7/27/17


Type of Consultation:  Cardiology


Reason for Consultation


chest pain


Referring Provider:  ASAD VAUGHN MD





24 HR Interval Summary


Free Text/Dictation


Feels good.  Chest pain free after toradol.  Ate lunch w/o issues.





Exam/Review of Systems


Vital Signs


Vitals





 Vital Signs








  Date Time  Temp Pulse Resp B/P Pulse Ox O2 Delivery O2 Flow Rate FiO2


 


7/28/17 16:00  78      


 


7/28/17 15:59 98.1  18 134/80 93   


 


7/27/17 15:24      Room Air  


 


7/27/17 08:11       0 














 Intake and Output   


 


 7/27/17 7/27/17 7/28/17





 15:00 23:00 07:00


 


Intake Total  240 ml 


 


Balance  240 ml 











Exam


Constitutional:  alert, 


   No distress


Neck:  No jvd


Respiratory:  clear to auscultation


Cardiovascular:  other (2/6 SKYLAR), regular rate and rhythm


Musculoskeletal:  other (no pain w/ palpation over the chest )


Extremities:  No edema





Results


Result Diagram:  


7/28/17 0715                                                                   

             7/28/17 0715





Results 24 hrs





Laboratory Tests








Test


  7/27/17


19:47 7/28/17


07:15


 


Creatine Kinase 45   


 


Creatine Kinase Index 2.6   


 


Creatinine Kinase MB (Mass) 1.15   


 


Troponin I < 0.012   < 0.012  


 


White Blood Count  4.7  #L


 


Red Blood Count  5.06  


 


Hemoglobin  15.5  


 


Hematocrit  48.5  


 


Mean Corpuscular Volume  95.8  


 


Mean Corpuscular Hemoglobin  30.6  


 


Mean Corpuscular Hemoglobin


Concent 


  32.0  


 


 


Red Cell Distribution Width  16.2  H


 


Platelet Count  149  


 


Mean Platelet Volume  11.6  H


 


Neutrophils %  78.1  H


 


Lymphocytes %  8.2  L


 


Monocytes %  12.9  H


 


Eosinophils %  0.2  


 


Basophils %  0.4  


 


Nucleated Red Blood Cells %  0.0  


 


Neutrophils #  3.6  


 


Lymphocytes #  0.4  L


 


Monocytes #  0.6  


 


Eosinophils #  0.0  


 


Basophils #  0.0  


 


Nucleated Red Blood Cells #  0.0  


 


Sodium Level  145  H


 


Potassium Level  4.6  


 


Chloride Level  103  


 


Carbon Dioxide Level  30  


 


Anion Gap  17  H


 


Blood Urea Nitrogen  10  


 


Creatinine  0.69  


 


Glucose Level  87  


 


Calcium Level  8.6  


 


Magnesium Level  2.1  


 


Total Bilirubin  0.6  


 


Direct Bilirubin  0.00  


 


Indirect Bilirubin  0.6  


 


Aspartate Amino Transf


(AST/SGOT) 


  41  


 


 


Alanine Aminotransferase


(ALT/SGPT) 


  41  


 


 


Alkaline Phosphatase  87  


 


Total Protein  7.5  


 


Albumin  4.0  


 


Globulin  3.50  H


 


Albumin/Globulin Ratio  1.14  











Medications


Medications





 Current Medications


Apixaban (Eliquis) 5 mg BID PO  Last administered on 7/28/17at 08:57; Admin 

Dose 5 MG;  Start 7/27/17 at 21:00


Metoprolol Tartrate (Lopressor) 100 mg BID PO  Last administered on 7/28/17at 08

:56; Admin Dose 100 MG;  Start 7/27/17 at 21:00


Pantoprazole (Protonix Tab) 40 mg DAILY@06 PO  Last administered on 7/28/17at 05

:01; Admin Dose 40 MG;  Start 7/27/17 at 16:00


Sucralfate (Carafate) 1 gm BID PO  Last administered on 7/28/17at 08:57; Admin 

Dose 1 GM;  Start 7/27/17 at 21:00


Clonidine (Catapres) 0.1 mg Q4H  PRN PO ELEVATED BLOOD PRESSURE Last 

administered on 7/27/17at 17:03; Admin Dose 0.1 MG;  Start 7/27/17 at 16:00


Zolpidem Tartrate (Ambien) 5 mg HS  PRN PO INSOMNIA;  Start 7/27/17 at 16:00


Lorazepam (Ativan) 0.5 mg Q4  PRN PO ANXIETY Last administered on 7/27/17at 17:

02; Admin Dose 0.5 MG;  Start 7/27/17 at 16:00


Ondansetron HCl 4 mg 4 mg Q4H  PRN IV NAUSEA AND/OR VOMITING Last administered 

on 7/27/17at 21:00; Admin Dose 4 MG;  Start 7/27/17 at 16:00


Potassium Chloride/Dextrose (D5W + KCl 20 Meq) 1,000 ml @  50 mls/hr Q20H IV  

Last administered on 7/28/17at 14:04; Admin Dose 50 MLS/HR;  Start 7/27/17 at 16

:00


Morphine Sulfate (morphine) 4 mg Q4H  PRN IV PAIN LEVEL 7-10 Last administered 

on 7/28/17at 09:13; Admin Dose 4 MG;  Start 7/27/17 at 16:00











NALDO MARTIN Jul 28, 2017 16:48

## 2017-07-28 NOTE — RADRPT
PROCEDURE:   Xray left ribs. 

 

CLINICAL INDICATION:   Left rib pain. 

 

TECHNIQUE:   Three views of the left ribs.

 

COMPARISON:   07/27/2017. 

 

FINDINGS:

The left ribs are normal with no fracture or lytic lesion.  There are left-sided calcified pleural p
laques consistent with prior asbestos exposure. There is calcification in the aorta consistent with 
atherosclerosis. 

 

IMPRESSION:

1.  Atherosclerosis.

2.  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

3.  Otherwise unremarkable left ribs x-ray series.  

RPTAT: QQ

_____________________________________________ 

.Bryan Yoo MD, MD           Date    Time 

Electronically viewed and signed by .Bryan Yoo MD, MD on 07/28/2017 15:25 

 

D:  07/28/2017 15:25  T:  07/28/2017 15:25

.R/

## 2017-07-28 NOTE — HP
DATE OF ADMISSION:  07/27/2017

 

 

 

HISTORY OF PRESENT ILLNESS:  The patient is a 76-year-old

gentleman who presents with severe left-sided chest pain which is

very sharp with, increased with any movement.  The patient

apparently was watching TV and he says he fell asleep and fell

out of a chair, but does not recall the event.  Not clear if he

hit his chest.  He was discharged only a week back off after

admission for epigastric and chest pain.  Had an EGD showing an

esophageal ring and gastritis.

 

 

 

MEDICATIONS:  His medications include Eliquis 5 mg b.i.d.,

amiodarone 4 mg q. daily, aspirin 81 mg 2 daily, Carafate 1 g

b.i.d., pantoprazole 40 mg q. day, metoprolol tartrate 100 mg

b.i.d., simvastatin 40 mg p.o. q. daily.

 

 

 

ALLERGIES:  NO KNOWN ALLERGIES.

 

 

 

REVIEW OF SYSTEMS:

 

HEENT:  Head, no headache, focal weakness or numbness.  Prior

history of stroke.  Eyes, no blurry vision or glaucoma.  ENT

noncontributory.

 

NECK:  No history of thyroid disease.

 

CHEST:  Chest bronchitis or asthma.  The patient does not smoke.

 

HEART:  Status post Lexiscan a few months prior showed no

evidence of perfusion defect.  History of hypertrophic

obstructive cardiomyopathy with paroxysmal AFib.  Patient is

status post MI in November of last year with AFib.  Status post

PTCA of the proximal LAD with a drug-eluting stent.

 

GASTROINTESTINAL:  History of GERD and esophagitis.  No history

of gall stones.

 

GENITOURINARY:  No dysuria, hematuria, or kidney stones.

 

 

 

PHYSICAL EXAMINATION:  The patient is an average built male

 

GENERAL APPEARANCE:  Who is very pleasant, in no acute distress.

 

VITAL SIGNS:  Blood pressure 165/99, afebrile.

 

HEENT:  Head normocephalic.  No pallor, cyanosis, or icterus.

Tongue is moist.

 

NECK:  Supple.  No thyromegaly, bruits, or lymphadenopathy.

 

CHEST:  Decreased breath sounds at bases.

 

HEART:  S1, S2.  No rubs or gallops.

 

ABDOMEN:  Soft, obese, and nontender.  No hepatosplenomegaly.

Moderate tenderness on palpation of the left chest wall.  No rash

noted.

 

EXTREMITIES:  No edema.  Pedal pulses 1+ bilaterally.  Homans

negative.

 

NEUROLOGIC:  No localizing or lateralizing signs.

 

 

 

LABORATORY DATA:  Initial WBC count 3.2, hematocrit 47.1,

platelet 160,000.  Sodium 152, potassium 3.5, BUN 9, creatinine

0.76.  Troponin type was negative.

 

 

 

IMPRESSION:

 

1.   Atypical chest pain likely musculoskeletal.  CT is negative

  PE.  Suspicion of rib factor still high.

2.   Hypertrophic obstructive cardiomyopathy.

3.   Paroxysmal atrial fibrillation.

4.   GERD.

5.   Coronary artery disease.

 

 

PLAN:  The patient will be admitted to telemetry.  Obtain cardiac

consultation Dr. Lemus.  Start the patient on Toradol.  Repeat

rib x-rays.

 

 

 

 

 

 

 

Dictated By:  Kike Mata MD

 

 

 

/jorge luis/

 

Job#:  [id number]/Document#:  [id number]

 

D:  07/28/2017 09:22

 

T:  07/28/2017 11:53

## 2017-07-29 VITALS — SYSTOLIC BLOOD PRESSURE: 140 MMHG | DIASTOLIC BLOOD PRESSURE: 83 MMHG | RESPIRATION RATE: 17 BRPM

## 2017-07-29 VITALS — RESPIRATION RATE: 18 BRPM | DIASTOLIC BLOOD PRESSURE: 85 MMHG | SYSTOLIC BLOOD PRESSURE: 137 MMHG

## 2017-07-29 VITALS — DIASTOLIC BLOOD PRESSURE: 98 MMHG | SYSTOLIC BLOOD PRESSURE: 154 MMHG | RESPIRATION RATE: 17 BRPM

## 2017-07-29 VITALS — DIASTOLIC BLOOD PRESSURE: 84 MMHG | SYSTOLIC BLOOD PRESSURE: 157 MMHG | RESPIRATION RATE: 17 BRPM

## 2017-07-29 VITALS — RESPIRATION RATE: 18 BRPM | DIASTOLIC BLOOD PRESSURE: 93 MMHG | SYSTOLIC BLOOD PRESSURE: 156 MMHG

## 2017-07-29 VITALS — HEART RATE: 94 BPM

## 2017-07-29 VITALS — HEART RATE: 79 BPM

## 2017-07-29 VITALS — HEART RATE: 85 BPM

## 2017-07-29 VITALS — HEART RATE: 90 BPM

## 2017-07-29 VITALS — HEART RATE: 80 BPM

## 2017-07-29 RX ADMIN — APIXABAN SCH MG: 5 TABLET, FILM COATED ORAL at 08:45

## 2017-07-29 RX ADMIN — SUCRALFATE SCH GM: 1 TABLET ORAL at 08:45

## 2017-07-29 RX ADMIN — POTASSIUM PHOSPHATE, MONOBASIC AND POTASSIUM PHOSPHATE, DIBASIC SCH MLS/HR: 224; 236 INJECTION, SOLUTION, CONCENTRATE INTRAVENOUS at 08:44

## 2017-07-29 RX ADMIN — METOPROLOL TARTRATE SCH MG: 100 TABLET, FILM COATED ORAL at 08:45

## 2017-07-29 RX ADMIN — PANTOPRAZOLE SODIUM SCH MG: 40 TABLET, DELAYED RELEASE ORAL at 05:07

## 2017-07-30 NOTE — DS
DATE OF ADMISSION:  07/27/2017

 

DATE OF DISCHARGE:  07/29/2017

 

 

 

FINAL DIAGNOSES:

 

1.   Atypical chest pain, musculoskeletal with costochondritis.

  No evidence of rib fracture.

2.   Calcified pleural plaques suggestive of asbestos exposure.

The patient denies any exposure to asbestos.

3.   Hypertrophic obstructive cardiomyopathy.

4.   Paroxysmal atrial fibrillation.

5.   Gastroesophageal reflux disease.

6.   Coronary artery disease.

 

 

HISTORY:  The patient is a 76-year-old gentleman presenting with

severe left-sided chest pain which was very sharp and increased

with any kind of movement.  The patient apparently fell asleep

while watching TV, fell out of chair, but does not recall the

event.  Not clear if he hit his chest.  The patient was found to

be in no acute distress.  Had significant tenderness on palpation

of left chest wall.  There was no suggestion of herpes zoster.

Rib series x2 was negative.  CT scan of the chest was performed

which showed no evidence of pulmonary embolism, dilatation of

left atrium, bilateral calcified plaques suggestive of asbestos

exposure.  No evidence of interstitial lung disease.  There is

mild diffuse bronchial thickening evident at the lung basis, some

mild emphysema was evident as well.  No mass or lymphadenopathy

or acute infiltrates.  The patient was monitored and started on

Toradol with improvement.

 

 

 

DISCHARGE CONDITION:  The patient was discharged home in much

improved condition, to follow all his prior medications.

 

 

 

DISCHARGE MEDICATIONS:

 

1.   Eliquis 5 mg b.i.d.

2.   Amiodarone 400 mg q. day.

3.   Aspirin 81 mg p.o. daily.

4.   Carafate 1 gram b.i.d.

5.   Pantoprazole 40 mg p.o. daily.

6.   Metoprolol tartrate 100 mg b.i.d.

 

 

7.   Simvastatin 40 mg p.o. q. day.

 

 

DISPOSITION:  The patient will be followed in my office in 1-2

weeks.

 

 

 

 

 

 

 

Dictated By:  Kike Mata MD

 

 

 

/jorge luis/vandana

 

Job#:  [id number]/Document#:  [id number]

 

D:  07/29/2017 15:46

 

T:  07/30/2017 06:09

## 2017-08-17 ENCOUNTER — HOSPITAL ENCOUNTER (INPATIENT)
Dept: HOSPITAL 10 - E/R | Age: 77
LOS: 2 days | Discharge: HOME | DRG: 313 | End: 2017-08-19
Attending: INTERNAL MEDICINE | Admitting: INTERNAL MEDICINE
Payer: MEDICARE

## 2017-08-17 VITALS
HEIGHT: 70 IN | HEIGHT: 70 IN | BODY MASS INDEX: 29.04 KG/M2 | WEIGHT: 202.83 LBS | WEIGHT: 202.83 LBS | BODY MASS INDEX: 29.04 KG/M2

## 2017-08-17 VITALS — TEMPERATURE: 98.1 F

## 2017-08-17 VITALS — HEART RATE: 92 BPM

## 2017-08-17 DIAGNOSIS — R11.2: ICD-10-CM

## 2017-08-17 DIAGNOSIS — R07.89: Primary | ICD-10-CM

## 2017-08-17 DIAGNOSIS — D72.819: ICD-10-CM

## 2017-08-17 DIAGNOSIS — Z95.5: ICD-10-CM

## 2017-08-17 DIAGNOSIS — R06.02: ICD-10-CM

## 2017-08-17 DIAGNOSIS — Z79.02: ICD-10-CM

## 2017-08-17 DIAGNOSIS — K76.0: ICD-10-CM

## 2017-08-17 DIAGNOSIS — K57.90: ICD-10-CM

## 2017-08-17 DIAGNOSIS — K21.9: ICD-10-CM

## 2017-08-17 DIAGNOSIS — I48.91: ICD-10-CM

## 2017-08-17 DIAGNOSIS — I42.1: ICD-10-CM

## 2017-08-17 DIAGNOSIS — Z79.82: ICD-10-CM

## 2017-08-17 DIAGNOSIS — K80.80: ICD-10-CM

## 2017-08-17 DIAGNOSIS — R10.13: ICD-10-CM

## 2017-08-17 LAB
ABNORMAL IP MESSAGE: 1
ADD UMIC: NO
ALBUMIN SERPL-MCNC: 4.3 G/DL (ref 3.3–4.9)
ALP SERPL-CCNC: 108 IU/L (ref 42–121)
ALT SERPL-CCNC: 40 IU/L (ref 13–69)
ANION GAP SERPL CALC-SCNC: 16 MMOL/L (ref 8–16)
APTT BLD: 30.4 SEC (ref 25–35)
AST SERPL-CCNC: 49 IU/L (ref 15–46)
BASOPHILS # BLD AUTO: 0 10^3/UL (ref 0–0.1)
BASOPHILS NFR BLD: 0.6 % (ref 0–2)
BILIRUB DIRECT SERPL-MCNC: 0 MG/DL (ref 0–0.2)
BILIRUB SERPL-MCNC: 0.9 MG/DL (ref 0.2–1.3)
BUN SERPL-MCNC: 12 MG/DL (ref 7–20)
CALCIUM SERPL-MCNC: 9.2 MG/DL (ref 8.4–10.2)
CHLORIDE SERPL-SCNC: 102 MMOL/L (ref 97–110)
CK MB SERPL-MCNC: 1.3 NG/ML (ref 0–2.4)
CK SERPL-CCNC: 35 IU/L (ref 23–200)
CO2 SERPL-SCNC: 30 MMOL/L (ref 21–31)
COLOR UR: YELLOW
CREAT SERPL-MCNC: 0.77 MG/DL (ref 0.61–1.24)
EOSINOPHIL # BLD: 0 10^3/UL (ref 0–0.5)
EOSINOPHIL NFR BLD: 0.2 % (ref 0–7)
ERYTHROCYTE [DISTWIDTH] IN BLOOD BY AUTOMATED COUNT: 14.5 % (ref 11.5–14.5)
GLUCOSE SERPL-MCNC: 141 MG/DL (ref 70–220)
GLUCOSE UR STRIP-MCNC: NEGATIVE MG/DL
HCT VFR BLD CALC: 48.5 % (ref 42–52)
HGB BLD-MCNC: 16.2 G/DL (ref 14–18)
INR PPP: 1.14
KETONES UR STRIP.AUTO-MCNC: (no result) MG/DL
LYMPHOCYTES # BLD AUTO: 0.4 10^3/UL (ref 0.8–2.9)
LYMPHOCYTES NFR BLD AUTO: 7.3 % (ref 15–51)
MCH RBC QN AUTO: 31 PG (ref 29–33)
MCHC RBC AUTO-ENTMCNC: 33.4 G/DL (ref 32–37)
MCV RBC AUTO: 92.7 FL (ref 82–101)
MONOCYTES # BLD: 0.3 10^3/UL (ref 0.3–0.9)
MONOCYTES NFR BLD: 5.2 % (ref 0–11)
NEUTROPHILS NFR BLD AUTO: 86.3 % (ref 39–77)
NITRITE UR QL STRIP.AUTO: NEGATIVE MG/DL
NRBC # BLD MANUAL: 0 10^3/UL (ref 0–0)
NRBC BLD AUTO-RTO: 0 /100WBC (ref 0–0)
PLATELET # BLD: 157 10^3/UL (ref 140–415)
PMV BLD AUTO: 10.3 FL (ref 7.4–10.4)
POSITIVE DIFF: (no result)
POTASSIUM SERPL-SCNC: 3.8 MMOL/L (ref 3.5–5.1)
PROT SERPL-MCNC: 8.2 G/DL (ref 6.1–8.1)
PROTHROMBIN TIME: 14.7 SEC (ref 12.2–14.2)
PT RATIO: 1.1
RBC # BLD AUTO: 5.23 10^6/UL (ref 4.7–6.1)
RBC # UR AUTO: NEGATIVE MG/DL
SODIUM SERPL-SCNC: 144 MMOL/L (ref 135–144)
TROPONIN I SERPL-MCNC: < 0.012 NG/ML (ref 0–0.12)
TROPONIN I SERPL-MCNC: < 0.012 NG/ML (ref 0–0.12)
UR ASCORBIC ACID: NEGATIVE MG/DL
UR BILIRUBIN (DIP): NEGATIVE MG/DL
UR CLARITY: (no result)
UR PH (DIP): 8 (ref 5–9)
UR RBC: 0 /HPF (ref 0–5)
UR SPECIFIC GRAVITY (DIP): 1.01 (ref 1–1.03)
UR TOTAL PROTEIN (DIP): NEGATIVE MG/DL
UROBILINOGEN UR STRIP-ACNC: NEGATIVE MG/DL
WBC # BLD AUTO: 4.8 10^3/UL (ref 4.8–10.8)
WBC # UR STRIP: NEGATIVE LEU/UL

## 2017-08-17 PROCEDURE — 81003 URINALYSIS AUTO W/O SCOPE: CPT

## 2017-08-17 PROCEDURE — 36415 COLL VENOUS BLD VENIPUNCTURE: CPT

## 2017-08-17 PROCEDURE — 84484 ASSAY OF TROPONIN QUANT: CPT

## 2017-08-17 PROCEDURE — 85025 COMPLETE CBC W/AUTO DIFF WBC: CPT

## 2017-08-17 PROCEDURE — 87081 CULTURE SCREEN ONLY: CPT

## 2017-08-17 PROCEDURE — 82550 ASSAY OF CK (CPK): CPT

## 2017-08-17 PROCEDURE — 71010: CPT

## 2017-08-17 PROCEDURE — 81001 URINALYSIS AUTO W/SCOPE: CPT

## 2017-08-17 PROCEDURE — 74177 CT ABD & PELVIS W/CONTRAST: CPT

## 2017-08-17 PROCEDURE — 96376 TX/PRO/DX INJ SAME DRUG ADON: CPT

## 2017-08-17 PROCEDURE — 85730 THROMBOPLASTIN TIME PARTIAL: CPT

## 2017-08-17 PROCEDURE — 96374 THER/PROPH/DIAG INJ IV PUSH: CPT

## 2017-08-17 PROCEDURE — 82553 CREATINE MB FRACTION: CPT

## 2017-08-17 PROCEDURE — 85610 PROTHROMBIN TIME: CPT

## 2017-08-17 PROCEDURE — 93005 ELECTROCARDIOGRAM TRACING: CPT

## 2017-08-17 PROCEDURE — 83735 ASSAY OF MAGNESIUM: CPT

## 2017-08-17 PROCEDURE — 80076 HEPATIC FUNCTION PANEL: CPT

## 2017-08-17 PROCEDURE — 80048 BASIC METABOLIC PNL TOTAL CA: CPT

## 2017-08-17 PROCEDURE — 96375 TX/PRO/DX INJ NEW DRUG ADDON: CPT

## 2017-08-17 RX ADMIN — APIXABAN SCH MG: 5 TABLET, FILM COATED ORAL at 22:51

## 2017-08-17 RX ADMIN — METOPROLOL TARTRATE SCH MG: 100 TABLET, FILM COATED ORAL at 22:51

## 2017-08-17 RX ADMIN — ATORVASTATIN CALCIUM SCH MG: 20 TABLET, FILM COATED ORAL at 22:51

## 2017-08-17 NOTE — ERA
ER Documentation


Chief Complaint


Date/Time


DATE: 8/17/17 


TIME: 19:48


Chief Complaint


CP,COUGH





HPI


This is a 76-year-old male who presents to the emergency room for evaluation of 

chest pain, shortness of breath and a cough.  The patient states he has had 

chest pain for the past 2 days which is been intermittent left-sided and 

described as a pressure-like sensation with no radiation.  The patient states 

he has associated nausea and is vomited 2 times.  The patient was recently seen 

in the emergency room for the same





ROS


All systems reviewed and are negative except as per history of present illness.





Medications


Home Meds


Active Scripts


Apixaban* (Eliquis*) 5 Mg Tablet, 5 MG PO BID for 30 Days, #60 TAB


   Prov:ASAD VAUGHN MD         7/22/17


Simethicone* (Mylicon*) 80 Mg Tab, 80 MG GTB QID Y for DISTENSION/GAS/BLOATING 

for 30 Days, #120 TAB


   Prov:ASAD VAUGHN MD         7/22/17


Aspirin* (Aspirin* EC) 81 Mg Tablet.dr, 81 MG PO DAILY for 30 Days, #30


   Prov:ASAD VAUGHN MD         6/3/17


Reported Medications


Sucralfate* (Carafate*) 1 Gm Tab, 1 GM PO BID, TAB


   7/19/17


Pantoprazole* (Protonix*) 40 Mg Tablet.dr, 40 MG PO DAILY, TAB


   7/19/17


Metoprolol Tartrate* (Lopressor*) 100 Mg Tablet, 100 MG PO BID, #60 TAB


   7/19/17


Simvastatin* (Zocor*) 40 Mg Tablet, 40 MG PO QHS, #30 TAB


   4/5/17


Discontinued Scripts


Amiodarone Hcl* (Amiodarone Hcl*) 400 Mg Tablet, 400 MG PO DAILY for 30 Days, #

30 TAB


   Prov:ASAD VAUGHN MD         7/22/17





Allergies


Allergies:  


Coded Allergies:  


     No Known Allergies (Verified  Allergy, Unknown, 8/17/17)





PMhx/Soc


History of Surgery:  No


Anesthesia Reaction:  No


Hx Neurological Disorder:  No


Hx Respiratory Disorders:  No


Hx Cardiac Disorders:  No


Hx Psychiatric Problems:  No


Hx Miscellaneous Medical Probl:  Yes (Chest pain)


Hx Alcohol Use:  No


Hx Substance Use:  No


Hx Tobacco Use:  No


Smoking Status:  Never smoker





Physical Exam


Vitals





Vital Signs








  Date Time  Temp Pulse Resp B/P Pulse Ox O2 Delivery O2 Flow Rate FiO2


 


8/17/17 18:30 98.1 93 20 160/87 100   


 


8/17/17 16:48      Nasal Cannula 2 


 


8/17/17 16:10 98.1 110 20 135/95 99   








Physical Exam


INITIAL VITAL SIGNS: Reviewed by me


GENERAL:  The patient is well developed and appropriate for usual state of 

health in no apparent distress


HEENT: Pupils equal, round, and reactive to light.  EOMI. There is no scleral 

icterus.


NECK:  C-spine is soft and supple, there is no meningismus.  There is no 

cervical lymphadenopathy.


LUNGS:  Clear to auscultation bilaterally. There are no rales, wheezes or 

rhonchi.


HEART: Irregularly irregular rhythm, no murmurs, clicks, rubs or gallops.


ABDOMEN:  Soft, non-tender, non-distended.  There are bowel sounds in all four 

quadrants. No rebound or guarding.


EXTREMITIES:  There is no peripheral cyanosis or edema.  No focal swelling or 

erythema.


NEUROLOGICAL:  The patient moves all four extremities with 5/5 strength.  

Cranial nerves II - XII are intact. Normal gait. Alert and oriented


SKIN:  There is no apparent rash or petechiae.


HEME/LYMPHATIC:  There is no evidence of excessive bruising or lymphedema.


PSYCHIATRIC:  The patient does not appear anxious or depressed.


Result Diagram:  


8/17/17 1619                                                                   

             8/17/17 1620





Results 24 hrs





 Laboratory Tests








Test


  8/17/17


16:19 8/17/17


16:20 8/17/17


19:25


 


White Blood Count 4.810^3/ul   


 


Red Blood Count 5.2310^6/ul   


 


Hemoglobin 16.2g/dl   


 


Hematocrit 48.5%   


 


Mean Corpuscular Volume 92.7fl   


 


Mean Corpuscular Hemoglobin 31.0pg   


 


Mean Corpuscular Hemoglobin


Concent 33.4g/dl 


  


  


 


 


Red Cell Distribution Width 14.5%   


 


Platelet Count 88977^3/UL   


 


Mean Platelet Volume 10.3fl   


 


Neutrophils % 86.3%   


 


Lymphocytes % 7.3%   


 


Monocytes % 5.2%   


 


Eosinophils % 0.2%   


 


Basophils % 0.6%   


 


Nucleated Red Blood Cells % 0.0/100WBC   


 


Neutrophils # (Manual) 410^3/ul   


 


Lymphocytes # 0.410^3/ul   


 


Monocytes # 0.310^3/ul   


 


Eosinophils # 0.010^3/ul   


 


Basophils # 0.010^3/ul   


 


Nucleated Red Blood Cells # 0.010^3/ul   


 


Prothrombin Time  14.7Sec  


 


Prothrombin Time Ratio  1.1  


 


INR International Normalized


Ratio 


  1.14 


  


 


 


Activated Partial


Thromboplast Time 


  30.4Sec 


  


 


 


Sodium Level  144mmol/L  


 


Potassium Level  3.8mmol/L  


 


Chloride Level  102mmol/L  


 


Carbon Dioxide Level  30mmol/L  


 


Anion Gap  16  


 


Blood Urea Nitrogen  12mg/dl  


 


Creatinine  0.77mg/dl  


 


Glucose Level  141mg/dl  


 


Calcium Level  9.2mg/dl  


 


Total Bilirubin  0.9mg/dl  


 


Direct Bilirubin  0.00mg/dl  


 


Indirect Bilirubin  0.9mg/dl  


 


Aspartate Amino Transf


(AST/SGOT) 


  49IU/L 


  


 


 


Alanine Aminotransferase


(ALT/SGPT) 


  40IU/L 


  


 


 


Alkaline Phosphatase  108IU/L  


 


Troponin I  < 0.012ng/ml  


 


Total Protein  8.2g/dl  


 


Albumin  4.3g/dl  


 


Urine Color   YELLOW 


 


Urine Clarity


  


  


  SLIGHTLY


CLOUDY


 


Urine pH   8.0 


 


Urine Specific Gravity   1.013 


 


Urine Ketones   TRACEmg/dL 


 


Urine Nitrite   NEGATIVEmg/dL 


 


Urine Bilirubin   NEGATIVEmg/dL 


 


Urine Urobilinogen   NEGATIVEmg/dL 


 


Urine Leukocyte Esterase   NEGATIVELeu/ul 


 


Urine Microscopic RBC   0/HPF 


 


Urine Microscopic WBC   0/HPF 


 


Urine Hemoglobin   NEGATIVEmg/dL 


 


Urine Glucose   NEGATIVEmg/dL 


 


Urine Total Protein   NEGATIVEmg/dl 








 Current Medications








 Medications


  (Trade)  Dose


 Ordered  Sig/Graciela


 Route


 PRN Reason  Start Time


 Stop Time Status Last Admin


Dose Admin


 


 Sodium Chloride


  (NS)  500 ml @ 


 500 mls/hr  Q1H STAT


 IV


   8/17/17 16:19


 8/17/17 17:18 DC 8/17/17 16:47


 


 


 Diltiazem HCl


  (Cardizem Iv)  10 mg  ONCE  ONCE


 IV


   8/17/17 16:30


 8/17/17 16:31 DC 8/17/17 16:42


 


 


 Diltiazem HCl


  (Cardizem Iv)  10 mg  ONCE  ONCE


 IV


   8/17/17 17:00


 8/17/17 17:01 DC 8/17/17 16:45


 


 


 Morphine Sulfate


  (morphine)  4 mg  ONCE  STAT


 IV


   8/17/17 16:52


 8/17/17 16:53 DC 8/17/17 16:57


 


 


 Ondansetron HCl


  (Zofran Inj)  4 mg  ONCE  STAT


 IV


   8/17/17 16:52


 8/17/17 16:53 DC 8/17/17 16:57


 


 


 Famotidine


  (Pepcid Iv)  20 mg  ONCE  STAT


 IV


   8/17/17 17:20


 8/17/17 17:23 DC 8/17/17 17:41


 


 


 Miscellaneous


 Medication


  (Gi Cocktail (2))  40 ml  ONCE  STAT


 PO


   8/17/17 17:20


 8/17/17 17:23 DC 8/17/17 17:41


 


 


 IV Flush 10 ml  10 ml  STK-MED ONCE


 .ROUTE


   8/17/17 18:15


 8/17/17 18:16 DC 8/17/17 18:30


 


 


 Sodium Chloride


  (NS)  100 ml @ ud  STK-MED ONCE


 .ROUTE


   8/17/17 18:15


 8/17/17 18:16 DC 8/17/17 18:30


 


 


 Iohexol


  (Omnipaque 300mg/


 ml)  150 ml  STK-MED ONCE


 .ROUTE


   8/17/17 18:15


 8/17/17 18:16 DC 8/17/17 18:30


 


 


 Ondansetron HCl


  (Zofran Inj)  4 mg  ONCE  STAT


 IV


   8/17/17 18:43


 8/17/17 18:44 DC 8/17/17 18:57


 


 


 Metoclopramide HCl


  (Reglan)  10 mg  ONCE  ONCE


 IV


   8/17/17 19:30


 8/17/17 19:31 DC 8/17/17 19:34


 











Procedures/MDM


EKG: 


Rate/Rhythm:             A. fib with RVR


QRS, ST, T-waves:    [No changes consistent w/ acute ischemia]


Impression:      A. fib with RVR


EKG: #2


Rate/Rhythm:             A. fib with RVR


QRS, ST, T-waves:    [No changes consistent w/ acute ischemia]


Impression:      A. fib with RVR


Chest X-ray 1V Interpreted by me:


Soft Tissue:                                               No acute 

abnormalities


Bones:                                                    No acute abnormalities


Mediastinum/Cardiac Silhouette/Lungs:     [No acute abnormalities]


CT abdomen pelvis with: 1.  No evidence of ischemic colitis.


2.  Extensive diverticular disease of the colon without diverticulitis.


3. Tiny gallstones are suggested without evidence of cholecystitis.


4.  Sliding hiatal hernia.


5.  Atherosclerotic calcification of the aorta and branches without evidence of 

arterial occlusion.


6.  Approximately 1.4 cm fat-containing umbilical hernia slightly to the left 

of midline.


7.  Severe degenerative disk narrowing at L2-3 and L3-4.


8.  Cardiomegaly, bullous changes of the lung bases and calcified diaphragmatic 

pleural plaques.


9.  Hepatic steatosis.





This 76-year-old male presents to the ER for evaluation of chest pain, 

shortness of breath and vomiting.  When I evaluated this patient he was in 

atrial fibrillation with rapid ventricular response with a rate of 135 bpm.  

The patient was given 10 mg of Cardizem with no relief, and was given a 

subsequent 10 mg Cardizem bolus with resolution of his atrial fibrillation.  

The patient had vomited 2 times was complaining of abdominal pain.  The patient 

has slight tenderness on my examination of the left upper quadrant.  The 

patient was given Zofran, morphine, Reglan with resolution of his nausea, I did 

obtain a CT abdomen pelvis with contrast to rule out ischemic colitis and there 

are no signs of ischemic colitis on the scan.  The patient will be admitted at 

this time under the care of Dr. Vaughn.  He will be placed on the 

telemetry floor at this time.





Critical Care: Excluding all billable procedures


   Time:             33 minutes


   Treatments/Evaluations:   Close monitoring and treatment of unstable vital 

signs, cardiorespiratory, and neurologic status, while maintaining tight 

balance of fluid, respiratory, and cardiac interventions.





Departure


Diagnosis:  


 Primary Impression:  


 Atrial fibrillation with RVR


 Additional Impression:  


 Chest pain


Condition:  Stable











DIPAK POLLARD DO Aug 17, 2017 19:50

## 2017-08-17 NOTE — RADRPT
PROCEDURE:   Chest x-ray 

 

CLINICAL INDICATION:   Chest pain

 

TECHNIQUE:   Chest single view

 

COMPARISON:   07/27/2017 

 

FINDINGS:

There is stable mild cardiomegaly and an sclerotic aortic calcification.  The pulmonary vessels are 
normal in caliber. There is stable calcified nodules versus pleural plaques in the left mid chest.  
Lungs otherwise clear..  The costophrenic angles are sharp.  The visualized bony thorax is unremarka
ble. 

 

IMPRESSION:

 

No acute cardiopulmonary disease. 

Stable cardiomegaly and atherosclerotic aortic calcification

Stable calcified nodule versus calcified pleural plaques in the left chest

 

RPTAT: HH

_____________________________________________ 

.Deangelo Jenkins MD, MD           Date    Time 

Electronically viewed and signed by .Deangelo Jenkins MD, MD on 08/17/2017 16:48 

 

D:  08/17/2017 16:48  T:  08/17/2017 16:48

.W/

## 2017-08-18 VITALS — RESPIRATION RATE: 22 BRPM | SYSTOLIC BLOOD PRESSURE: 149 MMHG | DIASTOLIC BLOOD PRESSURE: 68 MMHG

## 2017-08-18 VITALS — RESPIRATION RATE: 19 BRPM | SYSTOLIC BLOOD PRESSURE: 114 MMHG | DIASTOLIC BLOOD PRESSURE: 77 MMHG

## 2017-08-18 VITALS — SYSTOLIC BLOOD PRESSURE: 176 MMHG | DIASTOLIC BLOOD PRESSURE: 90 MMHG

## 2017-08-18 VITALS — HEART RATE: 95 BPM

## 2017-08-18 VITALS — DIASTOLIC BLOOD PRESSURE: 74 MMHG | RESPIRATION RATE: 19 BRPM | SYSTOLIC BLOOD PRESSURE: 147 MMHG

## 2017-08-18 VITALS — SYSTOLIC BLOOD PRESSURE: 135 MMHG | DIASTOLIC BLOOD PRESSURE: 75 MMHG | RESPIRATION RATE: 19 BRPM

## 2017-08-18 VITALS — HEART RATE: 90 BPM

## 2017-08-18 VITALS — DIASTOLIC BLOOD PRESSURE: 84 MMHG | SYSTOLIC BLOOD PRESSURE: 148 MMHG | RESPIRATION RATE: 19 BRPM

## 2017-08-18 VITALS — HEART RATE: 97 BPM

## 2017-08-18 VITALS — HEART RATE: 87 BPM

## 2017-08-18 VITALS — DIASTOLIC BLOOD PRESSURE: 92 MMHG | SYSTOLIC BLOOD PRESSURE: 138 MMHG

## 2017-08-18 VITALS — HEART RATE: 96 BPM

## 2017-08-18 VITALS — HEART RATE: 81 BPM

## 2017-08-18 VITALS — SYSTOLIC BLOOD PRESSURE: 180 MMHG | DIASTOLIC BLOOD PRESSURE: 104 MMHG | RESPIRATION RATE: 19 BRPM

## 2017-08-18 LAB
ABNORMAL IP MESSAGE: 1
ANION GAP SERPL CALC-SCNC: 14 MMOL/L (ref 8–16)
BASOPHILS # BLD AUTO: 0 10^3/UL (ref 0–0.1)
BASOPHILS NFR BLD: 0.4 % (ref 0–2)
BUN SERPL-MCNC: 10 MG/DL (ref 7–20)
CALCIUM SERPL-MCNC: 8.8 MG/DL (ref 8.4–10.2)
CHLORIDE SERPL-SCNC: 102 MMOL/L (ref 97–110)
CK MB SERPL-MCNC: 1.31 NG/ML (ref 0–2.4)
CK SERPL-CCNC: 30 IU/L (ref 23–200)
CO2 SERPL-SCNC: 31 MMOL/L (ref 21–31)
CREAT SERPL-MCNC: 0.73 MG/DL (ref 0.61–1.24)
EOSINOPHIL # BLD: 0 10^3/UL (ref 0–0.5)
EOSINOPHIL NFR BLD: 0 % (ref 0–7)
ERYTHROCYTE [DISTWIDTH] IN BLOOD BY AUTOMATED COUNT: 14.6 % (ref 11.5–14.5)
GLUCOSE SERPL-MCNC: 91 MG/DL (ref 70–220)
HCT VFR BLD CALC: 46.4 % (ref 42–52)
HGB BLD-MCNC: 15.1 G/DL (ref 14–18)
LYMPHOCYTES # BLD AUTO: 0.3 10^3/UL (ref 0.8–2.9)
LYMPHOCYTES NFR BLD AUTO: 12.5 % (ref 15–51)
MCH RBC QN AUTO: 30.4 PG (ref 29–33)
MCHC RBC AUTO-ENTMCNC: 32.5 G/DL (ref 32–37)
MCV RBC AUTO: 93.4 FL (ref 82–101)
MONOCYTES # BLD: 0.4 10^3/UL (ref 0.3–0.9)
MONOCYTES NFR BLD: 14.7 % (ref 0–11)
NEUTROPHILS NFR BLD AUTO: 72 % (ref 39–77)
NRBC # BLD MANUAL: 0 10^3/UL (ref 0–0)
NRBC BLD AUTO-RTO: 0 /100WBC (ref 0–0)
PLATELET # BLD: 149 10^3/UL (ref 140–415)
PMV BLD AUTO: 11.1 FL (ref 7.4–10.4)
POSITIVE DIFF: (no result)
POTASSIUM SERPL-SCNC: 3.9 MMOL/L (ref 3.5–5.1)
RBC # BLD AUTO: 4.97 10^6/UL (ref 4.7–6.1)
SODIUM SERPL-SCNC: 143 MMOL/L (ref 135–144)
TROPONIN I SERPL-MCNC: < 0.012 NG/ML (ref 0–0.12)
WBC # BLD AUTO: 2.7 10^3/UL (ref 4.8–10.8)

## 2017-08-18 RX ADMIN — SUCRALFATE SCH GM: 1 TABLET ORAL at 20:12

## 2017-08-18 RX ADMIN — APIXABAN SCH MG: 5 TABLET, FILM COATED ORAL at 20:12

## 2017-08-18 RX ADMIN — METOPROLOL TARTRATE SCH MG: 100 TABLET, FILM COATED ORAL at 07:56

## 2017-08-18 RX ADMIN — METOPROLOL TARTRATE SCH MG: 100 TABLET, FILM COATED ORAL at 20:12

## 2017-08-18 RX ADMIN — APIXABAN SCH MG: 5 TABLET, FILM COATED ORAL at 08:23

## 2017-08-18 RX ADMIN — ASPIRIN 81 MG SCH MG: 81 TABLET ORAL at 08:23

## 2017-08-18 RX ADMIN — ATORVASTATIN CALCIUM SCH MG: 20 TABLET, FILM COATED ORAL at 20:11

## 2017-08-18 RX ADMIN — PANTOPRAZOLE SODIUM SCH MG: 40 TABLET, DELAYED RELEASE ORAL at 05:05

## 2017-08-18 RX ADMIN — SUCRALFATE SCH GM: 1 TABLET ORAL at 08:23

## 2017-08-18 NOTE — HP
DATE OF ADMISSION:  08/17/2017

 

 

 

HISTORY OF PRESENT ILLNESS:  The patient is a 76-year-old

gentleman well known to me from previous followup, presenting

with abdominal discomfort with nausea and vomiting started 2 days

ago, and he also had intermittent left-sided chest pain described

as a pressure with no radiation and patient was evaluated

emergency room and was admitted.

 

 

 

MEDICATIONS:  Include Eliquis 5 mg b.i.d., aspirin 81 mg 2 daily,

simethicone 1 tablet q.i.d., Carafate 1 g b.i.d., Protonix 40 mg

q.day, metoprolol tartrate 100 mg b.i.d., simvastatin 40 mg p.o.

at bedtime.  He had previously been on amiodarone, which has been

discontinued.

 

 

 

ALLERGIES:  NO KNOWN ALLERGIES.

 

 

 

PAST MEDICAL HISTORY:  The patient was last hospitalized about a

week ago for chest pain, which was felt to be noncardiac,

musculoskeletal, improved with nonsteroidal anti-inflammatory

medication.

 

 

 

REVIEW OF SYSTEMS:

 

HEAD:  No history of headache, focal weakness or numbness.  Prior

history of stroke.

 

EYES:  No blurry vision or glaucoma.

 

ENT:  Noncontributory.

 

NECK:  No history of thyroid disease.

 

CHEST:  No bronchitis, __________ or asthma.

 

HEART:  Patient has history of coronary artery disease, status

post PTCA of the proximal LAD with a drug-eluting stent.  History

of recurrent Afib and history of hypertrophic obstructive

cardiomyopathy.  Lexiscan done a few months prior showed no

evidence of perfusion defect.

 

GASTROINTESTINAL:  History of GERD and esophagitis.  Patient

medical endoscopy about a month ago.  No prior history of

gallstones.

 

GENITOURINARY:  No dysuria, hematuria, kidney stones.

 

 

 

PHYSICAL EXAMINATION:

 

GENERAL APPEARANCE:  The patient is an average built male, who is

presently in no acute distress.  Does not appear anxious.  Mild

nausea.

 

VITAL SIGNS:  Temperature 98.1, blood pressure 180/104 presently,

respirations 20 per minute.

 

HEENT:  Head normocephalic.

 

NECK:  JVD is not increased.

 

CHEST:  Decreased breath sounds at the bases.

 

ABDOMEN:  Obese.  No definite tenderness on palpation of right

upper quadrant.  Bowel sounds are active.  No pulsatile masses.

No evidence of any inguinal hernia.

 

EXTREMITIES:  No edema.  Homans negative.

 

NEUROLOGIC:  No localizing or lateralizing signs.

 

 

 

LABORATORY DATA:  Initial WBC count 4.8, hematocrit 48.5, sodium

144, potassium 3.8, BUN is 12, creatinine 0.77.  Troponin x3 is

negative.  Alk phos is 108.  Chest x-ray done in the ER shows a

stable calcified nodule versus calcified pleural plaques on the

left chest.  CT of the abdomen and pelvis shows extensive

diverticular disease without diverticulitis.  Tiny gallstones are

suggested in the independent portion without evidence of

cholecystitis.  Hepatic steatosis noted.

 

 

 

IMPRESSION:

 

1.   Abdominal pain with nausea.  History of gastroesophageal

  reflux disease.  Patient may have cholelithiasis causing some of

  the symptoms.

2.   Atypical chest pain.  The patient does have tenderness on

palpation of the left anterior chest wall, but he also does have

underlying coronary artery disease.

3.   Atrial fibrillation with a rapid ventricular response when

patient presented to the ER initially, improved with intravenous

diltiazem.  Doubt acute coronary ischemia.

4.   Hypertrophic obstructive cardiomyopathy.

5.   Chronic leukopenia.

 

 

PLAN:  We will closely monitor the patient for cholecystitis.  We

will also request a GI consultation with Dr. Lr and

cardiology consultation with Dr. Lemus.  Continue IV

hydration.  Patient presently hypertensive.  We will start the

patient with clonidine on a p.r.n. basis.

 

 

 

 

 

 

 

Dictated By:  Kike Mata MD

 

 

 

/jorge luis/faith

 

Job#:  [id number]/Document#:  [id number]

 

D:  08/18/2017 08:53

 

T:  08/18/2017 09:21

## 2017-08-18 NOTE — CONS
Date/Time of Note


Date/Time of Note


DATE: 8/18/17 


TIME: 20:24





Assessment/Plan


Assessment/Plan


Additional Assessment/Plan


1.  Abdominal pain associated with nausea and vomiting, it is possible this may 

be related to symptomatic gallstone.  It could be due to functional dyspepsia


2.  Hypertrophic cardiomyopathy


3.  Atrial fibrillation


4.  Atypical sensation on the left side of the chest


5.  Diverticulosis


6.  Hepatic steatosis


Plan


Continue PPI and Carafate


Reassured the patient





Consultation Date/Type/Reason


Admit Date/Time


Aug 17, 2017 at 19:47


Reason for Consultation


Abdominal pain nausea vomiting, and atypical sensation in the left side of the 

chest





Hx of Present Illness


76-year-old male with a history of hypertrophic cardiomyopathy, atrial 

fibrillation comes to the emergency room complaining of pain in the epigastric 

area associated with nausea and vomiting.  He also feels that he is atypical 

sensation in the left side of the chest.  All this pain has now disappeared.  

No GI bleeding.  He had a EGD done by me which showed mucosal ring at the GE 

junction.  No chest pain or shortness of breath now no fever no chills.  No  

or CNS problem.





Past Surgical History


Past Surgical Hx:  angioplasty, other





Social History


Smoking Status:  Former smoker





Exam/Review of Systems


Vital Signs


Vitals





 Vital Signs








  Date Time  Temp Pulse Resp B/P Pulse Ox O2 Delivery O2 Flow Rate FiO2


 


8/18/17 20:05  97      


 


8/18/17 19:47 98.2  19 147/74 97   


 


8/17/17 20:39      Nasal Cannula 3.0 














 Intake and Output   


 


 8/17/17 8/17/17 8/18/17





 15:00 23:00 07:00


 


Intake Total   450 ml


 


Balance   450 ml











Exam


Constitutional:  alert, oriented, well developed


Psych:  nl mood/affect, no complaints


Head:  atraumatic, normocephalic


Eyes:  EOMI, PERRL, nl conjunctiva, nl lids, nl sclera


ENMT:  nl external ears & nose, nl lips & teeth, nl nasal mucosa & septum


Neck:  non-tender, supple


Respiratory:  clear to auscultation, normal air movement


Cardiovascular:  nl pulses, regular rate and rhythm


Gastrointestinal:  nl liver, spleen, non-tender, soft


Musculoskeletal:  nl extremities to inspection, nl gait and stance


Extremities:  normal pulses


Neurological:  CNS II-XII intact, nl mental status, nl speech, nl strength


Skin:  nl turgor, 


   No rash or lesions


Lymph:  nl lymph nodes





Results


Result Diagram:  


8/18/17 0600                                                                   

             8/18/17 0540





Results 24 hrs





Laboratory Tests








Test


  8/17/17


22:27 8/18/17


05:40 8/18/17


06:00


 


Creatine Kinase 35   30   


 


Creatine Kinase Index 3.7   4.4   


 


Creatinine Kinase MB (Mass) 1.30   1.31   


 


Troponin I < 0.012   < 0.012   


 


Sodium Level  143   


 


Potassium Level  3.9   


 


Chloride Level  102   


 


Carbon Dioxide Level  31   


 


Anion Gap  14   


 


Blood Urea Nitrogen  10   


 


Creatinine  0.73   


 


Glucose Level  91  # 


 


Calcium Level  8.8   


 


White Blood Count   2.7  #L


 


Red Blood Count   4.97  


 


Hemoglobin   15.1  


 


Hematocrit   46.4  


 


Mean Corpuscular Volume   93.4  


 


Mean Corpuscular Hemoglobin   30.4  


 


Mean Corpuscular Hemoglobin


Concent 


  


  32.5  


 


 


Red Cell Distribution Width   14.6  H


 


Platelet Count   149  


 


Mean Platelet Volume   11.1  H


 


Neutrophils %   72.0  


 


Lymphocytes %   12.5  L


 


Monocytes %   14.7  H


 


Eosinophils %   0.0  


 


Basophils %   0.4  


 


Nucleated Red Blood Cells %   0.0  


 


Neutrophils # (Manual)   2  


 


Lymphocytes #   0.3  L


 


Monocytes #   0.4  


 


Eosinophils #   0.0  


 


Basophils #   0.0  


 


Nucleated Red Blood Cells #   0.0  











Medications


Medications





 Current Medications


Apixaban (Eliquis) 5 mg BID PO  Last administered on 8/18/17at 20:12; Admin 

Dose 5 MG;  Start 8/17/17 at 22:40


Lorazepam (Ativan) 0.5 mg Q4  PRN IV ANXIETY;  Start 8/17/17 at 22:30


Morphine Sulfate (morphine) 4 mg Q4H  PRN IV SEVERE PAIN LEVEL 7-10;  Start 8/17 /17 at 22:30


Acetaminophen (Tylenol Tab) 500 mg Q4H  PRN PO PAIN AND OR ELEVATED TEMP;  

Start 8/17/17 at 22:30


Acetaminophen/ Hydrocodone Bitart (Norco (5/325)) 1 tab Q4H  PRN PO PAIN LEVEL 4

-7;  Start 8/17/17 at 22:30


Ondansetron HCl (Zofran Inj) 4 mg Q4H  PRN IV NAUSEA AND/OR VOMITING Last 

administered on 8/18/17at 07:20; Admin Dose 4 MG;  Start 8/17/17 at 22:30


Aspirin (Aspirin) 81 mg DAILY PO  Last administered on 8/18/17at 08:23; Admin 

Dose 81 MG;  Start 8/18/17 at 09:00


Simethicone (Mylicon) 80 mg QID  PRN PO DISTENSION/GAS/BLOATING;  Start 8/17/17 

at 22:30


Metoprolol Tartrate (Lopressor) 100 mg BID PO  Last administered on 8/18/17at 20

:12; Admin Dose 100 MG;  Start 8/17/17 at 22:30


Pantoprazole (Protonix Tab) 40 mg DAILY@06 PO  Last administered on 8/18/17at 05

:05; Admin Dose 40 MG;  Start 8/18/17 at 06:00


Sucralfate (Carafate) 1 gm BID PO  Last administered on 8/18/17at 20:12; Admin 

Dose 1 GM;  Start 8/18/17 at 09:00


Atorvastatin Calcium (Lipitor) 20 mg HS PO  Last administered on 8/18/17at 20:11

; Admin Dose 20 MG;  Start 8/17/17 at 22:30


Clonidine (Catapres) 0.1 mg QID  PRN PO SBP >170 OR DBP >110;  Start 8/18/17 at 

09:00











KRISTINE LEVIN MD Aug 18, 2017 20:28

## 2017-08-19 VITALS — RESPIRATION RATE: 19 BRPM | SYSTOLIC BLOOD PRESSURE: 130 MMHG | DIASTOLIC BLOOD PRESSURE: 78 MMHG

## 2017-08-19 VITALS — HEART RATE: 90 BPM

## 2017-08-19 VITALS — RESPIRATION RATE: 19 BRPM | DIASTOLIC BLOOD PRESSURE: 86 MMHG | SYSTOLIC BLOOD PRESSURE: 141 MMHG

## 2017-08-19 VITALS — SYSTOLIC BLOOD PRESSURE: 115 MMHG | RESPIRATION RATE: 19 BRPM | DIASTOLIC BLOOD PRESSURE: 68 MMHG

## 2017-08-19 VITALS — HEART RATE: 92 BPM

## 2017-08-19 VITALS — HEART RATE: 87 BPM

## 2017-08-19 VITALS — HEART RATE: 110 BPM

## 2017-08-19 LAB
ABNORMAL IP MESSAGE: 1
ANION GAP SERPL CALC-SCNC: 18 MMOL/L (ref 8–16)
BASOPHILS # BLD AUTO: 0 10^3/UL (ref 0–0.1)
BASOPHILS NFR BLD: 0.8 % (ref 0–2)
BUN SERPL-MCNC: 12 MG/DL (ref 7–20)
CALCIUM SERPL-MCNC: 8.8 MG/DL (ref 8.4–10.2)
CHLORIDE SERPL-SCNC: 102 MMOL/L (ref 97–110)
CO2 SERPL-SCNC: 27 MMOL/L (ref 21–31)
CREAT SERPL-MCNC: 0.63 MG/DL (ref 0.61–1.24)
EOSINOPHIL # BLD: 0 10^3/UL (ref 0–0.5)
EOSINOPHIL NFR BLD: 0.3 % (ref 0–7)
ERYTHROCYTE [DISTWIDTH] IN BLOOD BY AUTOMATED COUNT: 14.5 % (ref 11.5–14.5)
GLUCOSE SERPL-MCNC: 78 MG/DL (ref 70–220)
HCT VFR BLD CALC: 49.7 % (ref 42–52)
HGB BLD-MCNC: 16.1 G/DL (ref 14–18)
LYMPHOCYTES # BLD AUTO: 0.4 10^3/UL (ref 0.8–2.9)
LYMPHOCYTES NFR BLD AUTO: 11.7 % (ref 15–51)
MAGNESIUM SERPL-MCNC: 2.1 MG/DL (ref 1.7–2.5)
MCH RBC QN AUTO: 29.8 PG (ref 29–33)
MCHC RBC AUTO-ENTMCNC: 32.4 G/DL (ref 32–37)
MCV RBC AUTO: 91.9 FL (ref 82–101)
MONOCYTES # BLD: 0.4 10^3/UL (ref 0.3–0.9)
MONOCYTES NFR BLD: 10.3 % (ref 0–11)
NEUTROPHILS NFR BLD AUTO: 76.6 % (ref 39–77)
NRBC # BLD MANUAL: 0 10^3/UL (ref 0–0)
NRBC BLD AUTO-RTO: 0 /100WBC (ref 0–0)
PLATELET # BLD: 141 10^3/UL (ref 140–415)
PMV BLD AUTO: 11.7 FL (ref 7.4–10.4)
POSITIVE DIFF: (no result)
POTASSIUM SERPL-SCNC: 3.5 MMOL/L (ref 3.5–5.1)
RBC # BLD AUTO: 5.41 10^6/UL (ref 4.7–6.1)
SODIUM SERPL-SCNC: 143 MMOL/L (ref 135–144)
WBC # BLD AUTO: 3.6 10^3/UL (ref 4.8–10.8)

## 2017-08-19 RX ADMIN — ASPIRIN 81 MG SCH MG: 81 TABLET ORAL at 09:01

## 2017-08-19 RX ADMIN — SUCRALFATE SCH GM: 1 TABLET ORAL at 09:01

## 2017-08-19 RX ADMIN — PANTOPRAZOLE SODIUM SCH MG: 40 TABLET, DELAYED RELEASE ORAL at 05:25

## 2017-08-19 RX ADMIN — METOPROLOL TARTRATE SCH MG: 100 TABLET, FILM COATED ORAL at 09:01

## 2017-08-19 RX ADMIN — APIXABAN SCH MG: 5 TABLET, FILM COATED ORAL at 09:01

## 2017-08-19 NOTE — DS
DATE OF ADMISSION:  08/17/2017

 

DATE OF DISCHARGE:  08/19/2017

 

 

 

FINAL DIAGNOSES:

 

1.   Atypical chest pain.  No evidence of acute myocardial

  infarction, likely musculoskeletal in etiology.

2.   Abdominal pain with nausea, combination of GERD and

cholelithiasis.

3.   Atrial fibrillation with rapid ventricular response,

improved.

4.   Underlying coronary artery disease.

5.   Hypertrophic obstructive cardiomyopathy.

6.   Chronic leukopenia.

 

 

HOSPITAL COURSE:  The patient is a 76-year-old gentleman well

known to me from previous follow-up presenting with increasing

abdominal discomfort with nausea, vomiting, and also intermittent

left-sided chest pain described as pressure, evaluated and found

to have atrial fibrillation with a rapid ventricular response.

He was given intravenous diltiazem with improvement.  He was

placed on PPIs and Carafate, and had a CT scan of the abdomen

which showed extensive diverticular disease without

diverticulitis, tiny gallstones without evidence of

cholecystitis, and hepatic steatosis.  The patient was seen by

Dr. Lr from GI standpoint and advised to continue same GI

treatment.  The patient was discharged home in much improved

condition to follow all his prior medications which include:

Carafate 1 g b.i.d., simethicone 80 mg q.i.d., pantoprazole 40 mg

daily, metoprolol 100 mg b.i.d., Lipitor 20 mg q.h.s., aspirin 81

mg daily, Eliquis 5 mg p.o. b.i.d.  The patient will be followed

in my office over the course of the next 2 weeks.

 

 

 

DISCHARGE CONDITION:  Much improved.

 

 

 

 

 

 

 

Dictated By:  Kike Mata MD

 

 

 

/jorge luis/kimberly

 

Job#:  [id number]/Document#:  [id number]

 

D:  08/19/2017 14:49

 

T:  08/19/2017 21:23

## 2017-08-19 NOTE — CONS
Date/Time of Note


Date/Time of Note


DATE: 8/19/17 


TIME: 11:50





Assessment/Plan


Assessment/Plan


Chief Complaint/Hosp Course


76-year-old male with a history of hypertrophic cardiomyopathy, atrial 

fibrillation comes to the emergency room complaining of pain in the epigastric 

area associated with nausea and vomiting.  He also feels that he is atypical 

sensation in the left side of the chest.  All this pain has now disappeared.  

No GI bleeding.  He had a EGD done by me which showed mucosal ring at the GE 

junction.  No chest pain or shortness of breath now no fever no chills.  No  

or CNS problem.


Problems:  


Additional Assessment/Plan


Additional Assessment/Plan


1.  Abdominal pain associated with nausea and vomiting, it is possible this may 

be related to symptomatic gallstone.  It could be due to functional dyspepsia


2.  Hypertrophic cardiomyopathy


3.  Atrial fibrillation


4.  Atypical sensation on the left side of the chest


5.  Diverticulosis


6.  Hepatic steatosis


7.  Belching, this is related to his anxiety


Plan


Continue PPI and Carafate


Reassured the patient


Exercise, meditation





Consultation Date/Type/Reason


Admit Date/Time


Aug 17, 2017 at 19:47


Initial Consult Date








24 HR Interval Summary


Free Text/Dictation


Patient complains of constant belching





Exam/Review of Systems


Vital Signs


Vitals





 Vital Signs








  Date Time  Temp Pulse Resp B/P Pulse Ox O2 Delivery O2 Flow Rate FiO2


 


8/19/17 11:38 98.1 92 19 115/68 96   


 


8/17/17 20:39      Nasal Cannula 3.0 














 Intake and Output   


 


 8/18/17 8/18/17 8/19/17





 14:59 22:59 06:59


 


Intake Total  640 ml 500 ml


 


Balance  640 ml 500 ml











Exam


Constitutional:  alert, oriented, well developed


Psych:  nl mood/affect, no complaints


Head:  atraumatic, normocephalic


Eyes:  EOMI, PERRL, nl conjunctiva, nl lids, nl sclera


ENMT:  nl external ears & nose, nl lips & teeth, nl nasal mucosa & septum


Neck:  non-tender, supple


Respiratory:  clear to auscultation, normal air movement


Cardiovascular:  nl pulses, regular rate and rhythm


Gastrointestinal:  nl liver, spleen, non-tender, soft


Musculoskeletal:  nl extremities to inspection, nl gait and stance


Extremities:  normal pulses


Neurological:  CNS II-XII intact, nl mental status, nl speech, nl strength


Skin:  nl turgor, 


   No rash or lesions


Lymph:  nl lymph nodes





Results


Result Diagram:  


8/19/17 0747                                                                   

             8/19/17 0747





Results 24 hrs





Laboratory Tests








Test


  8/19/17


07:47


 


White Blood Count 3.6  #L


 


Red Blood Count 5.41  


 


Hemoglobin 16.1  


 


Hematocrit 49.7  


 


Mean Corpuscular Volume 91.9  


 


Mean Corpuscular Hemoglobin 29.8  


 


Mean Corpuscular Hemoglobin


Concent 32.4  


 


 


Red Cell Distribution Width 14.5  


 


Platelet Count 141  


 


Mean Platelet Volume 11.7  H


 


Neutrophils % 76.6  


 


Lymphocytes % 11.7  L


 


Monocytes % 10.3  


 


Eosinophils % 0.3  


 


Basophils % 0.8  


 


Nucleated Red Blood Cells % 0.0  


 


Neutrophils # (Manual) 3  


 


Lymphocytes # 0.4  L


 


Monocytes # 0.4  


 


Eosinophils # 0.0  


 


Basophils # 0.0  


 


Nucleated Red Blood Cells # 0.0  


 


Sodium Level 143  


 


Potassium Level 3.5  


 


Chloride Level 102  


 


Carbon Dioxide Level 27  


 


Anion Gap 18  H


 


Blood Urea Nitrogen 12  


 


Creatinine 0.63  


 


Glucose Level 78  


 


Calcium Level 8.8  


 


Magnesium Level 2.1  











Medications


Medications





 Current Medications


Apixaban (Eliquis) 5 mg BID PO  Last administered on 8/19/17at 09:01; Admin 

Dose 5 MG;  Start 8/17/17 at 22:40


Lorazepam (Ativan) 0.5 mg Q4  PRN IV ANXIETY;  Start 8/17/17 at 22:30


Morphine Sulfate (morphine) 4 mg Q4H  PRN IV SEVERE PAIN LEVEL 7-10;  Start 8/17 /17 at 22:30


Acetaminophen (Tylenol Tab) 500 mg Q4H  PRN PO PAIN AND OR ELEVATED TEMP;  

Start 8/17/17 at 22:30


Acetaminophen/ Hydrocodone Bitart (Norco (5/325)) 1 tab Q4H  PRN PO PAIN LEVEL 4

-7;  Start 8/17/17 at 22:30


Ondansetron HCl (Zofran Inj) 4 mg Q4H  PRN IV NAUSEA AND/OR VOMITING Last 

administered on 8/18/17at 07:20; Admin Dose 4 MG;  Start 8/17/17 at 22:30


Aspirin (Aspirin) 81 mg DAILY PO  Last administered on 8/19/17at 09:01; Admin 

Dose 81 MG;  Start 8/18/17 at 09:00


Simethicone (Mylicon) 80 mg QID  PRN PO DISTENSION/GAS/BLOATING;  Start 8/17/17 

at 22:30


Metoprolol Tartrate (Lopressor) 100 mg BID PO  Last administered on 8/19/17at 09

:01; Admin Dose 100 MG;  Start 8/17/17 at 22:30


Pantoprazole (Protonix Tab) 40 mg DAILY@06 PO  Last administered on 8/19/17at 05

:25; Admin Dose 40 MG;  Start 8/18/17 at 06:00


Sucralfate (Carafate) 1 gm BID PO  Last administered on 8/19/17at 09:01; Admin 

Dose 1 GM;  Start 8/18/17 at 09:00


Atorvastatin Calcium (Lipitor) 20 mg HS PO  Last administered on 8/18/17at 20:11

; Admin Dose 20 MG;  Start 8/17/17 at 22:30


Clonidine (Catapres) 0.1 mg QID  PRN PO SBP >170 OR DBP >110;  Start 8/18/17 at 

09:00











KRISTINE LEVIN MD Aug 19, 2017 11:52

## 2017-10-29 ENCOUNTER — HOSPITAL ENCOUNTER (OUTPATIENT)
Dept: HOSPITAL 10 - E/R | Age: 77
Setting detail: OBSERVATION
LOS: 2 days | Discharge: HOME | End: 2017-10-31
Attending: INTERNAL MEDICINE | Admitting: INTERNAL MEDICINE
Payer: MEDICARE

## 2017-10-29 VITALS
WEIGHT: 216.05 LBS | WEIGHT: 216.05 LBS | BODY MASS INDEX: 30.93 KG/M2 | HEIGHT: 70 IN | BODY MASS INDEX: 30.93 KG/M2 | HEIGHT: 70 IN

## 2017-10-29 VITALS — HEART RATE: 103 BPM | DIASTOLIC BLOOD PRESSURE: 93 MMHG | RESPIRATION RATE: 20 BRPM | SYSTOLIC BLOOD PRESSURE: 140 MMHG

## 2017-10-29 VITALS — DIASTOLIC BLOOD PRESSURE: 92 MMHG | SYSTOLIC BLOOD PRESSURE: 123 MMHG | RESPIRATION RATE: 20 BRPM

## 2017-10-29 VITALS — HEART RATE: 109 BPM

## 2017-10-29 VITALS — HEART RATE: 105 BPM

## 2017-10-29 DIAGNOSIS — Z95.0: ICD-10-CM

## 2017-10-29 DIAGNOSIS — I48.91: Primary | ICD-10-CM

## 2017-10-29 DIAGNOSIS — K21.9: ICD-10-CM

## 2017-10-29 DIAGNOSIS — I25.2: ICD-10-CM

## 2017-10-29 DIAGNOSIS — E66.9: ICD-10-CM

## 2017-10-29 DIAGNOSIS — I25.10: ICD-10-CM

## 2017-10-29 DIAGNOSIS — Z79.82: ICD-10-CM

## 2017-10-29 DIAGNOSIS — I42.1: ICD-10-CM

## 2017-10-29 DIAGNOSIS — Z86.73: ICD-10-CM

## 2017-10-29 DIAGNOSIS — D72.819: ICD-10-CM

## 2017-10-29 DIAGNOSIS — R07.89: ICD-10-CM

## 2017-10-29 DIAGNOSIS — Z98.61: ICD-10-CM

## 2017-10-29 DIAGNOSIS — I70.0: ICD-10-CM

## 2017-10-29 LAB
ABNORMAL IP MESSAGE: 1
ANION GAP SERPL CALC-SCNC: 16 MMOL/L (ref 8–16)
BASOPHILS NFR BLD: 0.8 % (ref 0–2)
BUN SERPL-MCNC: 12 MG/DL (ref 7–20)
CALCIUM SERPL-MCNC: 9.1 MG/DL (ref 8.4–10.2)
CHLORIDE SERPL-SCNC: 105 MMOL/L (ref 97–110)
CK MB SERPL-MCNC: 1.16 NG/ML (ref 0–2.4)
CK MB SERPL-MCNC: 1.5 NG/ML (ref 0–2.4)
CK SERPL-CCNC: 39 IU/L (ref 23–200)
CK SERPL-CCNC: 43 IU/L (ref 23–200)
CO2 SERPL-SCNC: 29 MMOL/L (ref 21–31)
CREAT SERPL-MCNC: 0.73 MG/DL (ref 0.61–1.24)
EOSINOPHIL NFR BLD: 0.4 % (ref 0–7)
ERYTHROCYTE [DISTWIDTH] IN BLOOD BY AUTOMATED COUNT: 15.1 % (ref 11.5–14.5)
GLUCOSE SERPL-MCNC: 128 MG/DL (ref 70–220)
HCT VFR BLD CALC: 50.8 % (ref 42–52)
HGB BLD-MCNC: 16.1 G/DL (ref 14–18)
LYMPHOCYTES NFR BLD AUTO: 7.9 % (ref 15–51)
MCH RBC QN AUTO: 30.1 PG (ref 29–33)
MCHC RBC AUTO-ENTMCNC: 31.7 G/DL (ref 32–37)
MCV RBC AUTO: 95.1 FL (ref 82–101)
MONOCYTES NFR BLD: 8.5 % (ref 0–11)
NEUTROPHILS NFR BLD AUTO: 82.2 % (ref 39–77)
NRBC BLD AUTO-RTO: 0 /100WBC (ref 0–0)
PLATELET # BLD: 182 10^3/UL (ref 140–415)
PMV BLD AUTO: 10.9 FL (ref 7.4–10.4)
POSITIVE DIFF: (no result)
POTASSIUM SERPL-SCNC: 3.9 MMOL/L (ref 3.5–5.1)
RBC # BLD AUTO: 5.34 10^6/UL (ref 4.7–6.1)
SODIUM SERPL-SCNC: 146 MMOL/L (ref 135–144)
TROPONIN I SERPL-MCNC: 0.02 NG/ML (ref 0–0.12)
TROPONIN I SERPL-MCNC: < 0.012 NG/ML (ref 0–0.12)
TROPONIN I SERPL-MCNC: < 0.012 NG/ML (ref 0–0.12)
WBC # BLD AUTO: 5.1 10^3/UL (ref 4.8–10.8)

## 2017-10-29 PROCEDURE — 85025 COMPLETE CBC W/AUTO DIFF WBC: CPT

## 2017-10-29 PROCEDURE — 96376 TX/PRO/DX INJ SAME DRUG ADON: CPT

## 2017-10-29 PROCEDURE — 96374 THER/PROPH/DIAG INJ IV PUSH: CPT

## 2017-10-29 PROCEDURE — 82553 CREATINE MB FRACTION: CPT

## 2017-10-29 PROCEDURE — 99285 EMERGENCY DEPT VISIT HI MDM: CPT

## 2017-10-29 PROCEDURE — 96375 TX/PRO/DX INJ NEW DRUG ADDON: CPT

## 2017-10-29 PROCEDURE — 71010: CPT

## 2017-10-29 PROCEDURE — 80048 BASIC METABOLIC PNL TOTAL CA: CPT

## 2017-10-29 PROCEDURE — 84484 ASSAY OF TROPONIN QUANT: CPT

## 2017-10-29 PROCEDURE — 93005 ELECTROCARDIOGRAM TRACING: CPT

## 2017-10-29 PROCEDURE — 82550 ASSAY OF CK (CPK): CPT

## 2017-10-29 PROCEDURE — 90686 IIV4 VACC NO PRSV 0.5 ML IM: CPT

## 2017-10-29 PROCEDURE — 83735 ASSAY OF MAGNESIUM: CPT

## 2017-10-29 PROCEDURE — 82962 GLUCOSE BLOOD TEST: CPT

## 2017-10-29 PROCEDURE — 36415 COLL VENOUS BLD VENIPUNCTURE: CPT

## 2017-10-29 PROCEDURE — 80053 COMPREHEN METABOLIC PANEL: CPT

## 2017-10-29 RX ADMIN — METOPROLOL TARTRATE SCH MG: 5 INJECTION, SOLUTION INTRAVENOUS at 08:10

## 2017-10-29 RX ADMIN — SUCRALFATE SCH GM: 1 TABLET ORAL at 20:51

## 2017-10-29 RX ADMIN — APIXABAN SCH MG: 5 TABLET, FILM COATED ORAL at 20:51

## 2017-10-29 RX ADMIN — BUSPIRONE HYDROCHLORIDE SCH MG: 5 TABLET ORAL at 20:51

## 2017-10-29 RX ADMIN — ATORVASTATIN CALCIUM SCH MG: 20 TABLET, FILM COATED ORAL at 20:51

## 2017-10-29 RX ADMIN — PANTOPRAZOLE SODIUM SCH MG: 40 TABLET, DELAYED RELEASE ORAL at 16:19

## 2017-10-29 RX ADMIN — METOPROLOL TARTRATE SCH MG: 5 INJECTION, SOLUTION INTRAVENOUS at 07:44

## 2017-10-29 RX ADMIN — METOPROLOL TARTRATE SCH MG: 5 INJECTION, SOLUTION INTRAVENOUS at 08:33

## 2017-10-29 RX ADMIN — METOPROLOL TARTRATE SCH MG: 100 TABLET, FILM COATED ORAL at 20:52

## 2017-10-29 NOTE — ERD
ER Documentation


Chief Complaint


Chief Complaint


non radiating, unprovoked cp & sob since 0300, Hx: A Fib





HPI


This is a 77-year-old male with a past medical history of atrial fibrillation 

on metoprolol for rate control and eliquis for anticoagulation, coronary artery 

disease with previous MI status post stenting, most recently this year, last 

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 

peripheral vision deficits, GERD, hyperlipidemia who is presenting with 

moderate unprovoked nonradiating chest pain, diaphoresis and shortness of 

breath that began at 3 AM this morning, eating him from sleep.  He did take his 

metoprolol this morning, but it did not resolve his symptoms, so he came to the 

emergency department for further evaluation.  He did not take his aspirin or 

Eliquis this morning.





The patient denies feeling sick recently.  The patient denies fever or chills.  

The patient has had no headache or vision changes.  The patient denies 

lightheadedness or dizziness.  The patient does not endorse nausea or vomiting.

  The patient denies abdominal pain or changes to bowel movements or urination.

  The patient has had no focal deficits.  The patient has had no weakness or 

numbness or tingling to the face or extremities.





ROS


All systems reviewed and are negative except as per history of present illness.





Medications


Home Meds


Active Scripts


Apixaban* (Eliquis*) 5 Mg Tablet, 5 MG PO BID for 30 Days, #60 TAB


   Prov:ASAD MATA MD         7/22/17


Simethicone* (Mylicon*) 80 Mg Tab, 80 MG GTB QID Y for DISTENSION/GAS/BLOATING 

for 30 Days, #120 TAB


   Prov:ASAD MATA MD         7/22/17


Aspirin* (Aspirin* EC) 81 Mg Tablet., 81 MG PO DAILY for 30 Days, #30


   Prov:ASAD MATA MD         6/3/17


Reported Medications


Sucralfate* (Carafate*) 1 Gm Tab, 1 GM PO BID, TAB


   7/19/17


Pantoprazole* (Protonix*) 40 Mg Tablet., 40 MG PO DAILY, TAB


   7/19/17


Metoprolol Tartrate* (Lopressor*) 100 Mg Tablet, 100 MG PO BID, #60 TAB


   7/19/17


Simvastatin* (Zocor*) 40 Mg Tablet, 40 MG PO QHS, #30 TAB


   4/5/17





Allergies


Allergies:  


Coded Allergies:  


     No Known Allergies (Verified  Allergy, Unknown, 8/18/17)





PMhx/Soc


History of Surgery:  Yes (CARDIAC STENTS;)


Anesthesia Reaction:  No


Hx Neurological Disorder:  Yes (STROKE IN 2016)


Hx Respiratory Disorders:  No


Hx Cardiac Disorders:  Yes (HTN;A-FIB;MI NOVEMBER 2016;)


Hx Psychiatric Problems:  No


Hx Miscellaneous Medical Probl:  No


Hx Alcohol Use:  Yes (OCCASIONALLY)


Hx Substance Use:  No





FmHx


Family History:  No diabetes





Physical Exam


Vitals





Vital Signs








  Date Time  Temp Pulse Resp B/P Pulse Ox O2 Delivery O2 Flow Rate FiO2


 


10/29/17 08:45  96 20 138/107 100 Nasal Cannula 2.0 


 


10/29/17 07:30      Nasal Cannula 2 


 


10/29/17 07:07 97.9 114 22 166/94 98   








Physical Exam


Const:     No apparent distress, well-developed, well-nourished


Head:   Atraumatic 


Eyes:    Normal Conjunctiva.  Extraocular movements intact.


ENT:    Normal External Ears, Nose and Mouth.


Neck:                 Full range of motion. ~ No meningismus.


Resp:   Clear to auscultation bilaterally


Cardio:   Mild tachycardia, irregularly irregular rhythm, no murmurs


Abd:    BMI of 31, soft, non tender, non distended. Normal bowel sounds


Skin:   No petechiae or rashes


Back:   No midline or flank tenderness


Ext:    No cyanosis, or edema


Neur:   Awake and alert, oriented 4.  Cranial nerves intact.  No facial droop.

  Normal strength and sensation in all extremities.  Coordination with finger 

to nose normal.


Psych:    Normal Mood and Affect


Result Diagram:  


10/29/17 0730                                                                  

              10/29/17 0730





Results 24 hrs





 Laboratory Tests








Test


  10/29/17


07:30 10/29/17


07:36


 


White Blood Count 5.110^3/ul  


 


Red Blood Count 5.3410^6/ul  


 


Hemoglobin 16.1g/dl  


 


Hematocrit 50.8%  


 


Mean Corpuscular Volume 95.1fl  


 


Mean Corpuscular Hemoglobin 30.1pg  


 


Mean Corpuscular Hemoglobin


Concent 31.7g/dl 


  


 


 


Red Cell Distribution Width 15.1%  


 


Platelet Count 81008^3/UL  


 


Mean Platelet Volume 10.9fl  


 


Neutrophils % 82.2%  


 


Lymphocytes % 7.9%  


 


Monocytes % 8.5%  


 


Eosinophils % 0.4%  


 


Basophils % 0.8%  


 


Nucleated Red Blood Cells % 0.0/100WBC  


 


Sodium Level 146mmol/L  


 


Potassium Level 3.9mmol/L  


 


Chloride Level 105mmol/L  


 


Carbon Dioxide Level 29mmol/L  


 


Anion Gap 16  


 


Blood Urea Nitrogen 12mg/dl  


 


Creatinine 0.73mg/dl  


 


Glucose Level 128mg/dl  


 


Calcium Level 9.1mg/dl  


 


Troponin I < 0.012ng/ml  


 


Bedside Glucose  138mg/dL 








 Current Medications








 Medications


  (Trade)  Dose


 Ordered  Sig/Graciela


 Route


 PRN Reason  Start Time


 Stop Time Status Last Admin


Dose Admin


 


 Aspirin


  (Aspirin)  325 mg  ONCE  STAT


 PO


   10/29/17 07:34


 10/29/17 07:35 DC 10/29/17 07:44


 


 


 Metoprolol


 Tartrate


  (Lopressor)  5 mg  Q5M


 IV


   10/29/17 08:00


 10/29/17 08:11 DC 10/29/17 08:33


 


 


 Ondansetron HCl


  (Zofran Inj)  4 mg  ER BRIDGE PRN


 IV


 NAUSEA AND/OR VOMITING  10/29/17 09:30


 10/30/17 09:29   


 


 


 Acetaminophen


  (Tylenol Tab)  650 mg  ER BRIDGE PRN


 PO


 MILD PAIN/FEVER  10/29/17 09:30


 10/30/17 09:29   


 











Procedures/MDM


MDM





Patient's presentation warrants further investigation.  We will complete a 

cardiac workup on this patient. Given his significant cardiac history and 

presentation today, I have moderate suspicion for a cardiac pathology.  He is 

currently in A fib with RVR. The patient is also mildly hypertensive. We will 

give him full dose aspirin in the ER. We will also give the patient 5mg IV 

metoprolol every 5 minutes for a maximum of 3 doses to see if we can break the 

rapid ventricular response.





LABS





The patient's blood work was obtained and reviewed.  The patient seemed shows 

no leukocytosis with mild left shift.  The patient is afebrile, and I do not 

suspect a systemic infection.  The patient is not anemic today.  The patient's 

platelet count is unremarkable.  The patient's BMP shows no signs of emergent 

metabolic or electrolyte abnormality.  The patient has normal renal and hepatic 

function testing.  The patient's troponin is negative today.





EKG





EKG read by me: 


Rate/Rhythm: Irregularly irregular rhythm indicating atrial fibrillation, 

tachycardia at 106 that demonstrates rapid ventricular response


Intervals: RI interval not present.  QRS is normal.  QTc does appear widened.


Axis: Left shifted


Impression: No ST or T-wave changes concerning for acute coronary syndrome.  A. 

fib with RVR.





IMAGING





CXR


FINDINGS: The lungs are clear. The heart is enlarged. There is calcification in 

the aorta consistent with atherosclerosis. There is no pleural effusion. There 

is no pneumothorax. 


IMPRESSION: Cardiomegaly and atherosclerosis. Otherwise normal chest radiograph.


Electronically viewed and signed by .Bryan Yoo MD, MD on 10/29/2017 07:39 





TREATMENT/DISPOSITION





The patient's HEART score is 6, which places him at moderate risk and warrants 

an admission for observation for chest pain. The first dose of metoprolol IV 

did bring the patient down to the 90s. He also received ASA in the ER, as 

indicated above.





The patient was accepted by Dr. Mata at 09:10AM on October 29, 2017 as 

indicated by his insurance.





Departure


Diagnosis:  


 Primary Impression:  


 Chest pain


 Chest pain type:  unspecified  Qualified Code:  R07.9 - Chest pain, 

unspecified type


 Additional Impression:  


 Atrial fibrillation with RVR


Condition:  CAIN Olguin MD Oct 29, 2017 07:25

## 2017-10-29 NOTE — RADRPT
PROCEDURE:   XR Chest. 

 

CLINICAL INDICATION:   Chest pain.

 

TECHNIQUE:   Single frontal view. 

 

COMPARISON:   08/17/2017. 

 

FINDINGS:

The lungs are clear. 

The heart is enlarged. There is calcification in the aorta consistent with atherosclerosis. 

There is no pleural effusion. 

There is no pneumothorax.   

 

IMPRESSION:

1.  Cardiomegaly and atherosclerosis.

2.  Otherwise normal chest radiograph.

 

RPTAT: QQ

_____________________________________________ 

.Bryan Yoo MD, MD           Date    Time 

Electronically viewed and signed by .Bryan Yoo MD, MD on 10/29/2017 07:39 

 

D:  10/29/2017 07:39  T:  10/29/2017 07:39

.R/

## 2017-10-29 NOTE — HP
DATE OF ADMISSION: 10/29/2017

 

CHIEF COMPLAINT AND HISTORY OF PRESENT ILLNESS:  The patient is a 77-year-old gentleman who is well 
known to me from previous history of chronic atrial fibrillation and history of hypertrophic obstruc
tive cardiomyopathy on chronic anticoagulation, history of coronary artery disease status post prior
 MI, presenting with substernal chest pain, nonradiating, with some shortness of breath and diaphore
sis which began at 3:00 in the morning, which woke him up from sleep, and the patient was evaluated 
in the emergency room and was admitted.  The patient denies any dizziness or palpitations.  No histo
ry of hematemesis or melena.  Denies any nausea.  No complaints of tingling or numbness of extremiti
es.

 

MEDICATIONS:  Include: 

1.  Eliquis 5 mg p.o. b.i.d.

2.  Aspirin 81 mg p.o. daily.

3.  Carafate 1 g b.i.d.

4.  Pantoprazole 40 mg daily.

5.  Metoprolol tartrate 100 mg b.i.d.

6.  Simvastatin 40 mg p.o. daily.

 

ALLERGIES:  NO KNOWN ALLERGIES.

 

REVIEW OF SYSTEMS:

HEAD:  No history of headaches, focal weakness or numbness.  Prior history of stroke.

EYES:  No blurry vision or glaucoma.  The patient wears glasses.

ENT:  Noncontributory.

NECK:  No history of thyroid disease.

CHEST:  No bronchitis, hay fever or asthma.  The patient does not smoke.

HEART:  History of coronary artery disease status post PTCA of the proximal LAD with a drug-eluting 
stent, history of recurrent Afib, history of hypertrophic obstructive cardiomyopathy.  Lexiscan done
 about 3 months back showed no evidence of perfusion defect.

GASTROINTESTINAL:  History of GERD, esophagitis status post endoscopy about 2 months back.  No histo
ry of cholelithiasis.

GENITOURINARY:  No dysuria, hematuria, kidney stones.

 

PHYSICAL EXAMINATION:

GENERAL:  The patient is a mildly obese _____ built male, presently in no acute distress.

VITAL SIGNS:  Temperature 97.9, blood pressure 158/108, heart rate 114.  Irregularly irregular.  Rhy
thm is Afib.  

HEENT:  No pallor, cyanosis or icterus.  Tongue is coated, dry.

NECK:  Supple.  JVD is not increased.

CHEST:  Clinically clear.

HEART:  S1, S2 heard with no definite gallops.

ABDOMEN:  Soft, obese, nontender, no hepatosplenomegaly.

EXTREMITIES:  No edema.  Pedals poorly palpable.  Homans' sign is negative.

NEUROLOGIC:  No localizing or lateralizing signs.

RECTAL:  Deferred due to patient discomfort.

 

LABORATORY DATA:  Initial WBC count 5.1, hematocrit 50.8, platelet count is 182.  Sodium 146, potass
ium 3.9.  Troponin x2 was negative.

 

IMPRESSION:

1.  Atrial fibrillation with rapid ventricular response.  No evidence of congestive heart failure.

2.  Coronary artery disease, status post myocardial infarction.

3.  History of hypertrophic obstructive cardiomyopathy.

4.  Obesity.

5.  Status post prior cerebrovascular accident.

6.  History of gastroesophageal reflux disease.

7.  Chronic leukopenia.  No evidence of myelodysplasia.

 

PLAN:  Will admit the patient to telemetry.  Repeat troponin in a.m.  Optimize meds for Afib.  Monit
or for congestive heart failure.  Obtain cardiac consultation with Dr. Lemus if necessary in the 
a.m.

 

 

Dictated By: ASAD VAUGHN MD

 

SR/RENE

DD:    10/29/2017 14:59:10

DT:    10/29/2017 22:45:38

Conf#: [id number]

DID#:  [id number]

## 2017-10-30 VITALS — HEART RATE: 102 BPM

## 2017-10-30 VITALS — SYSTOLIC BLOOD PRESSURE: 133 MMHG | DIASTOLIC BLOOD PRESSURE: 88 MMHG | RESPIRATION RATE: 20 BRPM

## 2017-10-30 VITALS — RESPIRATION RATE: 17 BRPM | DIASTOLIC BLOOD PRESSURE: 72 MMHG | SYSTOLIC BLOOD PRESSURE: 133 MMHG

## 2017-10-30 VITALS — SYSTOLIC BLOOD PRESSURE: 131 MMHG | DIASTOLIC BLOOD PRESSURE: 89 MMHG | RESPIRATION RATE: 17 BRPM

## 2017-10-30 VITALS — RESPIRATION RATE: 20 BRPM | SYSTOLIC BLOOD PRESSURE: 141 MMHG | DIASTOLIC BLOOD PRESSURE: 91 MMHG

## 2017-10-30 VITALS — HEART RATE: 92 BPM

## 2017-10-30 VITALS — DIASTOLIC BLOOD PRESSURE: 95 MMHG | SYSTOLIC BLOOD PRESSURE: 135 MMHG | RESPIRATION RATE: 17 BRPM

## 2017-10-30 VITALS — HEART RATE: 108 BPM | DIASTOLIC BLOOD PRESSURE: 98 MMHG | SYSTOLIC BLOOD PRESSURE: 168 MMHG | RESPIRATION RATE: 20 BRPM

## 2017-10-30 VITALS — DIASTOLIC BLOOD PRESSURE: 84 MMHG | RESPIRATION RATE: 18 BRPM | SYSTOLIC BLOOD PRESSURE: 145 MMHG

## 2017-10-30 VITALS — DIASTOLIC BLOOD PRESSURE: 86 MMHG | RESPIRATION RATE: 18 BRPM | SYSTOLIC BLOOD PRESSURE: 125 MMHG

## 2017-10-30 VITALS — HEART RATE: 104 BPM

## 2017-10-30 VITALS — SYSTOLIC BLOOD PRESSURE: 144 MMHG | DIASTOLIC BLOOD PRESSURE: 99 MMHG | RESPIRATION RATE: 20 BRPM

## 2017-10-30 VITALS — RESPIRATION RATE: 18 BRPM | SYSTOLIC BLOOD PRESSURE: 163 MMHG | DIASTOLIC BLOOD PRESSURE: 93 MMHG

## 2017-10-30 VITALS — RESPIRATION RATE: 17 BRPM | SYSTOLIC BLOOD PRESSURE: 159 MMHG | DIASTOLIC BLOOD PRESSURE: 87 MMHG

## 2017-10-30 VITALS — HEART RATE: 94 BPM

## 2017-10-30 VITALS — HEART RATE: 103 BPM

## 2017-10-30 LAB
ABNORMAL IP MESSAGE: 1
ALBUMIN SERPL-MCNC: 3.6 G/DL (ref 3.3–4.9)
ALBUMIN/GLOB SERPL: 0.94 {RATIO}
ALP SERPL-CCNC: 70 IU/L (ref 42–121)
ALT SERPL-CCNC: 46 IU/L (ref 13–69)
ANION GAP SERPL CALC-SCNC: 12 MMOL/L (ref 8–16)
AST SERPL-CCNC: 61 IU/L (ref 15–46)
BASOPHILS # BLD AUTO: 0 10^3/UL (ref 0–0.1)
BASOPHILS NFR BLD: 0.6 % (ref 0–2)
BILIRUB DIRECT SERPL-MCNC: 0 MG/DL (ref 0–0.2)
BILIRUB SERPL-MCNC: 2.8 MG/DL (ref 0.2–1.3)
BUN SERPL-MCNC: 15 MG/DL (ref 7–20)
CALCIUM SERPL-MCNC: 8.9 MG/DL (ref 8.4–10.2)
CHLORIDE SERPL-SCNC: 104 MMOL/L (ref 97–110)
CO2 SERPL-SCNC: 30 MMOL/L (ref 21–31)
CREAT SERPL-MCNC: 0.74 MG/DL (ref 0.61–1.24)
EOSINOPHIL # BLD: 0 10^3/UL (ref 0–0.5)
EOSINOPHIL NFR BLD: 0.6 % (ref 0–7)
ERYTHROCYTE [DISTWIDTH] IN BLOOD BY AUTOMATED COUNT: 15 % (ref 11.5–14.5)
GLOBULIN SER-MCNC: 3.8 G/DL (ref 1.3–3.2)
GLUCOSE SERPL-MCNC: 89 MG/DL (ref 70–220)
HCT VFR BLD CALC: 47.6 % (ref 42–52)
HGB BLD-MCNC: 15.3 G/DL (ref 14–18)
LYMPHOCYTES # BLD AUTO: 0.4 10^3/UL (ref 0.8–2.9)
LYMPHOCYTES NFR BLD AUTO: 10.7 % (ref 15–51)
MAGNESIUM SERPL-MCNC: 1.9 MG/DL (ref 1.7–2.5)
MCH RBC QN AUTO: 30.5 PG (ref 29–33)
MCHC RBC AUTO-ENTMCNC: 32.1 G/DL (ref 32–37)
MCV RBC AUTO: 95 FL (ref 82–101)
MONOCYTES # BLD: 0.4 10^3/UL (ref 0.3–0.9)
MONOCYTES NFR BLD: 11 % (ref 0–11)
NEUTROPHILS # BLD: 2.5 10^3/UL (ref 1.6–7.5)
NEUTROPHILS NFR BLD AUTO: 76.5 % (ref 39–77)
NRBC # BLD MANUAL: 0 10^3/UL (ref 0–0)
NRBC BLD AUTO-RTO: 0 /100WBC (ref 0–0)
PLATELET # BLD: 135 10^3/UL (ref 140–415)
PMV BLD AUTO: 10.9 FL (ref 7.4–10.4)
POSITIVE DIFF: (no result)
POTASSIUM SERPL-SCNC: 4 MMOL/L (ref 3.5–5.1)
PROT SERPL-MCNC: 7.4 G/DL (ref 6.1–8.1)
RBC # BLD AUTO: 5.01 10^6/UL (ref 4.7–6.1)
SODIUM SERPL-SCNC: 142 MMOL/L (ref 135–144)
WBC # BLD AUTO: 3.3 10^3/UL (ref 4.8–10.8)

## 2017-10-30 RX ADMIN — SUCRALFATE SCH GM: 1 TABLET ORAL at 20:58

## 2017-10-30 RX ADMIN — APIXABAN SCH MG: 5 TABLET, FILM COATED ORAL at 09:45

## 2017-10-30 RX ADMIN — APIXABAN SCH MG: 5 TABLET, FILM COATED ORAL at 20:58

## 2017-10-30 RX ADMIN — PANTOPRAZOLE SODIUM SCH MG: 40 TABLET, DELAYED RELEASE ORAL at 06:06

## 2017-10-30 RX ADMIN — ATORVASTATIN CALCIUM SCH MG: 20 TABLET, FILM COATED ORAL at 20:58

## 2017-10-30 RX ADMIN — METOPROLOL TARTRATE SCH MG: 100 TABLET, FILM COATED ORAL at 09:36

## 2017-10-30 RX ADMIN — BUSPIRONE HYDROCHLORIDE SCH MG: 5 TABLET ORAL at 09:33

## 2017-10-30 RX ADMIN — METOPROLOL TARTRATE SCH MG: 100 TABLET, FILM COATED ORAL at 20:59

## 2017-10-30 RX ADMIN — ASPIRIN SCH MG: 81 TABLET, COATED ORAL at 09:31

## 2017-10-30 RX ADMIN — SUCRALFATE SCH GM: 1 TABLET ORAL at 09:36

## 2017-10-30 RX ADMIN — BUSPIRONE HYDROCHLORIDE SCH MG: 5 TABLET ORAL at 20:58

## 2017-10-31 VITALS — HEART RATE: 89 BPM

## 2017-10-31 VITALS — SYSTOLIC BLOOD PRESSURE: 142 MMHG | DIASTOLIC BLOOD PRESSURE: 88 MMHG | RESPIRATION RATE: 17 BRPM

## 2017-10-31 VITALS — HEART RATE: 98 BPM

## 2017-10-31 LAB
ABNORMAL IP MESSAGE: 1
ANION GAP SERPL CALC-SCNC: 18 MMOL/L (ref 8–16)
BASOPHILS # BLD AUTO: 0 10^3/UL (ref 0–0.1)
BASOPHILS NFR BLD: 0.9 % (ref 0–2)
BUN SERPL-MCNC: 15 MG/DL (ref 7–20)
CALCIUM SERPL-MCNC: 8.6 MG/DL (ref 8.4–10.2)
CHLORIDE SERPL-SCNC: 105 MMOL/L (ref 97–110)
CO2 SERPL-SCNC: 26 MMOL/L (ref 21–31)
CREAT SERPL-MCNC: 0.71 MG/DL (ref 0.61–1.24)
EOSINOPHIL # BLD: 0 10^3/UL (ref 0–0.5)
EOSINOPHIL NFR BLD: 0.9 % (ref 0–7)
ERYTHROCYTE [DISTWIDTH] IN BLOOD BY AUTOMATED COUNT: 15.2 % (ref 11.5–14.5)
GLUCOSE SERPL-MCNC: 82 MG/DL (ref 70–220)
HCT VFR BLD CALC: 49.9 % (ref 42–52)
HGB BLD-MCNC: 15.6 G/DL (ref 14–18)
LYMPHOCYTES # BLD AUTO: 0.5 10^3/UL (ref 0.8–2.9)
LYMPHOCYTES NFR BLD AUTO: 13.1 % (ref 15–51)
MAGNESIUM SERPL-MCNC: 2 MG/DL (ref 1.7–2.5)
MCH RBC QN AUTO: 30.1 PG (ref 29–33)
MCHC RBC AUTO-ENTMCNC: 31.3 G/DL (ref 32–37)
MCV RBC AUTO: 96.3 FL (ref 82–101)
MONOCYTES # BLD: 0.4 10^3/UL (ref 0.3–0.9)
MONOCYTES NFR BLD: 11.6 % (ref 0–11)
NEUTROPHILS # BLD: 2.6 10^3/UL (ref 1.6–7.5)
NEUTROPHILS NFR BLD AUTO: 72.9 % (ref 39–77)
NRBC # BLD MANUAL: 0 10^3/UL (ref 0–0)
NRBC BLD AUTO-RTO: 0 /100WBC (ref 0–0)
PLATELET # BLD: 142 10^3/UL (ref 140–415)
PMV BLD AUTO: 11.9 FL (ref 7.4–10.4)
POSITIVE DIFF: (no result)
POTASSIUM SERPL-SCNC: 4.5 MMOL/L (ref 3.5–5.1)
RBC # BLD AUTO: 5.18 10^6/UL (ref 4.7–6.1)
SODIUM SERPL-SCNC: 144 MMOL/L (ref 135–144)
WBC # BLD AUTO: 3.5 10^3/UL (ref 4.8–10.8)

## 2017-10-31 RX ADMIN — APIXABAN SCH MG: 5 TABLET, FILM COATED ORAL at 09:08

## 2017-10-31 RX ADMIN — PANTOPRAZOLE SODIUM SCH MG: 40 TABLET, DELAYED RELEASE ORAL at 06:12

## 2017-10-31 RX ADMIN — BUSPIRONE HYDROCHLORIDE SCH MG: 5 TABLET ORAL at 09:08

## 2017-10-31 RX ADMIN — SUCRALFATE SCH GM: 1 TABLET ORAL at 09:08

## 2017-10-31 RX ADMIN — ASPIRIN SCH MG: 81 TABLET, COATED ORAL at 09:08

## 2017-10-31 RX ADMIN — METOPROLOL TARTRATE SCH MG: 100 TABLET, FILM COATED ORAL at 09:09

## 2017-10-31 NOTE — DS
DATE OF ADMISSION: 10/29/2017

DATE OF DISCHARGE: 10/31/2017

 

FINAL DIAGNOSES:

1.  Atrial fibrillation with rapid ventricular response with no evidence of congestive heart failure
.  

2.  Atypical chest pain.  No evidence of acute coronary syndrome.

3.  Gastroesophageal reflux disease.

4.  Status post cerebrovascular accident.

5.  Hypertrophic obstructive cardiomyopathy.

6.  Chronic leukopenia, no evidence of myelodysplasia.

 

HOSPITAL COURSE:  Patient is a 77-year-old gentleman well known to me from previous followup, with h
istory of chronic atrial fibrillation with hypertrophic obstructive cardiomyopathy on chronic antico
agulation, history of coronary artery disease presenting with substernal chest pain, nonradiating, w
ith some shortness of breath and diaphoresis.  The patient was evaluated in the emergency room, jaime loera to be in atrial fibrillation with rapid ventricular response.  There was no clinical evidence of h
eart failure.  Troponin x2 was negative.  The patient was managed with beta blockers and troponin x3
 was negative.  The patient became asymptomatic of chest pain.  His white blood cell count was 3.3 o
n 10/30/2017 which moved up to 3.5 on 10/31/2017, platelet count 142,000.  The patient was ambulatin
g well without any symptoms.  Heart rate in the 90s.  The patient was discharged home in much improv
ed condition to continue all of his prior medications, namely:  

1.  Metoprolol 100 mg b.i.d.

2.  Buspirone 5 mg b.i.d.

3.  Atorvastatin 20 mg every day.

4.  Aspirin 81 mg every day.

5.  Eliquis 5 mg b.i.d.

6.  Pantoprazole 40 mg every day.

7.  Sucralfate 1 gram p.o. b.i.d.  

 

The patient will follow up in the office in 2 weeks.

 

DISCHARGE CONDITION:  Much improved.

 

 

Dictated By: ASAD VAUGHN MD, SR/RENE

DD:    10/31/2017 10:10:21

DT:    10/31/2017 11:31:09

Conf#: [id number]

DID#:  [id number]

## 2017-10-31 NOTE — PN
DATE:  10/30/2017

 

 

SUBJECTIVE:  The patient has occasional bouts of anxiety.  Denies any chest pain or shortness of robel
ath.

 

PHYSICAL EXAMINATION:

GENERAL:  The patient is afebrile.  

VITAL SIGNS: Temperature 98.2, blood pressure 134/95, heart rate 100 and irregular.

NECK:  JVD is not increased.

LUNGS:  Clinically clear.

HEART:  S1, S2 heard with no definite gallops.

ABDOMEN:  Obese, nontender, no hepatosplenomegaly.

EXTREMITIES:  No edema.  Homans sign is negative.

 

LABORATORY DATA:  WBC count 3.3, platelet count 135,000.  Sodium 140, potassium 4.0.  Magnesium 1.9.
  Troponin negative x3.

 

IMPRESSION:

1.  Atrial fibrillation with rapid ventricular response on admission yesterday, presently is rate co
ntrolled, with no evidence of congestive heart failure.

2.  Coronary artery disease, status post prior myocardial infarction.

3.  History of hypertrophic obstructive cardiomyopathy.

4.  Obesity.

5.  Gastroesophageal reflux disease.

6.  Leukopenia with thrombocytopenia.

 

PLAN:  Increase activity.  Observe over 24 hours and repeat CBC, BMP in a.m.  If stable over the nex
t 24 hours, discharge tomorrow with outpatient cardiac followup.

 

 

Dictated By: ASAD VAUGHN MD, SR/RENE

DD:    10/30/2017 13:56:13

DT:    10/30/2017 17:19:19

Conf#: [id number]

DID#:  [id number]

## 2017-10-31 NOTE — RADRPT
Vent Rate: 93 bpm

RR Interval: 0 msec

N[place] Interval: 0 msec

QRS Duration: 128 msec

QT Interval: 414 msec

QTC Interval: 514 msec

P-R-T Axis: 0 - -36 - 99 degrees

 

 Atrial fibrillation with a competing junctional pacemaker with 

premature ventricular or aberrantly conducted complexes

 Left axis deviation

 Left ventricular hypertrophy with QRS widening

 T wave abnormality, consider lateral ischemia or digitalis effect

 Abnormal ECG

 

Electronically Signed By: Segundo Baldwin

[id number]

## 2017-12-15 ENCOUNTER — HOSPITAL ENCOUNTER (INPATIENT)
Dept: HOSPITAL 10 - E/R | Age: 77
LOS: 2 days | Discharge: HOME | DRG: 310 | End: 2017-12-17
Attending: INTERNAL MEDICINE | Admitting: INTERNAL MEDICINE
Payer: MEDICARE

## 2017-12-15 VITALS
BODY MASS INDEX: 27.76 KG/M2 | WEIGHT: 187.39 LBS | HEIGHT: 69 IN | WEIGHT: 187.39 LBS | BODY MASS INDEX: 27.76 KG/M2 | HEIGHT: 69 IN

## 2017-12-15 VITALS — SYSTOLIC BLOOD PRESSURE: 141 MMHG | DIASTOLIC BLOOD PRESSURE: 81 MMHG | RESPIRATION RATE: 20 BRPM

## 2017-12-15 VITALS — SYSTOLIC BLOOD PRESSURE: 158 MMHG | RESPIRATION RATE: 19 BRPM | DIASTOLIC BLOOD PRESSURE: 99 MMHG

## 2017-12-15 VITALS — HEART RATE: 110 BPM

## 2017-12-15 VITALS — HEART RATE: 97 BPM

## 2017-12-15 DIAGNOSIS — D72.819: ICD-10-CM

## 2017-12-15 DIAGNOSIS — F41.9: ICD-10-CM

## 2017-12-15 DIAGNOSIS — Z79.02: ICD-10-CM

## 2017-12-15 DIAGNOSIS — R14.2: ICD-10-CM

## 2017-12-15 DIAGNOSIS — K21.9: ICD-10-CM

## 2017-12-15 DIAGNOSIS — K29.70: ICD-10-CM

## 2017-12-15 DIAGNOSIS — R14.0: ICD-10-CM

## 2017-12-15 DIAGNOSIS — I35.0: ICD-10-CM

## 2017-12-15 DIAGNOSIS — I10: ICD-10-CM

## 2017-12-15 DIAGNOSIS — Z86.73: ICD-10-CM

## 2017-12-15 DIAGNOSIS — D69.6: ICD-10-CM

## 2017-12-15 DIAGNOSIS — I48.2: Primary | ICD-10-CM

## 2017-12-15 DIAGNOSIS — Z79.82: ICD-10-CM

## 2017-12-15 DIAGNOSIS — I42.1: ICD-10-CM

## 2017-12-15 DIAGNOSIS — Z95.5: ICD-10-CM

## 2017-12-15 DIAGNOSIS — R07.89: ICD-10-CM

## 2017-12-15 LAB
ABNORMAL IP MESSAGE: 1
ALBUMIN SERPL-MCNC: 3.9 G/DL (ref 3.3–4.9)
ALBUMIN/GLOB SERPL: 1.18 {RATIO}
ALP SERPL-CCNC: 82 IU/L (ref 42–121)
ALT SERPL-CCNC: 45 IU/L (ref 13–69)
ANION GAP SERPL CALC-SCNC: 16 MMOL/L (ref 8–16)
AST SERPL-CCNC: 56 IU/L (ref 15–46)
BASOPHILS # BLD AUTO: 0 10^3/UL (ref 0–0.1)
BASOPHILS NFR BLD: 0.8 % (ref 0–2)
BILIRUB DIRECT SERPL-MCNC: 0 MG/DL (ref 0–0.2)
BILIRUB SERPL-MCNC: 1.7 MG/DL (ref 0.2–1.3)
BNP SERPL-MCNC: 2960 PG/ML (ref 0–450)
BUN SERPL-MCNC: 11 MG/DL (ref 7–20)
CALCIUM SERPL-MCNC: 8.8 MG/DL (ref 8.4–10.2)
CHLORIDE SERPL-SCNC: 104 MMOL/L (ref 97–110)
CK MB SERPL-MCNC: 1.25 NG/ML (ref 0–2.4)
CK MB SERPL-MCNC: 1.6 NG/ML (ref 0–2.4)
CK SERPL-CCNC: 48 IU/L (ref 23–200)
CK SERPL-CCNC: 71 IU/L (ref 23–200)
CO2 SERPL-SCNC: 25 MMOL/L (ref 21–31)
CREAT SERPL-MCNC: 0.68 MG/DL (ref 0.61–1.24)
EOSINOPHIL # BLD: 0 10^3/UL (ref 0–0.5)
EOSINOPHIL NFR BLD: 0.3 % (ref 0–7)
ERYTHROCYTE [DISTWIDTH] IN BLOOD BY AUTOMATED COUNT: 14.3 % (ref 11.5–14.5)
GLOBULIN SER-MCNC: 3.3 G/DL (ref 1.3–3.2)
GLUCOSE SERPL-MCNC: 133 MG/DL (ref 70–220)
HCT VFR BLD CALC: 46.6 % (ref 42–52)
HGB BLD-MCNC: 15.7 G/DL (ref 14–18)
LYMPHOCYTES # BLD AUTO: 0.2 10^3/UL (ref 0.8–2.9)
LYMPHOCYTES NFR BLD AUTO: 6.4 % (ref 15–51)
MCH RBC QN AUTO: 30.8 PG (ref 29–33)
MCHC RBC AUTO-ENTMCNC: 33.7 G/DL (ref 32–37)
MCV RBC AUTO: 91.4 FL (ref 82–101)
MONOCYTES # BLD: 0.3 10^3/UL (ref 0.3–0.9)
MONOCYTES NFR BLD: 8.1 % (ref 0–11)
NEUTROPHILS # BLD: 3 10^3/UL (ref 1.6–7.5)
NEUTROPHILS NFR BLD AUTO: 83.8 % (ref 39–77)
NRBC # BLD MANUAL: 0 10^3/UL (ref 0–0)
NRBC BLD AUTO-RTO: 0 /100WBC (ref 0–0)
PLATELET # BLD: 117 10^3/UL (ref 140–415)
PMV BLD AUTO: 10.7 FL (ref 7.4–10.4)
POSITIVE DIFF: (no result)
POTASSIUM SERPL-SCNC: 3.5 MMOL/L (ref 3.5–5.1)
PROT SERPL-MCNC: 7.2 G/DL (ref 6.1–8.1)
RBC # BLD AUTO: 5.1 10^6/UL (ref 4.7–6.1)
SODIUM SERPL-SCNC: 141 MMOL/L (ref 135–144)
TROPONIN I SERPL-MCNC: < 0.012 NG/ML (ref 0–0.12)
WBC # BLD AUTO: 3.6 10^3/UL (ref 4.8–10.8)

## 2017-12-15 PROCEDURE — 71010: CPT

## 2017-12-15 PROCEDURE — 80061 LIPID PANEL: CPT

## 2017-12-15 PROCEDURE — 84439 ASSAY OF FREE THYROXINE: CPT

## 2017-12-15 PROCEDURE — 85025 COMPLETE CBC W/AUTO DIFF WBC: CPT

## 2017-12-15 PROCEDURE — 93005 ELECTROCARDIOGRAM TRACING: CPT

## 2017-12-15 PROCEDURE — 84100 ASSAY OF PHOSPHORUS: CPT

## 2017-12-15 PROCEDURE — 82553 CREATINE MB FRACTION: CPT

## 2017-12-15 PROCEDURE — 82550 ASSAY OF CK (CPK): CPT

## 2017-12-15 PROCEDURE — 96374 THER/PROPH/DIAG INJ IV PUSH: CPT

## 2017-12-15 PROCEDURE — 83735 ASSAY OF MAGNESIUM: CPT

## 2017-12-15 PROCEDURE — 83690 ASSAY OF LIPASE: CPT

## 2017-12-15 PROCEDURE — 80048 BASIC METABOLIC PNL TOTAL CA: CPT

## 2017-12-15 PROCEDURE — 96376 TX/PRO/DX INJ SAME DRUG ADON: CPT

## 2017-12-15 PROCEDURE — 80076 HEPATIC FUNCTION PANEL: CPT

## 2017-12-15 PROCEDURE — 84484 ASSAY OF TROPONIN QUANT: CPT

## 2017-12-15 PROCEDURE — 82150 ASSAY OF AMYLASE: CPT

## 2017-12-15 PROCEDURE — 96375 TX/PRO/DX INJ NEW DRUG ADDON: CPT

## 2017-12-15 PROCEDURE — 36415 COLL VENOUS BLD VENIPUNCTURE: CPT

## 2017-12-15 PROCEDURE — 83880 ASSAY OF NATRIURETIC PEPTIDE: CPT

## 2017-12-15 PROCEDURE — 84443 ASSAY THYROID STIM HORMONE: CPT

## 2017-12-15 PROCEDURE — 80053 COMPREHEN METABOLIC PANEL: CPT

## 2017-12-15 RX ADMIN — METOPROLOL TARTRATE SCH MG: 100 TABLET, FILM COATED ORAL at 22:02

## 2017-12-15 RX ADMIN — BUSPIRONE HYDROCHLORIDE SCH MG: 5 TABLET ORAL at 22:01

## 2017-12-15 RX ADMIN — APIXABAN SCH MG: 5 TABLET, FILM COATED ORAL at 22:01

## 2017-12-15 RX ADMIN — SUCRALFATE SCH GM: 1 TABLET ORAL at 22:01

## 2017-12-15 RX ADMIN — DILTIAZEM HYDROCHLORIDE SCH MG: 120 CAPSULE, EXTENDED RELEASE ORAL at 19:05

## 2017-12-15 NOTE — ERD
ER Documentation


Chief Complaint


Chief Complaint


Complains of abdominal and epigastric pain x 3 days





HPI


77-year-old male with a history of coronary artery disease status post PCI of 

the LAD 2016, hypertrophic cardiomyopathy, hypertension and chronic 

atrial fibrillation anticoagulated on Eliquis presents the ED planing of acute 

onset of chest pain, shortness of breath and nausea since this morning.  

Patient was walking at home when he developed acute onset of moderate to severe

, nonradiating, pressure-like, substernal, nonradiating chest pain with 

shortness of breath and nausea no vomiting or diaphoresis.  No relieving or 

exacerbating factors.  Denies palpitations.  Complains of chronic, generalized, 

abdominal pain which is unchanged.  No hematemesis, hematochezia or melanotic 

stools.  No URI symptoms or cough.  Denies headache, visual changes, focal 

weakness or numbness.  No fevers or chills.





ROS


All systems reviewed and are negative except as per history of present illness.





Medications


Home Meds


Active Scripts


Apixaban* (Eliquis*) 5 Mg Tablet, 5 MG PO BID for 30 Days, #60 TAB


   Prov:ASAD MATA MD         17


Aspirin* (Aspirin* EC) 81 Mg Tablet.dr, 81 MG PO DAILY for 30 Days, #30


   Prov:ASAD MATA MD         6/3/17


Reported Medications


Buspirone Hcl* (Buspar*) 5 Mg Tab, 5 MG PO BID, TAB


   10/29/17


Sucralfate* (Carafate*) 1 Gm Tab, 1 GM PO BID, TAB


   17


Pantoprazole* (Protonix*) 40 Mg Tablet.dr, 40 MG PO DAILY, TAB


   17


Metoprolol Tartrate* (Lopressor*) 100 Mg Tablet, 100 MG PO BID, #60 TAB


   17


Simvastatin* (Zocor*) 40 Mg Tablet, 40 MG PO QHS, #30 TAB


   17





Allergies


Allergies:  


Coded Allergies:  


     No Known Allergies (Verified  Allergy, Unknown, 10/29/17)





PMhx/Soc


Reviewed in chart. As per HPI.


History of Surgery:  No


Anesthesia Reaction:  No


Hx Neurological Disorder:  Yes (CVA)


Hx Respiratory Disorders:  No


Hx Cardiac Disorders:  Yes (MI, CAD, afib, HLD)


Hx Psychiatric Problems:  No


Hx Miscellaneous Medical Probl:  No


Hx Alcohol Use:  No


Hx Substance Use:  No


Hx Tobacco Use:  Yes





FmHx


No family history relevant to presenting complaint.





Physical Exam


Vitals





Vital Signs








  Date Time  Temp Pulse Resp B/P Pulse Ox O2 Delivery O2 Flow Rate FiO2


 


12/15/17 11:52  102 18 160/91    


 


12/15/17 09:38      Nasal Cannula  


 


12/15/17 08:51 97.6 116 20 158/105 99   








Physical Exam


Const:    Alert, ill appearing, moderate distress


Head:   Atraumatic 


Eyes:    Normal Conjunctiva


ENT:    Normal External Ears, Nose and Mouth.


Neck:               Full range of motion. No JVD. Nontender


Resp:    Clear to auscultation bilaterally


Cardio:    Tachycardic. Irregular rate and rhythm, no murmurs


Abd:    Soft, obese, non distended. Normal bowel sounds. Mild generalized 

tenderness. No rebound or guarding


Skin:    No petechiae or rashes


Back:    No midline or flank tenderness


Ext:    No cyanosis, 1+ edema


Neur:    Awake and alert. No focal deficit observed.


Psych:    Cooperative, anxious


Result Diagram:  


12/15/17 0935                                                                  

              12/15/17 0935





Results 24 hrs





 Laboratory Tests








Test


  12/15/17


09:35


 


White Blood Count 3.610^3/ul 


 


Red Blood Count 5.1010^6/ul 


 


Hemoglobin 15.7g/dl 


 


Hematocrit 46.6% 


 


Mean Corpuscular Volume 91.4fl 


 


Mean Corpuscular Hemoglobin 30.8pg 


 


Mean Corpuscular Hemoglobin


Concent 33.7g/dl 


 


 


Red Cell Distribution Width 14.3% 


 


Platelet Count 97373^3/UL 


 


Mean Platelet Volume 10.7fl 


 


Neutrophils % 83.8% 


 


Lymphocytes % 6.4% 


 


Monocytes % 8.1% 


 


Eosinophils % 0.3% 


 


Basophils % 0.8% 


 


Nucleated Red Blood Cells % 0.0/100WBC 


 


Neutrophils # 3.010^3/ul 


 


Lymphocytes # 0.210^3/ul 


 


Monocytes # 0.310^3/ul 


 


Eosinophils # 0.010^3/ul 


 


Basophils # 0.010^3/ul 


 


Nucleated Red Blood Cells # 0.010^3/ul 


 


Sodium Level 141mmol/L 


 


Potassium Level 3.5mmol/L 


 


Chloride Level 104mmol/L 


 


Carbon Dioxide Level 25mmol/L 


 


Anion Gap 16 


 


Blood Urea Nitrogen 11mg/dl 


 


Creatinine 0.68mg/dl 


 


Glucose Level 133mg/dl 


 


Calcium Level 8.8mg/dl 


 


Total Bilirubin 1.7mg/dl 


 


Direct Bilirubin 0.00mg/dl 


 


Indirect Bilirubin 1.7mg/dl 


 


Aspartate Amino Transf


(AST/SGOT) 56IU/L 


 


 


Alanine Aminotransferase


(ALT/SGPT) 45IU/L 


 


 


Alkaline Phosphatase 82IU/L 


 


Troponin I < 0.012ng/ml 


 


B-Type Natriuretic Peptide 2960PG/ML 


 


Total Protein 7.2g/dl 


 


Albumin 3.9g/dl 


 


Globulin 3.30g/dl 


 


Albumin/Globulin Ratio 1.18 








 Current Medications








 Medications


  (Trade)  Dose


 Ordered  Sig/Graciela


 Route


 PRN Reason  Start Time


 Stop Time Status Last Admin


Dose Admin


 


 Aspirin


  (Aspirin)  325 mg  ONCE  STAT


 PO


   12/15/17 09:22


 12/15/17 09:25 DC 12/15/17 09:40


 


 


 Metoprolol


 Tartrate


  (Lopressor)  5 mg  ONCE  ONCE


 IV


   12/15/17 09:30


 12/15/17 09:31 DC 12/15/17 09:40


 


 


 Nitroglycerin


  (Nitroglycerin


  (Sl Tab) 0.4 Mg)  1 tab  Q5M UP TO 3 DOSES PRN


 SL


 CHEST PAIN  12/15/17 09:30


    12/15/17 09:40


 


 


 Ondansetron HCl


  (Zofran Inj)  4 mg  ONCE  STAT


 IV


   12/15/17 10:10


 12/15/17 10:12 DC 12/15/17 10:18


 


 


 Metoprolol


 Tartrate


  (Lopressor)  5 mg  ONCE  ONCE


 IV


   12/15/17 11:30


 12/15/17 11:31 DC 12/15/17 11:17


 











LABS: Mild leukopenia and thrombocytopenia.  Elevated BNP.  Labs otherwise 

unremarkable.





EKG: TIME: 09: 16.  Atrial fibrillation with RVR.  Ventricular rate 102.  LVH.  

Nonspecific T-wave changes but no acute ST segment elevation or depression.  EP 

Interpretation: Abnormal EKG. 








IMAGING:





PROCEDURE:   Chest x-ray 


 


CLINICAL INDICATION:   Shortness of breath


 


TECHNIQUE:   Chest single view


 


COMPARISON:   10/09/2017 


 


FINDINGS:


There is stable mild cardiomegaly and an sclerotic aortic calcification. 

Pulmonary vessels are normal in caliber. As before there are bilateral 

calcified pleural plaques. No acute infiltrates are seen. Costophrenic angles 

sharp.


 


IMPRESSION:


 


No acute cardiopulmonary disease. 


Stable mild cardiomegaly and an sclerotic aortic calcification


Calcified bilateral pleural plaques suggest prior asbestos exposure


 


RPTAT: 


_____________________________________________ 


.Deangelo Jenkins MD, MD           Date    Time 


Electronically viewed and signed by .Deangelo Jenkins MD, MD on 12/15/2017 09:48 


 


D:  12/15/2017 09:48  T:  12/15/2017 09:48


.W/





Procedures/MDM


DOCUMENTS REVIEWED:  ED nurse, prior ED, prior records including most recent 

admission for abdominal pain operative report from 2016 cardiac cath with 

stenting of the LAD.








REEXAMINATION/REEVALUATION: 


Time: 11:15.  Chest pain improved however blood pressure is 150/100 and monitor 

reveals ongoing atrial fibrillation with RVR at a ventricular rate of 105-110.  

Repeat metoprolol 5 mg IV ordered.


Time: 12:30. No change with metoprolol. Cardizem 10mg IV given. Adequate rate 

control achieved with ventricular rate down to 85.





MEDICAL DECISION MAKIN-year-old male with a history of coronary artery 

disease status post PCI of the LAD 2016, hypertrophic cardiomyopathy, 

hypertension and chronic atrial fibrillation anticoagulated on Eliquis presents 

the ED complaining of acute onset of chest pain, shortness of breath and nausea 

since this morning.  Patient atrial fibrillation with RVR. Minimal response to 

with metoprolol but improved with Cardizem.  Hypertensive urgency.  No CHF. 

Chest pain likely related to palpitations as no acute ischemic EKG changes or 

elevated troponin however high risk for ACS as known coronary artery disease 

and previous PCI.  Chronic abdominal pain of unknown etiology. Recent extensive 

workup was negative. At risk for mesenteric ischemia. Patient will be admitted 

to telemetry for cardiology consultation, further evaluation and management. 








CRITICAL CARE TIME: Due to the high probability of sudden clinically 

significant cardiovascular and hemodynamic deterioration, this patient with 

chest pain, atrial fib with rapid ventricular response required multiple, 

frequent reevaluations of vital signs and response to therapy. Additional 

critical care time was spent in interpretation of relevant clinical data, 

extensive review of previous medical records and consultation with the 

admitting physician Dr. Mata. 


TOTAL CRITICAL CARE TIME: 40 minutes not including other separately reportable 

procedures.








Counseled patient regarding diagnosis, diagnostic results and plan for 

admission. 








CALLS/CONSULTS: Time 11:15, Dr. Mata, agrees with plan for telemetry 

admission


PATIENT CARE TRANSITIONED:  Time 11:25, Dr. Mata





Departure


Diagnosis:  


 Primary Impression:  


 Atrial fibrillation with rapid ventricular response


 Additional Impressions:  


 Chest pain


 Chest pain type:  unspecified  Qualified Code:  R07.9 - Chest pain, 

unspecified type


 Hypertensive urgency


Condition:  Serious











ADDISON BERNAL MD Dec 15, 2017 09:28

## 2017-12-15 NOTE — RADRPT
PROCEDURE:   Chest x-ray 

 

CLINICAL INDICATION:   Shortness of breath

 

TECHNIQUE:   Chest single view

 

COMPARISON:   10/09/2017 

 

FINDINGS:

There is stable mild cardiomegaly and an sclerotic aortic calcification. Pulmonary vessels are enedina
l in caliber. As before there are bilateral calcified pleural plaques. No acute infiltrates are seen
. Costophrenic angles sharp.

 

IMPRESSION:

 

No acute cardiopulmonary disease. 

Stable mild cardiomegaly and an sclerotic aortic calcification

Calcified bilateral pleural plaques suggest prior asbestos exposure

 

RPTAT: HH

_____________________________________________ 

.Deangelo Jenkins MD, MD           Date    Time 

Electronically viewed and signed by .Deangelo Jenkins MD, MD on 12/15/2017 09:48 

 

D:  12/15/2017 09:48  T:  12/15/2017 09:48

.W/

## 2017-12-15 NOTE — CONS
Date/Time of Note


Date/Time of Note


DATE: 12/15/17 


TIME: 14:47





Assessment/Plan


Assessment/Plan


Chief Complaint/Hosp Course


1.  chest pain- atypical,initial trop negative, ekg without acute changes, cont 

serial trop, hr controll. 





- cont asa 81mg daily. 


- cont statin


- cont bb


- serial trop/ekg





2.  Atrial fibrillation.  Chronic, nonvalvular.  The patient on Eliquis for 

stroke prophylaxis given elevated CHADS-VASc score in the setting of htn, age, 

previous stroke.  needs improved rate control 


- cont metop 100mg po bid


- add po dilt 120 cd daily, titrate as needed.


- cont tele monitoring





3. Hypertrophic obstructive cardiomyopathy.    


- Would be important to limit heart rate given potential outflow obstruction, 

though blood pressure and patient stable at this time.  Avoid dehydration, 

nitrates





4.  Hypertension


- elevated diastolic


- cont to monitor may need added therapy 





5. Hyperlipidemia


- on statin.





6. h/o cerebrovascular accident. 


-  No focal defects at this time. 


- Continue with anticoagulation as above





7. Belching, n/v- ? GI etiology, previously seen. defer to pcp/gi.


Problems:  





Consultation Date/Type/Reason


Admit Date/Time


12/15/2017


Date of Consultation:  Dec 15, 2017


Type of Consultation:  Cardiology


Reason for Consultation


Afib, Chest pain


Referring Provider:  ASAD VAUGHN MD





Hx of Present Illness


Mr. Glez is a pleasant 77-year-old man with a history of HOCM, increased LVOT 

gradient, paroxysmal atrial fibrillation,CVA, CAD s/p PCI to RCA 2010, LAD 11/ 2016.  Pt presents today with one day of abd distention with belching, as well 

as exertional dyspnea, nightime sweats, and mild chest pressure.  Pt states 

went to PCP on wednesday and was feeling fine, but had these symptoms on 

thursday PM and progressive.  Pt states arrive at [facility], and had difficulty 

walking to ED with sob and had mild associated cp.  pt reports pain is 

different than previous when he had PCI. no radiation, mild,  + sweaty, + nausea

, dry heaves. no, palpitations, dizziness, fainting. pt denies any fevers, 

chills. + sweating. no bleeding, tolerating eliquis, aspirin 81mg daily. Pt 

found in [facility] ED to have afib with RVR, given iv dilt with improved hr, pt 

states chest pain now resolved. breathing improved, pt does not check bp/hr at 

home he states, compliant with meds. 





 EKG was reviewed, shows rate afib with RVRwith nonspecfic unchanged TWI, LVH 

no change from baseline.  CXR images reviewed did not show acute abnl.  Trop 

negative x 1.


Constitutional:  no complaints


Eyes:  no complaints


ENT:  no complaints


Respiratory: per hpi


Cardiovascular: per hpi


Gastrointestinal:  per hpi


Genitourinary:  no complaints


Musculoskeletal:  no complaints


Skin:  no complaints


Neurologic:  no complaints


Endocrine:  no complaints


Psychological:  nl mood/affect, no complaints


Immunologic:  no complaints





Past Medical History





Past Medical History





1.  Hypertrophic obstructive cardiomyopathy with known outflow obstruction with 

Valsalva up to 200.


2.  Chronic atrial fibrillation.  On Eliquis therapy for stroke prevention. 


3.  Hypertension.


4.  Recent CVA.


5.  Hyperlipidemia.


6.  Coronary artery disease, status post PCI in 2010 with drug-eluting stent in 

the right coronary artery, PCI in 2016 with MONIKA to proximal LAD after NSTEMI


7.  Belching





Family History


Significant Family History:  other (no early cad)





Social History


Alcohol Use:  none


Smoking Status:  Never smoker


Drug Use:  none





Past Surgical History


Past Surgical Hx:  angioplasty, other





Social History


Smoking Status:  Never smoker





Exam/Review of Systems


Vital Signs


Vitals





 Vital Signs








  Date Time  Temp Pulse Resp B/P Pulse Ox O2 Delivery O2 Flow Rate FiO2


 


12/15/17 14:00  89  148/90    


 


12/15/17 11:52   18     


 


12/15/17 09:38      Nasal Cannula  


 


12/15/17 08:51 97.6    99   











Exam





GENERAL:  No apparent distress.  Alert, oriented x3.


HEENT:  Pupils equal, round, react to light.  Oropharynx was clear.  Moist 

mucous membranes.


NECK:  No JVD.


CARDIOVASCULAR:  Irregularly irregular pulse.  Normal S1, S2. III/VI systolic 

ejection murmur, right upper sternal border.


PULMONARY:  Clear to auscultation, anterior, posterior and bilateral fields.


GASTROINTESTINAL:  Nontender, nondistended.  Normal bowel sounds.


EXTREMITIES:  No cyanosis, clubbing or edema.


NEUROLOGIC:  Nonfocal.





Results


Result Diagram:  


12/15/17 0935                                                                  

              12/15/17 0935





Results 24 hrs





Laboratory Tests








Test


  12/15/17


09:35


 


White Blood Count 3.6  L


 


Red Blood Count 5.10  


 


Hemoglobin 15.7  


 


Hematocrit 46.6  


 


Mean Corpuscular Volume 91.4  


 


Mean Corpuscular Hemoglobin 30.8  


 


Mean Corpuscular Hemoglobin


Concent 33.7  


 


 


Red Cell Distribution Width 14.3  


 


Platelet Count 117  L


 


Mean Platelet Volume 10.7  H


 


Neutrophils % 83.8  H


 


Lymphocytes % 6.4  L


 


Monocytes % 8.1  


 


Eosinophils % 0.3  


 


Basophils % 0.8  


 


Nucleated Red Blood Cells % 0.0  


 


Neutrophils # 3.0  


 


Lymphocytes # 0.2  L


 


Monocytes # 0.3  


 


Eosinophils # 0.0  


 


Basophils # 0.0  


 


Nucleated Red Blood Cells # 0.0  


 


Sodium Level 141  


 


Potassium Level 3.5  


 


Chloride Level 104  


 


Carbon Dioxide Level 25  


 


Anion Gap 16  


 


Blood Urea Nitrogen 11  


 


Creatinine 0.68  


 


Glucose Level 133  


 


Calcium Level 8.8  


 


Total Bilirubin 1.7  H


 


Direct Bilirubin 0.00  


 


Indirect Bilirubin 1.7  H


 


Aspartate Amino Transf


(AST/SGOT) 56  H


 


 


Alanine Aminotransferase


(ALT/SGPT) 45  


 


 


Alkaline Phosphatase 82  


 


Troponin I < 0.012  


 


B-Type Natriuretic Peptide 2960  H


 


Total Protein 7.2  


 


Albumin 3.9  


 


Globulin 3.30  H


 


Albumin/Globulin Ratio 1.18  











Procedures


Procedures


cxr report reviewed





ekg: per hpi











STEVAN DURANT Dec 15, 2017 14:57

## 2017-12-16 VITALS — SYSTOLIC BLOOD PRESSURE: 140 MMHG | DIASTOLIC BLOOD PRESSURE: 88 MMHG | RESPIRATION RATE: 20 BRPM

## 2017-12-16 VITALS — DIASTOLIC BLOOD PRESSURE: 97 MMHG | RESPIRATION RATE: 20 BRPM | SYSTOLIC BLOOD PRESSURE: 129 MMHG | HEART RATE: 109 BPM

## 2017-12-16 VITALS — HEART RATE: 85 BPM

## 2017-12-16 VITALS — RESPIRATION RATE: 18 BRPM | DIASTOLIC BLOOD PRESSURE: 73 MMHG | SYSTOLIC BLOOD PRESSURE: 121 MMHG

## 2017-12-16 VITALS — HEART RATE: 88 BPM

## 2017-12-16 VITALS — HEART RATE: 78 BPM

## 2017-12-16 VITALS — HEART RATE: 87 BPM

## 2017-12-16 VITALS — SYSTOLIC BLOOD PRESSURE: 152 MMHG | RESPIRATION RATE: 18 BRPM | DIASTOLIC BLOOD PRESSURE: 88 MMHG

## 2017-12-16 VITALS — HEART RATE: 95 BPM

## 2017-12-16 VITALS — SYSTOLIC BLOOD PRESSURE: 151 MMHG | RESPIRATION RATE: 18 BRPM | DIASTOLIC BLOOD PRESSURE: 84 MMHG

## 2017-12-16 VITALS — HEART RATE: 82 BPM

## 2017-12-16 LAB
ABNORMAL IP MESSAGE: 1
ALBUMIN SERPL-MCNC: 3.7 G/DL (ref 3.3–4.9)
ALBUMIN/GLOB SERPL: 1.05 {RATIO}
ALP SERPL-CCNC: 69 IU/L (ref 42–121)
ALT SERPL-CCNC: 44 IU/L (ref 13–69)
ANION GAP SERPL CALC-SCNC: 14 MMOL/L (ref 8–16)
AST SERPL-CCNC: 50 IU/L (ref 15–46)
BASOPHILS # BLD AUTO: 0 10^3/UL (ref 0–0.1)
BASOPHILS NFR BLD: 1.1 % (ref 0–2)
BILIRUB DIRECT SERPL-MCNC: 0 MG/DL (ref 0–0.2)
BILIRUB SERPL-MCNC: 2 MG/DL (ref 0.2–1.3)
BUN SERPL-MCNC: 12 MG/DL (ref 7–20)
CALCIUM SERPL-MCNC: 9.2 MG/DL (ref 8.4–10.2)
CHLORIDE SERPL-SCNC: 102 MMOL/L (ref 97–110)
CHOLEST SERPL-MCNC: 163 MG/DL (ref 100–200)
CHOLEST/HDLC SERPL: 2.7 RATIO
CO2 SERPL-SCNC: 29 MMOL/L (ref 21–31)
CREAT SERPL-MCNC: 0.71 MG/DL (ref 0.61–1.24)
EOSINOPHIL # BLD: 0 10^3/UL (ref 0–0.5)
EOSINOPHIL NFR BLD: 0.4 % (ref 0–7)
ERYTHROCYTE [DISTWIDTH] IN BLOOD BY AUTOMATED COUNT: 14.6 % (ref 11.5–14.5)
GLOBULIN SER-MCNC: 3.5 G/DL (ref 1.3–3.2)
GLUCOSE SERPL-MCNC: 80 MG/DL (ref 70–220)
HCT VFR BLD CALC: 44.2 % (ref 42–52)
HDLC SERPL-MCNC: 60 MG/DL (ref 31–75)
HGB BLD-MCNC: 14.6 G/DL (ref 14–18)
LYMPHOCYTES # BLD AUTO: 0.4 10^3/UL (ref 0.8–2.9)
LYMPHOCYTES NFR BLD AUTO: 12.8 % (ref 15–51)
MAGNESIUM SERPL-MCNC: 1.8 MG/DL (ref 1.7–2.5)
MCH RBC QN AUTO: 30.8 PG (ref 29–33)
MCHC RBC AUTO-ENTMCNC: 33 G/DL (ref 32–37)
MCV RBC AUTO: 93.2 FL (ref 82–101)
MONOCYTES # BLD: 0.5 10^3/UL (ref 0.3–0.9)
MONOCYTES NFR BLD: 16 % (ref 0–11)
NEUTROPHILS # BLD: 2 10^3/UL (ref 1.6–7.5)
NEUTROPHILS NFR BLD AUTO: 69.3 % (ref 39–77)
NRBC # BLD MANUAL: 0 10^3/UL (ref 0–0)
NRBC BLD AUTO-RTO: 0 /100WBC (ref 0–0)
PLATELET # BLD: 108 10^3/UL (ref 140–415)
PMV BLD AUTO: 11.4 FL (ref 7.4–10.4)
POSITIVE DIFF: (no result)
POTASSIUM SERPL-SCNC: 3.9 MMOL/L (ref 3.5–5.1)
PROT SERPL-MCNC: 7.2 G/DL (ref 6.1–8.1)
RBC # BLD AUTO: 4.74 10^6/UL (ref 4.7–6.1)
SODIUM SERPL-SCNC: 141 MMOL/L (ref 135–144)
TRIGL SERPL-MCNC: 82 MG/DL (ref 0–149)
TROPONIN I SERPL-MCNC: < 0.012 NG/ML (ref 0–0.12)
TSH SERPL-ACNC: 2.6 MIU/L (ref 0.47–4.68)
WBC # BLD AUTO: 2.8 10^3/UL (ref 4.8–10.8)

## 2017-12-16 RX ADMIN — METOPROLOL TARTRATE SCH MG: 100 TABLET, FILM COATED ORAL at 10:02

## 2017-12-16 RX ADMIN — METOPROLOL TARTRATE SCH MG: 100 TABLET, FILM COATED ORAL at 21:24

## 2017-12-16 RX ADMIN — ASPIRIN 81 MG SCH MG: 81 TABLET ORAL at 09:57

## 2017-12-16 RX ADMIN — PANTOPRAZOLE SODIUM SCH MG: 40 TABLET, DELAYED RELEASE ORAL at 06:06

## 2017-12-16 RX ADMIN — SUCRALFATE SCH GM: 1 TABLET ORAL at 09:57

## 2017-12-16 RX ADMIN — APIXABAN SCH MG: 5 TABLET, FILM COATED ORAL at 10:01

## 2017-12-16 RX ADMIN — SUCRALFATE SCH GM: 1 TABLET ORAL at 21:23

## 2017-12-16 RX ADMIN — APIXABAN SCH MG: 5 TABLET, FILM COATED ORAL at 21:24

## 2017-12-16 RX ADMIN — DILTIAZEM HYDROCHLORIDE SCH MG: 120 CAPSULE, EXTENDED RELEASE ORAL at 13:09

## 2017-12-16 RX ADMIN — BUSPIRONE HYDROCHLORIDE SCH MG: 5 TABLET ORAL at 09:57

## 2017-12-16 RX ADMIN — BUSPIRONE HYDROCHLORIDE SCH MG: 5 TABLET ORAL at 21:23

## 2017-12-16 NOTE — HP
DATE OF ADMISSION: 12/15/2017

 

HISTORY OF PRESENT ILLNESS:  The patient is a 77-year-old gentleman with a history of coronary arter
y disease, hypertrophic cardiomyopathy presenting with recurrent chest pressure, shortness of breath
, nausea since this morning.  The patient also has been having some chills and per the patient's wif
e, he has been sleeping excessively.  The patient also has been having some nausea and belching, whi
ch he has been doing constantly over the last few weeks.  Denies any constipation or diarrhea.  No f
ever, but has been having chills and some night sweats.  No history of weight loss.

 

MEDICATIONS:

1.  Eliquis 5 mg b.i.d.

2.  Aspirin 81 mg daily.

3.  BuSpar 5 mg b.i.d.

4.  Carafate 1 gram b.i.d.

5.  Protonix 40 mg daily.

6.  Metoprolol tartrate 100 mg b.i.d.

7.  Simvastatin 40 mg p.o. daily.

 

REVIEW OF SYSTEMS:

HEAD:  No history of headaches, prior history of stroke.

EYES:  No blurry vision or glaucoma.  The patient has glasses.

ENT:  Noncontributory.

NECK:  No history of thyroid disease.

CHEST:  No bronchitis, hay fever, or asthma.  The patient does not smoke.

HEART:  History of coronary artery disease, status post PTCA of the proximal LAD with a drug-eluting
 stent, history of recurrent AFib, history of hypertrophic obstructive cardiomyopathy. Lexiscan done
 4 months back showed no evidence of any perfusion defects.

GASTROINTESTINAL:  History of gastroesophageal reflux disease, esophagitis, status post endoscopy 4 
months back.

GENITOURINARY:  No dysuria, hematuria, kidney stones.

HEMATOLOGIC: History of chronic leukopenia no evidence of myelodysplasia.

 

PHYSICAL EXAMINATION:

GENERAL:  The patient is an average-built male who is presently in no acute distress.

VITAL SIGNS:  Heart rate 110 per minute, irregular, blood pressure 160/91, respiration 20 per minute
.

HEENT:  Head normocephalic.  No pallor, cyanosis, or icterus.  Tongue is coated, dry.

NECK:  Supple.  No thyromegaly, bruits or lymphadenopathy.

LUNGS:  Clinically clear.

HEART:  S1, S2 heard with no definite gallops.

ABDOMEN:  Obese, nontender, no hepatosplenomegaly.

EXTREMITIES:  No edema.  Pedals 1+ bilaterally.  Homans sign is negative.

NEUROLOGIC:  No localizing or lateralizing signs.

RECTAL:  Deferred due to patient discomfort.

 

LABORATORY DATA:  Initial WBC count 3.6, hematocrit 46.6, platelet count 117,000.  Sodium 141, potas
sium 3.5, BUN 11, creatinine 0.68.  BNP 2960.  Chest x-ray shows mild cardiomegaly, calcified bilate
ral pleural plaques.

 

IMPRESSION:

1.  Atypical chest pain with atrial fibrillation with rapid ventricular response, better controlled 
now after 1 dose of metoprolol 5 mg IV.

2.  Hypertrophic obstructive cardiomyopathy.

3.  Hypertension.

4.  Chronic atrial fibrillation.  No evidence of congestive heart failure.

5.  Obesity.

6.  Gastroesophageal reflux disease with recurrent gastritis.

 

PLAN:  Patient admitted to telemetry.  Rule out acute coronary syndrome, serial troponins and contin
ue beta blockers and Eliquis.  Obtain cardiac consultation with Dr. Lr from GI standpoint.  Cont
inue proton pump inhibitor and also Carafate.  The patient does have small gallstones noted on the C
AT scan done in August.  Will obtain an abdominal ultrasound as well.

 

 

Dictated By: ASAD VAUGHN MD, SR/RENE

DD:    12/15/2017 14:25:52

DT:    12/16/2017 07:02:48

Conf#: [id number]

DID#:  [id number]

## 2017-12-16 NOTE — PN
Date/Time of Note


Date/Time of Note


DATE: 12/16/17 


TIME: 13:03


SUBJECTIVE:  


Patient feeling fine this morning much less anxious denies any chest pains 

abdominal bloating any palpitations dyspnea PND orthopnea.  Has been very 

stressed at home taking care of his wife.  States that he has been taking his 

medications religiously.  Chart, medications and laboratory studies reviewed.





ROS:


Patient denied any fevers, chills, weight loss, nausea, vomiting, diarrhea, 

constipation, cough, hemoptysis, dysuria, hematuria, nocturia, any neurologic 

symptoms, headache, any chest pains, dyspnea, PND, orthopnea, leg edema, or 

palpitations. All other review of systems were normal.





OBJECTIVE:   


Vital signs please see chart.





HEENT; no JVD, no HJR, carotids 2 over 4+ without bruits.


Chest: Clear to auscultation and percussion, no rales, wheezes or rhonchi.


Cardiac: S1, S2 with normal physiologic splitting, 2/6 systolic ejection murmur

, possibly increase with Valsalva, no rub click or diastolic murmur noted.


Abdominal: Bowel sounds positive, soft nontender, no abdominal bruit noted, no 

hepatosplenomegaly.


Extremities: No cyanosis, clubbing, or edema. Negative Homans sign or palpable 

cords.


Pulses: 2/4 pulses diffusely no bruits noted.





LABORATORY STUDIES;


White count low at 2.8, platelets low at 108, normal hemoglobin hematocrit 14.6/

44.2, AST minimally elevated at 50, ALT 44, troponin 4 negative, normal TSH, 

total cholesterol 163, LDL 87, HDL 60, triglycerides 82.


Chest x-ray cardiomegaly no heart failure no wide mediastinum.


EKG from yesterday atrial fibrillation rate approximately 100 nonspecific ST 

abnormality no significant change from 17 August 2017 tracing.


Telemetry reveals controlled atrial fibrillation now 70s-90s.





ASSESSMENT:  


1.  Persistent to chronic atrial fibrillation with periods of increased heart 

rate.


2.  Hypertrophic cardiomyopathy without obstructive component on last 

echocardiogram 7/17.


3.  Mild to moderate aortic stenosis on last echocardiogram 7/17.


4.  Hypertension.


5.  Hyperlipidemia.


6.  Coronary artery disease distant right coronary artery stent 2010, non-ST 

elevation MI 11/16 with mid LAD stent.


7.  Eosinophilic esophagitis, gastritis, Schatzki's ring noted on upper 

endoscopy 7/17.


8.  Anxiety.


9.  History of small CVAstable


At this time the patient's atrial fibrillation rates are controlled with the 

addition of Cardizem to high-dose metoprolol.  It is key that whenever he gets 

anxious or has abdominal discomfort his heart rate increases and this clearly 

is detrimental with his hypertrophic cardiomyopathy.  Digoxin is 

contraindicated with his thickened myocardium but will continue calcium blocker 

and high-dose metoprolol as well as Eliquis in patient with prior CVA and high 

chads VASC score.





PLAN:


1.  Continue metoprolol, Eliquis, Cardizem, aspirin, from cardiac standpoint 

and Protonix and Carafate for esophagitis.


2.  Consider change in anxiolytic medication per Dr. Mata.


3.  Increase activity with Cardizem and metoprolol on board follow heart rates 

check EKG tomorrow if stable may be to discharge from cardiac standpoint.











MARNIE NAJERA MD Dec 16, 2017 13:10

## 2017-12-17 VITALS — SYSTOLIC BLOOD PRESSURE: 118 MMHG | DIASTOLIC BLOOD PRESSURE: 80 MMHG | RESPIRATION RATE: 18 BRPM

## 2017-12-17 VITALS — SYSTOLIC BLOOD PRESSURE: 157 MMHG | DIASTOLIC BLOOD PRESSURE: 90 MMHG | RESPIRATION RATE: 18 BRPM

## 2017-12-17 VITALS — RESPIRATION RATE: 20 BRPM | DIASTOLIC BLOOD PRESSURE: 96 MMHG | HEART RATE: 100 BPM | SYSTOLIC BLOOD PRESSURE: 119 MMHG

## 2017-12-17 VITALS — HEART RATE: 91 BPM

## 2017-12-17 VITALS — HEART RATE: 78 BPM

## 2017-12-17 VITALS — SYSTOLIC BLOOD PRESSURE: 120 MMHG | RESPIRATION RATE: 18 BRPM | DIASTOLIC BLOOD PRESSURE: 78 MMHG

## 2017-12-17 VITALS — HEART RATE: 98 BPM

## 2017-12-17 VITALS — HEART RATE: 81 BPM

## 2017-12-17 VITALS — RESPIRATION RATE: 20 BRPM | SYSTOLIC BLOOD PRESSURE: 141 MMHG | DIASTOLIC BLOOD PRESSURE: 95 MMHG

## 2017-12-17 LAB
ABNORMAL IP MESSAGE: 1
ALBUMIN SERPL-MCNC: 3.5 G/DL (ref 3.3–4.9)
ALP SERPL-CCNC: 60 IU/L (ref 42–121)
ALT SERPL-CCNC: 55 IU/L (ref 13–69)
AMYLASE SERPL-CCNC: 37 U/L (ref 11–123)
ANION GAP SERPL CALC-SCNC: 12 MMOL/L (ref 8–16)
AST SERPL-CCNC: 60 IU/L (ref 15–46)
BASOPHILS # BLD AUTO: 0 10^3/UL (ref 0–0.1)
BASOPHILS NFR BLD: 0.4 % (ref 0–2)
BILIRUB DIRECT SERPL-MCNC: 0 MG/DL (ref 0–0.2)
BILIRUB SERPL-MCNC: 1.8 MG/DL (ref 0.2–1.3)
BUN SERPL-MCNC: 14 MG/DL (ref 7–20)
CALCIUM SERPL-MCNC: 9.1 MG/DL (ref 8.4–10.2)
CHLORIDE SERPL-SCNC: 104 MMOL/L (ref 97–110)
CO2 SERPL-SCNC: 30 MMOL/L (ref 21–31)
CREAT SERPL-MCNC: 0.78 MG/DL (ref 0.61–1.24)
EOSINOPHIL # BLD: 0 10^3/UL (ref 0–0.5)
EOSINOPHIL NFR BLD: 0.8 % (ref 0–7)
ERYTHROCYTE [DISTWIDTH] IN BLOOD BY AUTOMATED COUNT: 14.7 % (ref 11.5–14.5)
GLUCOSE SERPL-MCNC: 84 MG/DL (ref 70–220)
HCT VFR BLD CALC: 44 % (ref 42–52)
HGB BLD-MCNC: 14.4 G/DL (ref 14–18)
LYMPHOCYTES # BLD AUTO: 0.4 10^3/UL (ref 0.8–2.9)
LYMPHOCYTES NFR BLD AUTO: 13.9 % (ref 15–51)
MAGNESIUM SERPL-MCNC: 1.8 MG/DL (ref 1.7–2.5)
MCH RBC QN AUTO: 30.7 PG (ref 29–33)
MCHC RBC AUTO-ENTMCNC: 32.7 G/DL (ref 32–37)
MCV RBC AUTO: 93.8 FL (ref 82–101)
MONOCYTES # BLD: 0.5 10^3/UL (ref 0.3–0.9)
MONOCYTES NFR BLD: 18.5 % (ref 0–11)
NEUTROPHILS # BLD: 1.7 10^3/UL (ref 1.6–7.5)
NEUTROPHILS NFR BLD AUTO: 65.6 % (ref 39–77)
NRBC # BLD MANUAL: 0 10^3/UL (ref 0–0)
NRBC BLD AUTO-RTO: 0 /100WBC (ref 0–0)
PHOSPHATE SERPL-MCNC: 4.5 MG/DL (ref 2.5–4.9)
PLATELET # BLD: 97 10^3/UL (ref 140–415)
PMV BLD AUTO: 11.3 FL (ref 7.4–10.4)
POSITIVE DIFF: (no result)
POTASSIUM SERPL-SCNC: 4.4 MMOL/L (ref 3.5–5.1)
PROT SERPL-MCNC: 6.7 G/DL (ref 6.1–8.1)
RBC # BLD AUTO: 4.69 10^6/UL (ref 4.7–6.1)
SODIUM SERPL-SCNC: 142 MMOL/L (ref 135–144)
WBC # BLD AUTO: 2.6 10^3/UL (ref 4.8–10.8)

## 2017-12-17 RX ADMIN — APIXABAN SCH MG: 5 TABLET, FILM COATED ORAL at 09:21

## 2017-12-17 RX ADMIN — METOPROLOL TARTRATE SCH MG: 100 TABLET, FILM COATED ORAL at 09:22

## 2017-12-17 RX ADMIN — SUCRALFATE SCH GM: 1 TABLET ORAL at 09:20

## 2017-12-17 RX ADMIN — ASPIRIN 81 MG SCH MG: 81 TABLET ORAL at 09:20

## 2017-12-17 RX ADMIN — BUSPIRONE HYDROCHLORIDE SCH MG: 5 TABLET ORAL at 09:20

## 2017-12-17 RX ADMIN — PANTOPRAZOLE SODIUM SCH MG: 40 TABLET, DELAYED RELEASE ORAL at 05:58

## 2017-12-17 RX ADMIN — DILTIAZEM HYDROCHLORIDE SCH MG: 120 CAPSULE, EXTENDED RELEASE ORAL at 09:22

## 2017-12-17 NOTE — PN
Date/Time of Note


Date/Time of Note


DATE: 12/17/17 


TIME: 12:36


SUBJECTIVE:  


Patient feeling fine this morning ambulating the hallways denies any complaints 

of chest pain dyspnea palpitations or any abdominal pain.  Was seen by 

gastroenterology and same medications to be continued.





ROS:


Patient denied any fevers, chills, weight loss, nausea, vomiting, diarrhea, 

constipation, cough, hemoptysis, dysuria, hematuria, nocturia, any neurologic 

symptoms, headache, any chest pains, dyspnea, PND, orthopnea, leg edema, or 

palpitations. All other review of systems were normal.





OBJECTIVE:   


Vital signs please see chart.





HEENT; no JVD, no HJR, carotids 2 over 4+ without bruits.


Chest: Clear to auscultation and percussion, no rales, wheezes or rhonchi.


Cardiac: S1, S2 with normal physiologic splitting, 2/6 systolic ejection murmur

, possibly increase with Valsalva, no rub click or diastolic murmur noted.


Abdominal: Bowel sounds positive, soft nontender, no abdominal bruit noted, no 

hepatosplenomegaly.


Extremities: No cyanosis, clubbing, or edema. Negative Homans sign or palpable 

cords.


Pulses: 2/4 pulses diffusely no bruits noted.





LABORATORY STUDIES;


White count low at 2.6, platelets 97,000, electrolytes BUN/creatinine normal 

magnesium 1.8, normal amylase and lipase.


Telemetry overall controlled atrial fibrillation last night rate did increase 

up to 120 bpm.


EKG reviewed by myself today reveals atrial fibrillation rate of 85, 

nonspecific ST abnormality, borderline left ventricular hypertrophy poor R-wave 

progression no significant change nonspecific ST abnormality consistent with 

digoxin effect.





ASSESSMENT:  


1.  Persistent to chronic atrial fibrillation with periods of increased heart 

rate.


2.  Hypertrophic cardiomyopathy without obstructive component on last 

echocardiogram 7/17.


3.  Mild to moderate aortic stenosis on last echocardiogram 7/17.


4.  Hypertension.


5.  Hyperlipidemia.


6.  Coronary artery disease distant right coronary artery stent 2010, non-ST 

elevation MI 11/16 with mid LAD stent.


7.  Eosinophilic esophagitis, gastritis, Schatzki's ring noted on upper 

endoscopy 7/17.


8.  Anxiety.


9.  History of small CVAstable


10.  Leukopenia thrombocytopenia unclear etiology.  Patient was leukopenic last 

month as well.


At this time the patient's atrial fibrillation rates are controlled with the 

addition of Cardizem to high-dose metoprolol.  Will increase Cardizem CD to 180 

a day but otherwise stable from a cardiac standpoint for discharge with follow-

up with Dr. Pierre in 2-3 weeks.  Continue Eliquis, metoprolol, aspirin, statin

, higher dose Cardizem  and metoprolol as mentioned.





PLAN:


1.  Continue metoprolol, Eliquis, Cardizem, statin, Toprol, aspirin, from 

cardiac standpoint and Protonix and Carafate for esophagitis.


2.  Increase Cardizem CD to 180 mg a day in addition to metoprolol.


3.  Stable from cardiac standpoint for discharge discussed with Dr. Mata.  Follow-up with Dr. Pierre in 2 weeks.











MARNIE NAJERA MD Dec 17, 2017 12:42

## 2017-12-17 NOTE — CONS
DATE OF ADMISSION: 12/15/2017

DATE OF CONSULTATION:  

 

 

 

HISTORY OF PRESENT ILLNESS:  The patient is a 77-year-old male with a history of coronary artery dis
ease, hypertrophic cardiomyopathy presents to the ER with abdominal pain, abdominal distention, ches
t pain, and shortness of breath.  He was having some chills.  The patient also complains of nausea a
nd belching.  No GI bleeding.  He came to the emergency room.  He was evaluated.  Started on appropr
iate treatment.  Today when interrogated, the patient denied of any abdominal pain, no nausea, no vo
miting, no chest pain, no shortness of breath.  He does admit to some stress at home and also anxiet
y.  No history of GI bleeding.  The patient is known to have atrial fibrillation and also hypertroph
ic cardiomyopathy without any obstructive component.  He had undergone upper endoscopy by me, which 
showed Schatzki's ring, eosinophilic esophagitis.

 

HOME MEDICATIONS:  All reviewed these:

1.  Eliquis.

2.  Aspirin.

3.  Buspar.

4.  Carafate.

5.  Protonix.

6.  Metoprolol.

7.  Simvastatin.

 

REVIEW OF SYSTEMS:  Otherwise, negative.

 

PHYSICAL EXAMINATION

GENERAL:  Well built, nourished, not in distress.

VITAL SIGNS:  Stable.

HEENT:  Unremarkable.

NECK:  Supple, no thyromegaly, no lymphadenopathy.

CARDIOVASCULAR:  No murmur, gallop or click.

LUNGS:  Clear.

ABDOMEN:  Benign.

EXTREMITIES:  No edema.

CENTRAL NERVOUS SYSTEM:  Grossly within normal limits.

 

LABORATORY DATA:  All stable.  His BNP was mildly elevated to 2960.  Chest x-ray revealed cardiomega
ly and pleural calcification secondary to asbestosis exposure in the past.

 

IMPRESSION:

1.  Abdominal pain.  Last seen for a few hours with distention and shortness of breath, although sym
ptoms have completely subsided.

2.  Anxiety disorder.

3.  Atrial fibrillation.

4.  Hypertension.

5.  Hypertrophic cardiomyopathy.

6.  Reflux esophagitis.

7.  Obesity.

 

PLAN:  Continue present care.  The patient has been advised to reduce the stressor.  Exercise walkin
g for half an hour daily.  Yogurt is a probiotic on a regular basis and continue with proton pump in
hibitor.

 

 

Dictated By: KRISTINE RICCI/RENE

DD:    12/16/2017 20:03:29

DT:    12/17/2017 07:05:20

Conf#: [id number]

DID#:  [id number]

CC: ASAD VAUGHN MD;*[facility]*

## 2017-12-17 NOTE — RADRPT
Vent Rate: 87 bpm

RR Interval: 0 msec

TN Interval: 0 msec

QRS Duration: 124 msec

QT Interval: 414 msec

QTC Interval: 498 msec

P-R-T Axis: 0 - -6 - -73 degrees

 

 Atrial fibrillation with premature ventricular or aberrantly conducted 

complexes

 Left ventricular hypertrophy with QRS widening

 ST  amp; T wave abnormality, consider inferolateral ischemia or 

digitalis effect

 Abnormal ECG

 

Electronically Signed By: Keshav Andrews

[id number]

## 2017-12-17 NOTE — DS
DATE OF ADMISSION: 12/15/2017

DATE OF DISCHARGE: 12/17/2017

 

FINAL DIAGNOSES:

1.  Atrial fibrillation with rapid ventricular response, improved.

2.  Hypertrophic obstructive cardiomyopathy.

3.  Moderate aortic stenosis.

4.  Hypertension.

5.  Hyperlipidemia.

6.  Coronary artery disease, distant right coronary artery stent in 2010, non-ST elevation myocardia
l infarction in 11/2016, with mid LAD stent.

7.  Recurrent gastritis with gastroesophageal reflux disease and Schatzki's ring.

8.  Cholelithiasis.

9.  Status post prior cerebrovascular accident.

10.  Underlying anxiety.

11.  Chronic leukopenia and thrombocytopenia, so far no evidence of mild dysplasia.

 

HOSPITAL COURSE:  The patient is a 77-year-old gentleman with a history of coronary artery disease, 
hypertrophic cardiomyopathy, aortic stenosis, presenting with recurrent chest pain, shortness of robel
ath, nausea since morning of 12/15/2017, found to be in atrial fibrillation with rapid ventricular r
esponse.  The patient also has had severe nausea with retching for the past few weeks.  Also, has be
en having some chills, but had some night sweats.  No fever.  No history of weight loss.  

 

PHYSICAL EXAMINATION: 

GENERAL:  The patient was found to be in no acute distress.

VITAL SIGNS:  Temperature 98.4, heart rate 110 per minute, irregularly irregular.  Blood pressure 16
0/91.  

CHEST:  Clinically clear.

HEART:  S1, S2 heard with no definite gallops.

ABDOMEN:  Soft, nontender, obese.  No hepatosplenomegaly.

EXTREMITIES:  No edema.  

 

LABORATORIES:  Initial WBC count is 3.6, hematocrit 46.6, platelet count _____.  Sodium 141, potassi
um 3.5.  Troponin x2 was negative.  The patient was seen by Dr. Lemus from cardiology standpoint,
 who recommended adding Cardizem with improvement of her heart rate and the patient had significantl
y improved, and his white blood cell count was 2.6, with hematocrit 44, platelet count of _____, whi
ch will be followed over the course of next 2 weeks.  The patient has had a prior evaluation for rachel
e and if necessary, will have a followup hematology consultation as an outpatient.

 

DISCHARGE CONDITION:  Much improved.

 

 

Dictated By: ASAD VAUGHN MD

 

SR/NTS

DD:    12/17/2017 14:19:29

DT:    12/17/2017 17:49:47

Conf#: [id number]

DID#:  [id number]

## 2018-01-08 ENCOUNTER — HOSPITAL ENCOUNTER (INPATIENT)
Dept: HOSPITAL 91 - E/R | Age: 78
LOS: 3 days | Discharge: HOME | DRG: 392 | End: 2018-01-11
Payer: MEDICARE

## 2018-01-08 ENCOUNTER — HOSPITAL ENCOUNTER (INPATIENT)
Age: 78
LOS: 3 days | Discharge: HOME | DRG: 392 | End: 2018-01-11

## 2018-01-08 DIAGNOSIS — I10: ICD-10-CM

## 2018-01-08 DIAGNOSIS — Z95.5: ICD-10-CM

## 2018-01-08 DIAGNOSIS — R07.89: ICD-10-CM

## 2018-01-08 DIAGNOSIS — I48.91: ICD-10-CM

## 2018-01-08 DIAGNOSIS — K80.20: ICD-10-CM

## 2018-01-08 DIAGNOSIS — F41.9: ICD-10-CM

## 2018-01-08 DIAGNOSIS — K21.0: Primary | ICD-10-CM

## 2018-01-08 DIAGNOSIS — I42.1: ICD-10-CM

## 2018-01-08 DIAGNOSIS — I25.10: ICD-10-CM

## 2018-01-08 LAB
ABNORMAL IP MESSAGE: 1
ADD MAN DIFF?: NO
ALANINE AMINOTRANSFERASE: 39 IU/L (ref 13–69)
ALBUMIN: 4.3 G/DL (ref 3.3–4.9)
ALKALINE PHOSPHATASE: 88 IU/L (ref 42–121)
ANION GAP: 20 (ref 8–16)
ASPARTATE AMINO TRANSFERASE: 47 IU/L (ref 15–46)
B-TYPE NATRIURETIC PEPTIDE: 2020 PG/ML (ref 0–450)
BASOPHIL #: 0 10^3/UL (ref 0–0.1)
BASOPHILS %: 0.6 % (ref 0–2)
BILIRUBIN,DIRECT: 0 MG/DL (ref 0–0.2)
BILIRUBIN,TOTAL: 0.9 MG/DL (ref 0.2–1.3)
BLOOD UREA NITROGEN: 10 MG/DL (ref 7–20)
CALCIUM: 8.7 MG/DL (ref 8.4–10.2)
CARBON DIOXIDE: 27 MMOL/L (ref 21–31)
CHLORIDE: 103 MMOL/L (ref 97–110)
CK INDEX: 3.3
CK-MB: 1.13 NG/ML (ref 0–2.4)
CREATINE KINASE: 34 IU/L (ref 23–200)
CREATININE: 0.68 MG/DL (ref 0.61–1.24)
EOSINOPHILS #: 0 10^3/UL (ref 0–0.5)
EOSINOPHILS %: 0.2 % (ref 0–7)
GLUCOSE: 133 MG/DL (ref 70–220)
HEMATOCRIT: 49.2 % (ref 42–52)
HEMOGLOBIN: 16.3 G/DL (ref 14–18)
INR: 1.13
LIPASE: 129 U/L (ref 23–300)
LYMPHOCYTES #: 0.4 10^3/UL (ref 0.8–2.9)
LYMPHOCYTES %: 7.7 % (ref 15–51)
MEAN CORPUSCULAR HEMOGLOBIN: 30.4 PG (ref 29–33)
MEAN CORPUSCULAR HGB CONC: 33.1 G/DL (ref 32–37)
MEAN CORPUSCULAR VOLUME: 91.8 FL (ref 82–101)
MEAN PLATELET VOLUME: 10.8 FL (ref 7.4–10.4)
MONOCYTE #: 0.3 10^3/UL (ref 0.3–0.9)
MONOCYTES %: 5.5 % (ref 0–11)
NEUTROPHIL #: 4.7 10^3/UL (ref 1.6–7.5)
NEUTROPHILS %: 85.8 % (ref 39–77)
NUCLEATED RED BLOOD CELLS #: 0 10^3/UL (ref 0–0)
NUCLEATED RED BLOOD CELLS%: 0 /100WBC (ref 0–0)
PARTIAL THROMBOPLASTIN TIME: 31.2 SEC (ref 25–35)
PLATELET COUNT: 193 10^3/UL (ref 140–415)
POSITIVE DIFF: (no result)
POTASSIUM: 3.9 MMOL/L (ref 3.5–5.1)
PROTIME: 14.7 SEC (ref 11.9–14.9)
PT RATIO: 1.1
RED BLOOD COUNT: 5.36 10^6/UL (ref 4.7–6.1)
RED CELL DISTRIBUTION WIDTH: 14 % (ref 11.5–14.5)
SODIUM: 146 MMOL/L (ref 135–144)
TOTAL PROTEIN: 8 G/DL (ref 6.1–8.1)
TROPONIN-I: < 0.012 NG/ML (ref 0–0.12)
TROPONIN-I: < 0.012 NG/ML (ref 0–0.12)
WHITE BLOOD COUNT: 5.4 10^3/UL (ref 4.8–10.8)

## 2018-01-08 PROCEDURE — 85730 THROMBOPLASTIN TIME PARTIAL: CPT

## 2018-01-08 PROCEDURE — 36415 COLL VENOUS BLD VENIPUNCTURE: CPT

## 2018-01-08 PROCEDURE — 82553 CREATINE MB FRACTION: CPT

## 2018-01-08 PROCEDURE — 96374 THER/PROPH/DIAG INJ IV PUSH: CPT

## 2018-01-08 PROCEDURE — 85610 PROTHROMBIN TIME: CPT

## 2018-01-08 PROCEDURE — 80048 BASIC METABOLIC PNL TOTAL CA: CPT

## 2018-01-08 PROCEDURE — 83690 ASSAY OF LIPASE: CPT

## 2018-01-08 PROCEDURE — 74176 CT ABD & PELVIS W/O CONTRAST: CPT

## 2018-01-08 PROCEDURE — 85025 COMPLETE CBC W/AUTO DIFF WBC: CPT

## 2018-01-08 PROCEDURE — 83735 ASSAY OF MAGNESIUM: CPT

## 2018-01-08 PROCEDURE — 71045 X-RAY EXAM CHEST 1 VIEW: CPT

## 2018-01-08 PROCEDURE — 80053 COMPREHEN METABOLIC PANEL: CPT

## 2018-01-08 PROCEDURE — 96375 TX/PRO/DX INJ NEW DRUG ADDON: CPT

## 2018-01-08 PROCEDURE — 83880 ASSAY OF NATRIURETIC PEPTIDE: CPT

## 2018-01-08 PROCEDURE — 93005 ELECTROCARDIOGRAM TRACING: CPT

## 2018-01-08 PROCEDURE — 80076 HEPATIC FUNCTION PANEL: CPT

## 2018-01-08 PROCEDURE — 82550 ASSAY OF CK (CPK): CPT

## 2018-01-08 PROCEDURE — 99291 CRITICAL CARE FIRST HOUR: CPT

## 2018-01-08 PROCEDURE — 84484 ASSAY OF TROPONIN QUANT: CPT

## 2018-01-08 RX ADMIN — LIDOCAINE HYDROCHLORIDE 1 MLS/HR: 10 INJECTION, SOLUTION EPIDURAL; INFILTRATION; INTRACAUDAL; PERINEURAL at 12:15

## 2018-01-08 RX ADMIN — ONDANSETRON HYDROCHLORIDE 1 MG: 2 INJECTION, SOLUTION INTRAMUSCULAR; INTRAVENOUS at 13:26

## 2018-01-08 RX ADMIN — METOPROLOL TARTRATE 1 MG: 100 TABLET ORAL at 22:39

## 2018-01-08 RX ADMIN — ACETAMINOPHEN 1 MG: 325 TABLET, FILM COATED ORAL at 15:28

## 2018-01-08 RX ADMIN — ONDANSETRON HYDROCHLORIDE 1 MG: 2 INJECTION, SOLUTION INTRAMUSCULAR; INTRAVENOUS at 20:08

## 2018-01-08 RX ADMIN — ATORVASTATIN CALCIUM 1 MG: 20 TABLET, FILM COATED ORAL at 22:38

## 2018-01-08 RX ADMIN — APIXABAN 1 MG: 5 TABLET, FILM COATED ORAL at 22:38

## 2018-01-08 RX ADMIN — LORAZEPAM 1 MG: 2 INJECTION, SOLUTION INTRAMUSCULAR; INTRAVENOUS at 13:35

## 2018-01-08 RX ADMIN — ASPIRIN 1 MG: 81 TABLET, COATED ORAL at 22:37

## 2018-01-08 RX ADMIN — DILTIAZEM HYDROCHLORIDE 1 MG: 180 CAPSULE, COATED, EXTENDED RELEASE ORAL at 22:36

## 2018-01-08 RX ADMIN — ONDANSETRON HYDROCHLORIDE 1 MG: 2 INJECTION, SOLUTION INTRAMUSCULAR; INTRAVENOUS at 15:29

## 2018-01-08 RX ADMIN — ASPIRIN 325 MG ORAL TABLET 1 MG: 325 PILL ORAL at 12:45

## 2018-01-08 RX ADMIN — DILTIAZEM HYDROCHLORIDE 1 MG: 5 INJECTION INTRAVENOUS at 13:15

## 2018-01-08 RX ADMIN — SUCRALFATE 1 GM: 1 TABLET ORAL at 22:38

## 2018-01-08 RX ADMIN — BUSPIRONE HYDROCHLORIDE 1 MG: 5 TABLET ORAL at 22:37

## 2018-01-08 RX ADMIN — METOPROLOL TARTRATE 1 MG: 5 INJECTION, SOLUTION INTRAVENOUS at 13:16

## 2018-01-09 LAB
ABNORMAL IP MESSAGE: 1
ADD MAN DIFF?: NO
ALANINE AMINOTRANSFERASE: 33 IU/L (ref 13–69)
ALBUMIN/GLOBULIN RATIO: 1.02
ALBUMIN: 3.7 G/DL (ref 3.3–4.9)
ALKALINE PHOSPHATASE: 77 IU/L (ref 42–121)
ANION GAP: 11 (ref 8–16)
ASPARTATE AMINO TRANSFERASE: 36 IU/L (ref 15–46)
BASOPHIL #: 0 10^3/UL (ref 0–0.1)
BASOPHILS %: 0.4 % (ref 0–2)
BILIRUBIN,DIRECT: 0 MG/DL (ref 0–0.2)
BILIRUBIN,TOTAL: 1.6 MG/DL (ref 0.2–1.3)
BLOOD UREA NITROGEN: 9 MG/DL (ref 7–20)
CALCIUM: 8.3 MG/DL (ref 8.4–10.2)
CARBON DIOXIDE: 28 MMOL/L (ref 21–31)
CHLORIDE: 103 MMOL/L (ref 97–110)
CK INDEX: 3.9
CK-MB: 1.2 NG/ML (ref 0–2.4)
CREATINE KINASE: 31 IU/L (ref 23–200)
CREATININE: 0.55 MG/DL (ref 0.61–1.24)
EOSINOPHILS #: 0 10^3/UL (ref 0–0.5)
EOSINOPHILS %: 0 % (ref 0–7)
GLOBULIN: 3.6 G/DL (ref 1.3–3.2)
GLUCOSE: 104 MG/DL (ref 70–220)
HEMATOCRIT: 44.9 % (ref 42–52)
HEMOGLOBIN: 15.1 G/DL (ref 14–18)
LYMPHOCYTES #: 0.4 10^3/UL (ref 0.8–2.9)
LYMPHOCYTES %: 7 % (ref 15–51)
MAGNESIUM: 1.6 MG/DL (ref 1.7–2.5)
MEAN CORPUSCULAR HEMOGLOBIN: 30.6 PG (ref 29–33)
MEAN CORPUSCULAR HGB CONC: 33.6 G/DL (ref 32–37)
MEAN CORPUSCULAR VOLUME: 90.9 FL (ref 82–101)
MEAN PLATELET VOLUME: 10.9 FL (ref 7.4–10.4)
MONOCYTE #: 0.5 10^3/UL (ref 0.3–0.9)
MONOCYTES %: 10.4 % (ref 0–11)
NEUTROPHIL #: 4.1 10^3/UL (ref 1.6–7.5)
NEUTROPHILS %: 82 % (ref 39–77)
NUCLEATED RED BLOOD CELLS #: 0 10^3/UL (ref 0–0)
NUCLEATED RED BLOOD CELLS%: 0 /100WBC (ref 0–0)
PLATELET COUNT: 148 10^3/UL (ref 140–415)
POSITIVE DIFF: (no result)
POTASSIUM: 3.5 MMOL/L (ref 3.5–5.1)
RED BLOOD COUNT: 4.94 10^6/UL (ref 4.7–6.1)
RED CELL DISTRIBUTION WIDTH: 14.1 % (ref 11.5–14.5)
SODIUM: 138 MMOL/L (ref 135–144)
TOTAL PROTEIN: 7.3 G/DL (ref 6.1–8.1)
TROPONIN-I: < 0.012 NG/ML (ref 0–0.12)
WHITE BLOOD COUNT: 5 10^3/UL (ref 4.8–10.8)

## 2018-01-09 RX ADMIN — DILTIAZEM HYDROCHLORIDE 1 MG: 180 CAPSULE, COATED, EXTENDED RELEASE ORAL at 09:11

## 2018-01-09 RX ADMIN — ATORVASTATIN CALCIUM 1 MG: 20 TABLET, FILM COATED ORAL at 20:49

## 2018-01-09 RX ADMIN — APIXABAN 1 MG: 5 TABLET, FILM COATED ORAL at 09:13

## 2018-01-09 RX ADMIN — ONDANSETRON HYDROCHLORIDE 1 MG: 2 INJECTION, SOLUTION INTRAMUSCULAR; INTRAVENOUS at 15:14

## 2018-01-09 RX ADMIN — ONDANSETRON HYDROCHLORIDE 1 MG: 2 INJECTION, SOLUTION INTRAMUSCULAR; INTRAVENOUS at 09:19

## 2018-01-09 RX ADMIN — POTASSIUM CHLORIDE 1 MEQ: 10 TABLET, EXTENDED RELEASE ORAL at 15:15

## 2018-01-09 RX ADMIN — PANTOPRAZOLE SODIUM 1 MG: 40 TABLET, DELAYED RELEASE ORAL at 09:19

## 2018-01-09 RX ADMIN — ONDANSETRON HYDROCHLORIDE 1 MG: 2 INJECTION, SOLUTION INTRAMUSCULAR; INTRAVENOUS at 02:28

## 2018-01-09 RX ADMIN — ASPIRIN 1 MG: 81 TABLET, COATED ORAL at 09:13

## 2018-01-09 RX ADMIN — SUCRALFATE 1 GM: 1 TABLET ORAL at 09:12

## 2018-01-09 RX ADMIN — ONDANSETRON HYDROCHLORIDE 1 MG: 2 INJECTION, SOLUTION INTRAMUSCULAR; INTRAVENOUS at 20:50

## 2018-01-09 RX ADMIN — METOPROLOL TARTRATE 1 MG: 100 TABLET ORAL at 20:50

## 2018-01-09 RX ADMIN — BUSPIRONE HYDROCHLORIDE 1 MG: 5 TABLET ORAL at 20:50

## 2018-01-09 RX ADMIN — SUCRALFATE 1 GM: 1 TABLET ORAL at 20:49

## 2018-01-09 RX ADMIN — BUSPIRONE HYDROCHLORIDE 1 MG: 5 TABLET ORAL at 09:10

## 2018-01-09 RX ADMIN — LORAZEPAM 1 MG: 0.5 TABLET ORAL at 09:19

## 2018-01-09 RX ADMIN — METOPROLOL TARTRATE 1 MG: 100 TABLET ORAL at 15:16

## 2018-01-09 RX ADMIN — APIXABAN 1 MG: 5 TABLET, FILM COATED ORAL at 20:50

## 2018-01-10 LAB
ABNORMAL IP MESSAGE: 1
ADD MAN DIFF?: NO
ANION GAP: 12 (ref 8–16)
BASOPHIL #: 0 10^3/UL (ref 0–0.1)
BASOPHILS %: 0.2 % (ref 0–2)
BLOOD UREA NITROGEN: 14 MG/DL (ref 7–20)
CALCIUM: 9 MG/DL (ref 8.4–10.2)
CARBON DIOXIDE: 32 MMOL/L (ref 21–31)
CHLORIDE: 101 MMOL/L (ref 97–110)
CREATININE: 0.79 MG/DL (ref 0.61–1.24)
EOSINOPHILS #: 0 10^3/UL (ref 0–0.5)
EOSINOPHILS %: 0 % (ref 0–7)
GLUCOSE: 91 MG/DL (ref 70–220)
HEMATOCRIT: 46.7 % (ref 42–52)
HEMOGLOBIN: 15.3 G/DL (ref 14–18)
LYMPHOCYTES #: 0.3 10^3/UL (ref 0.8–2.9)
LYMPHOCYTES %: 8.4 % (ref 15–51)
MAGNESIUM: 1.8 MG/DL (ref 1.7–2.5)
MEAN CORPUSCULAR HEMOGLOBIN: 30.3 PG (ref 29–33)
MEAN CORPUSCULAR HGB CONC: 32.8 G/DL (ref 32–37)
MEAN CORPUSCULAR VOLUME: 92.5 FL (ref 82–101)
MEAN PLATELET VOLUME: 11.8 FL (ref 7.4–10.4)
MONOCYTE #: 0.4 10^3/UL (ref 0.3–0.9)
MONOCYTES %: 9.4 % (ref 0–11)
NEUTROPHIL #: 3.3 10^3/UL (ref 1.6–7.5)
NEUTROPHILS %: 81.8 % (ref 39–77)
NUCLEATED RED BLOOD CELLS #: 0 10^3/UL (ref 0–0)
NUCLEATED RED BLOOD CELLS%: 0 /100WBC (ref 0–0)
PLATELET COUNT: 139 10^3/UL (ref 140–415)
POSITIVE DIFF: (no result)
POTASSIUM: 3.9 MMOL/L (ref 3.5–5.1)
RED BLOOD COUNT: 5.05 10^6/UL (ref 4.7–6.1)
RED CELL DISTRIBUTION WIDTH: 14.1 % (ref 11.5–14.5)
SODIUM: 141 MMOL/L (ref 135–144)
WHITE BLOOD COUNT: 4.1 10^3/UL (ref 4.8–10.8)

## 2018-01-10 RX ADMIN — METOPROLOL TARTRATE 1 MG: 100 TABLET ORAL at 10:17

## 2018-01-10 RX ADMIN — BUSPIRONE HYDROCHLORIDE 1 MG: 5 TABLET ORAL at 21:18

## 2018-01-10 RX ADMIN — BUSPIRONE HYDROCHLORIDE 1 MG: 5 TABLET ORAL at 10:16

## 2018-01-10 RX ADMIN — PANTOPRAZOLE SODIUM 1 MG: 40 TABLET, DELAYED RELEASE ORAL at 10:16

## 2018-01-10 RX ADMIN — MAGNESIUM SULFATE HEPTAHYDRATE 1 MLS/HR: 40 INJECTION, SOLUTION INTRAVENOUS at 17:55

## 2018-01-10 RX ADMIN — ASPIRIN 1 MG: 81 TABLET, COATED ORAL at 10:16

## 2018-01-10 RX ADMIN — METOPROLOL TARTRATE 1 MG: 100 TABLET ORAL at 21:18

## 2018-01-10 RX ADMIN — SUCRALFATE 1 GM: 1 TABLET ORAL at 10:16

## 2018-01-10 RX ADMIN — ATORVASTATIN CALCIUM 1 MG: 20 TABLET, FILM COATED ORAL at 21:18

## 2018-01-10 RX ADMIN — APIXABAN 1 MG: 5 TABLET, FILM COATED ORAL at 10:16

## 2018-01-10 RX ADMIN — DILTIAZEM HYDROCHLORIDE 1 MG: 180 CAPSULE, COATED, EXTENDED RELEASE ORAL at 10:17

## 2018-01-10 RX ADMIN — METOCLOPRAMIDE HYDROCHLORIDE 1 MG: 5 TABLET ORAL at 19:00

## 2018-01-10 RX ADMIN — APIXABAN 1 MG: 5 TABLET, FILM COATED ORAL at 21:18

## 2018-01-10 RX ADMIN — SUCRALFATE 1 GM: 1 TABLET ORAL at 21:18

## 2018-01-11 RX ADMIN — ASPIRIN 1 MG: 81 TABLET, COATED ORAL at 11:02

## 2018-01-11 RX ADMIN — APIXABAN 1 MG: 5 TABLET, FILM COATED ORAL at 11:02

## 2018-01-11 RX ADMIN — PANTOPRAZOLE SODIUM 1 MG: 40 TABLET, DELAYED RELEASE ORAL at 11:02

## 2018-01-11 RX ADMIN — METOCLOPRAMIDE HYDROCHLORIDE 1 MG: 5 TABLET ORAL at 00:00

## 2018-01-11 RX ADMIN — BUSPIRONE HYDROCHLORIDE 1 MG: 5 TABLET ORAL at 11:04

## 2018-01-11 RX ADMIN — DILTIAZEM HYDROCHLORIDE 1 MG: 240 CAPSULE, EXTENDED RELEASE ORAL at 11:04

## 2018-01-11 RX ADMIN — METOCLOPRAMIDE HYDROCHLORIDE 1 MG: 5 TABLET ORAL at 05:32

## 2018-02-10 ENCOUNTER — HOSPITAL ENCOUNTER (EMERGENCY)
Dept: HOSPITAL 91 - E/R | Age: 78
Discharge: HOME | End: 2018-02-10
Payer: MEDICARE

## 2018-02-10 ENCOUNTER — HOSPITAL ENCOUNTER (EMERGENCY)
Age: 78
Discharge: HOME | End: 2018-02-10

## 2018-02-10 DIAGNOSIS — Z79.82: ICD-10-CM

## 2018-02-10 DIAGNOSIS — R06.02: Primary | ICD-10-CM

## 2018-02-10 DIAGNOSIS — I10: ICD-10-CM

## 2018-02-10 DIAGNOSIS — R00.2: ICD-10-CM

## 2018-02-10 DIAGNOSIS — Z98.61: ICD-10-CM

## 2018-02-10 LAB
ABNORMAL IP MESSAGE: 1
ADD MAN DIFF?: NO
ALANINE AMINOTRANSFERASE: 30 IU/L (ref 13–69)
ALBUMIN/GLOBULIN RATIO: 1.26
ALBUMIN: 4.3 G/DL (ref 3.3–4.9)
ALKALINE PHOSPHATASE: 88 IU/L (ref 42–121)
ANION GAP: 14 (ref 8–16)
ASPARTATE AMINO TRANSFERASE: 37 IU/L (ref 15–46)
B-TYPE NATRIURETIC PEPTIDE: 2970 PG/ML (ref 0–450)
BASOPHIL #: 0 10^3/UL (ref 0–0.1)
BASOPHILS %: 0.9 % (ref 0–2)
BILIRUBIN,DIRECT: 0 MG/DL (ref 0–0.2)
BILIRUBIN,TOTAL: 1.7 MG/DL (ref 0.2–1.3)
BLOOD UREA NITROGEN: 11 MG/DL (ref 7–20)
CALCIUM: 8.9 MG/DL (ref 8.4–10.2)
CARBON DIOXIDE: 26 MMOL/L (ref 21–31)
CHLORIDE: 105 MMOL/L (ref 97–110)
CK INDEX: 2.5
CK-MB: 1.11 NG/ML (ref 0–2.4)
CREATINE KINASE: 44 IU/L (ref 23–200)
CREATININE: 0.69 MG/DL (ref 0.61–1.24)
EOSINOPHILS #: 0 10^3/UL (ref 0–0.5)
EOSINOPHILS %: 0.3 % (ref 0–7)
GLOBULIN: 3.4 G/DL (ref 1.3–3.2)
GLUCOSE: 101 MG/DL (ref 70–220)
HEMATOCRIT: 46.8 % (ref 42–52)
HEMOGLOBIN: 15.7 G/DL (ref 14–18)
LYMPHOCYTES #: 0.3 10^3/UL (ref 0.8–2.9)
LYMPHOCYTES %: 9.7 % (ref 15–51)
MEAN CORPUSCULAR HEMOGLOBIN: 30.1 PG (ref 29–33)
MEAN CORPUSCULAR HGB CONC: 33.5 G/DL (ref 32–37)
MEAN CORPUSCULAR VOLUME: 89.8 FL (ref 82–101)
MEAN PLATELET VOLUME: 11 FL (ref 7.4–10.4)
MONOCYTE #: 0.4 10^3/UL (ref 0.3–0.9)
MONOCYTES %: 12.2 % (ref 0–11)
NEUTROPHIL #: 2.7 10^3/UL (ref 1.6–7.5)
NEUTROPHILS %: 76.6 % (ref 39–77)
NUCLEATED RED BLOOD CELLS #: 0 10^3/UL (ref 0–0)
NUCLEATED RED BLOOD CELLS%: 0 /100WBC (ref 0–0)
PLATELET COUNT: 140 10^3/UL (ref 140–415)
POSITIVE DIFF: (no result)
POTASSIUM: 3.4 MMOL/L (ref 3.5–5.1)
RED BLOOD COUNT: 5.21 10^6/UL (ref 4.7–6.1)
RED CELL DISTRIBUTION WIDTH: 14.6 % (ref 11.5–14.5)
SODIUM: 142 MMOL/L (ref 135–144)
TOTAL PROTEIN: 7.7 G/DL (ref 6.1–8.1)
TROPONIN-I: < 0.012 NG/ML (ref 0–0.12)
WHITE BLOOD COUNT: 3.5 10^3/UL (ref 4.8–10.8)

## 2018-02-10 PROCEDURE — 80053 COMPREHEN METABOLIC PANEL: CPT

## 2018-02-10 PROCEDURE — 96374 THER/PROPH/DIAG INJ IV PUSH: CPT

## 2018-02-10 PROCEDURE — 84484 ASSAY OF TROPONIN QUANT: CPT

## 2018-02-10 PROCEDURE — 82550 ASSAY OF CK (CPK): CPT

## 2018-02-10 PROCEDURE — 93005 ELECTROCARDIOGRAM TRACING: CPT

## 2018-02-10 PROCEDURE — 94664 DEMO&/EVAL PT USE INHALER: CPT

## 2018-02-10 PROCEDURE — 85025 COMPLETE CBC W/AUTO DIFF WBC: CPT

## 2018-02-10 PROCEDURE — 82553 CREATINE MB FRACTION: CPT

## 2018-02-10 PROCEDURE — 83880 ASSAY OF NATRIURETIC PEPTIDE: CPT

## 2018-02-10 PROCEDURE — 71045 X-RAY EXAM CHEST 1 VIEW: CPT

## 2018-02-10 PROCEDURE — 99285 EMERGENCY DEPT VISIT HI MDM: CPT

## 2018-02-10 RX ADMIN — IPRATROPIUM BROMIDE 1 MG: 0.5 SOLUTION RESPIRATORY (INHALATION) at 19:12

## 2018-02-10 RX ADMIN — LORAZEPAM 1 MG: 2 INJECTION, SOLUTION INTRAMUSCULAR; INTRAVENOUS at 17:29

## 2018-02-10 RX ADMIN — ALBUTEROL SULFATE 1 MG: 2.5 SOLUTION RESPIRATORY (INHALATION) at 19:12

## 2018-05-13 ENCOUNTER — HOSPITAL ENCOUNTER (INPATIENT)
Dept: HOSPITAL 91 - MS4 | Age: 78
LOS: 7 days | Discharge: HOME | DRG: 392 | End: 2018-05-20
Payer: MEDICARE

## 2018-05-13 DIAGNOSIS — D69.6: ICD-10-CM

## 2018-05-13 DIAGNOSIS — Z79.01: ICD-10-CM

## 2018-05-13 DIAGNOSIS — Z87.891: ICD-10-CM

## 2018-05-13 DIAGNOSIS — Z72.89: ICD-10-CM

## 2018-05-13 DIAGNOSIS — I10: ICD-10-CM

## 2018-05-13 DIAGNOSIS — F41.9: ICD-10-CM

## 2018-05-13 DIAGNOSIS — I48.2: ICD-10-CM

## 2018-05-13 DIAGNOSIS — Z86.73: ICD-10-CM

## 2018-05-13 DIAGNOSIS — I42.1: ICD-10-CM

## 2018-05-13 DIAGNOSIS — K59.00: ICD-10-CM

## 2018-05-13 DIAGNOSIS — R19.5: ICD-10-CM

## 2018-05-13 DIAGNOSIS — K57.90: ICD-10-CM

## 2018-05-13 DIAGNOSIS — K29.70: ICD-10-CM

## 2018-05-13 DIAGNOSIS — K21.9: ICD-10-CM

## 2018-05-13 DIAGNOSIS — Z95.5: ICD-10-CM

## 2018-05-13 DIAGNOSIS — K30: Primary | ICD-10-CM

## 2018-05-13 DIAGNOSIS — D72.819: ICD-10-CM

## 2018-05-13 LAB
ABNORMAL IP MESSAGE: 1
ADD MAN DIFF?: NO
ALANINE AMINOTRANSFERASE: 34 IU/L (ref 13–69)
ALBUMIN/GLOBULIN RATIO: 1.12
ALBUMIN: 4.4 G/DL (ref 3.3–4.9)
ALKALINE PHOSPHATASE: 87 IU/L (ref 42–121)
AMYLASE: 51 U/L (ref 11–123)
ANION GAP: 22 (ref 8–16)
ASPARTATE AMINO TRANSFERASE: 64 IU/L (ref 15–46)
B-TYPE NATRIURETIC PEPTIDE: 2440 PG/ML (ref 0–450)
BASOPHIL #: 0 10^3/UL (ref 0–0.1)
BASOPHILS %: 0.7 % (ref 0–2)
BILIRUBIN,DIRECT: 0 MG/DL (ref 0–0.2)
BILIRUBIN,TOTAL: 1.7 MG/DL (ref 0.2–1.3)
BLOOD UREA NITROGEN: 11 MG/DL (ref 7–20)
CALCIUM: 9 MG/DL (ref 8.4–10.2)
CARBON DIOXIDE: 26 MMOL/L (ref 21–31)
CHLORIDE: 102 MMOL/L (ref 97–110)
CREATININE: 0.75 MG/DL (ref 0.61–1.24)
EOSINOPHILS #: 0 10^3/UL (ref 0–0.5)
EOSINOPHILS %: 0 % (ref 0–7)
GLOBULIN: 3.9 G/DL (ref 1.3–3.2)
GLUCOSE: 89 MG/DL (ref 70–220)
HEMATOCRIT: 47.6 % (ref 42–52)
HEMOGLOBIN: 15.7 G/DL (ref 14–18)
INR: 1.38
LIPASE: 134 U/L (ref 23–300)
LYMPHOCYTES #: 0.5 10^3/UL (ref 0.8–2.9)
LYMPHOCYTES %: 12.8 % (ref 15–51)
MEAN CORPUSCULAR HEMOGLOBIN: 29.8 PG (ref 29–33)
MEAN CORPUSCULAR HGB CONC: 33 G/DL (ref 32–37)
MEAN CORPUSCULAR VOLUME: 90.3 FL (ref 82–101)
MEAN PLATELET VOLUME: 11.5 FL (ref 7.4–10.4)
MONOCYTE #: 0.4 10^3/UL (ref 0.3–0.9)
MONOCYTES %: 10.4 % (ref 0–11)
NEUTROPHIL #: 3.2 10^3/UL (ref 1.6–7.5)
NEUTROPHILS %: 75.9 % (ref 39–77)
NUCLEATED RED BLOOD CELLS #: 0 10^3/UL (ref 0–0)
NUCLEATED RED BLOOD CELLS%: 0 /100WBC (ref 0–0)
PARTIAL THROMBOPLASTIN TIME: 35.6 SEC (ref 25–35)
PLATELET COUNT: 131 10^3/UL (ref 140–415)
POSITIVE DIFF: (no result)
POTASSIUM: 3.9 MMOL/L (ref 3.5–5.1)
PROTIME: 17.2 SEC (ref 11.9–14.9)
PT RATIO: 1.3
RED BLOOD COUNT: 5.27 10^6/UL (ref 4.7–6.1)
RED CELL DISTRIBUTION WIDTH: 14.1 % (ref 11.5–14.5)
SODIUM: 146 MMOL/L (ref 135–144)
TOTAL PROTEIN: 8.3 G/DL (ref 6.1–8.1)
TROPONIN-I: < 0.012 NG/ML (ref 0–0.12)
WHITE BLOOD COUNT: 4.2 10^3/UL (ref 4.8–10.8)

## 2018-05-13 PROCEDURE — 84443 ASSAY THYROID STIM HORMONE: CPT

## 2018-05-13 PROCEDURE — 99285 EMERGENCY DEPT VISIT HI MDM: CPT

## 2018-05-13 PROCEDURE — 80053 COMPREHEN METABOLIC PANEL: CPT

## 2018-05-13 PROCEDURE — 96375 TX/PRO/DX INJ NEW DRUG ADDON: CPT

## 2018-05-13 PROCEDURE — 82270 OCCULT BLOOD FECES: CPT

## 2018-05-13 PROCEDURE — 80048 BASIC METABOLIC PNL TOTAL CA: CPT

## 2018-05-13 PROCEDURE — 82962 GLUCOSE BLOOD TEST: CPT

## 2018-05-13 PROCEDURE — 84436 ASSAY OF TOTAL THYROXINE: CPT

## 2018-05-13 PROCEDURE — 74177 CT ABD & PELVIS W/CONTRAST: CPT

## 2018-05-13 PROCEDURE — 84484 ASSAY OF TROPONIN QUANT: CPT

## 2018-05-13 PROCEDURE — 83690 ASSAY OF LIPASE: CPT

## 2018-05-13 PROCEDURE — 71045 X-RAY EXAM CHEST 1 VIEW: CPT

## 2018-05-13 PROCEDURE — 85730 THROMBOPLASTIN TIME PARTIAL: CPT

## 2018-05-13 PROCEDURE — 93005 ELECTROCARDIOGRAM TRACING: CPT

## 2018-05-13 PROCEDURE — 85025 COMPLETE CBC W/AUTO DIFF WBC: CPT

## 2018-05-13 PROCEDURE — 83735 ASSAY OF MAGNESIUM: CPT

## 2018-05-13 PROCEDURE — 82150 ASSAY OF AMYLASE: CPT

## 2018-05-13 PROCEDURE — 85610 PROTHROMBIN TIME: CPT

## 2018-05-13 PROCEDURE — 96376 TX/PRO/DX INJ SAME DRUG ADON: CPT

## 2018-05-13 PROCEDURE — 96374 THER/PROPH/DIAG INJ IV PUSH: CPT

## 2018-05-13 PROCEDURE — 83880 ASSAY OF NATRIURETIC PEPTIDE: CPT

## 2018-05-13 PROCEDURE — 87040 BLOOD CULTURE FOR BACTERIA: CPT

## 2018-05-13 RX ADMIN — VASOPRESSIN 1: 20 INJECTION, SOLUTION INTRAMUSCULAR; SUBCUTANEOUS at 17:10

## 2018-05-13 RX ADMIN — IOHEXOL 1 ML: 300 INJECTION, SOLUTION INTRAVENOUS at 17:11

## 2018-05-13 RX ADMIN — APIXABAN 1 MG: 5 TABLET, FILM COATED ORAL at 22:30

## 2018-05-13 RX ADMIN — BUSPIRONE HYDROCHLORIDE 1 MG: 5 TABLET ORAL at 22:30

## 2018-05-13 RX ADMIN — ONDANSETRON HYDROCHLORIDE 1 MG: 2 INJECTION, SOLUTION INTRAMUSCULAR; INTRAVENOUS at 23:05

## 2018-05-13 RX ADMIN — SODIUM CHLORIDE 1 ML: 9 INJECTION, SOLUTION INTRAMUSCULAR; INTRAVENOUS; SUBCUTANEOUS at 17:10

## 2018-05-13 RX ADMIN — ONDANSETRON HYDROCHLORIDE 1 MG: 2 INJECTION, SOLUTION INTRAMUSCULAR; INTRAVENOUS at 16:47

## 2018-05-13 RX ADMIN — ONDANSETRON HYDROCHLORIDE 1 MG: 2 INJECTION, SOLUTION INTRAMUSCULAR; INTRAVENOUS at 20:00

## 2018-05-13 RX ADMIN — METOPROLOL TARTRATE 1 MG: 100 TABLET ORAL at 22:30

## 2018-05-13 RX ADMIN — HYDROMORPHONE HYDROCHLORIDE 1 MG: 2 INJECTION INTRAMUSCULAR; INTRAVENOUS; SUBCUTANEOUS at 20:00

## 2018-05-13 RX ADMIN — HYDROMORPHONE HYDROCHLORIDE 1 MG: 2 INJECTION INTRAMUSCULAR; INTRAVENOUS; SUBCUTANEOUS at 16:47

## 2018-05-13 RX ADMIN — ONDANSETRON HYDROCHLORIDE 1 MG: 2 INJECTION, SOLUTION INTRAMUSCULAR; INTRAVENOUS at 15:27

## 2018-05-13 RX ADMIN — MORPHINE SULFATE 1 MG: 2 INJECTION, SOLUTION INTRAMUSCULAR; INTRAVENOUS at 23:10

## 2018-05-13 RX ADMIN — SUCRALFATE 1 GM: 1 TABLET ORAL at 22:30

## 2018-05-13 RX ADMIN — THIAMINE HYDROCHLORIDE 1 MLS/HR: 100 INJECTION, SOLUTION INTRAMUSCULAR; INTRAVENOUS at 15:27

## 2018-05-14 ENCOUNTER — HOSPITAL ENCOUNTER (INPATIENT)
Age: 78
LOS: 6 days | Discharge: HOME | DRG: 392 | End: 2018-05-20

## 2018-05-14 LAB
ABNORMAL IP MESSAGE: 1
ADD MAN DIFF?: NO
ALANINE AMINOTRANSFERASE: 30 IU/L (ref 13–69)
ALBUMIN/GLOBULIN RATIO: 1.11
ALBUMIN: 4 G/DL (ref 3.3–4.9)
ALKALINE PHOSPHATASE: 76 IU/L (ref 42–121)
AMYLASE: 41 U/L (ref 11–123)
ANION GAP: 21 (ref 8–16)
ASPARTATE AMINO TRANSFERASE: 52 IU/L (ref 15–46)
BASOPHIL #: 0 10^3/UL (ref 0–0.1)
BASOPHILS %: 0.3 % (ref 0–2)
BILIRUBIN,DIRECT: 0 MG/DL (ref 0–0.2)
BILIRUBIN,TOTAL: 1.2 MG/DL (ref 0.2–1.3)
BLOOD UREA NITROGEN: 10 MG/DL (ref 7–20)
CALCIUM: 8.6 MG/DL (ref 8.4–10.2)
CARBON DIOXIDE: 25 MMOL/L (ref 21–31)
CHLORIDE: 104 MMOL/L (ref 97–110)
CREATININE: 0.64 MG/DL (ref 0.61–1.24)
EOSINOPHILS #: 0 10^3/UL (ref 0–0.5)
EOSINOPHILS %: 0 % (ref 0–7)
GLOBULIN: 3.6 G/DL (ref 1.3–3.2)
GLUCOSE: 127 MG/DL (ref 70–220)
HEMATOCRIT: 44.3 % (ref 42–52)
HEMOGLOBIN: 14.7 G/DL (ref 14–18)
LIPASE: 36 U/L (ref 23–300)
LYMPHOCYTES #: 0.2 10^3/UL (ref 0.8–2.9)
LYMPHOCYTES %: 7.2 % (ref 15–51)
MEAN CORPUSCULAR HEMOGLOBIN: 29.7 PG (ref 29–33)
MEAN CORPUSCULAR HGB CONC: 33.2 G/DL (ref 32–37)
MEAN CORPUSCULAR VOLUME: 89.5 FL (ref 82–101)
MEAN PLATELET VOLUME: 11.5 FL (ref 7.4–10.4)
MONOCYTE #: 0.2 10^3/UL (ref 0.3–0.9)
MONOCYTES %: 7.2 % (ref 0–11)
NEUTROPHIL #: 2.7 10^3/UL (ref 1.6–7.5)
NEUTROPHILS %: 84.7 % (ref 39–77)
NUCLEATED RED BLOOD CELLS #: 0 10^3/UL (ref 0–0)
NUCLEATED RED BLOOD CELLS%: 0 /100WBC (ref 0–0)
PLATELET COUNT: 105 10^3/UL (ref 140–415)
POSITIVE DIFF: (no result)
POTASSIUM: 3.9 MMOL/L (ref 3.5–5.1)
RED BLOOD COUNT: 4.95 10^6/UL (ref 4.7–6.1)
RED CELL DISTRIBUTION WIDTH: 14.1 % (ref 11.5–14.5)
SODIUM: 146 MMOL/L (ref 135–144)
TOTAL PROTEIN: 7.6 G/DL (ref 6.1–8.1)
TROPONIN-I: < 0.012 NG/ML (ref 0–0.12)
WHITE BLOOD COUNT: 3.2 10^3/UL (ref 4.8–10.8)

## 2018-05-14 RX ADMIN — DICYCLOMINE HYDROCHLORIDE 1 MG: 10 CAPSULE ORAL at 01:13

## 2018-05-14 RX ADMIN — METOPROLOL TARTRATE 1 MG: 100 TABLET ORAL at 20:39

## 2018-05-14 RX ADMIN — SUCRALFATE 1 GM: 1 TABLET ORAL at 09:04

## 2018-05-14 RX ADMIN — BUSPIRONE HYDROCHLORIDE 1 MG: 5 TABLET ORAL at 20:38

## 2018-05-14 RX ADMIN — DILTIAZEM HYDROCHLORIDE 1 MG: 180 CAPSULE, COATED, EXTENDED RELEASE ORAL at 09:05

## 2018-05-14 RX ADMIN — MORPHINE SULFATE 1 MG: 2 INJECTION, SOLUTION INTRAMUSCULAR; INTRAVENOUS at 09:04

## 2018-05-14 RX ADMIN — BUSPIRONE HYDROCHLORIDE 1 MG: 5 TABLET ORAL at 09:04

## 2018-05-14 RX ADMIN — APIXABAN 1 MG: 5 TABLET, FILM COATED ORAL at 20:38

## 2018-05-14 RX ADMIN — ONDANSETRON HYDROCHLORIDE 1 MG: 2 INJECTION, SOLUTION INTRAMUSCULAR; INTRAVENOUS at 09:03

## 2018-05-14 RX ADMIN — APIXABAN 1 MG: 5 TABLET, FILM COATED ORAL at 09:04

## 2018-05-14 RX ADMIN — PANTOPRAZOLE SODIUM 1 MG: 40 TABLET, DELAYED RELEASE ORAL at 07:05

## 2018-05-14 RX ADMIN — ONDANSETRON HYDROCHLORIDE 1 MG: 2 INJECTION, SOLUTION INTRAMUSCULAR; INTRAVENOUS at 01:13

## 2018-05-14 RX ADMIN — SUCRALFATE 1 GM: 1 TABLET ORAL at 20:38

## 2018-05-14 RX ADMIN — ATORVASTATIN CALCIUM 1 MG: 20 TABLET, FILM COATED ORAL at 20:38

## 2018-05-14 RX ADMIN — MORPHINE SULFATE 1 MG: 2 INJECTION, SOLUTION INTRAMUSCULAR; INTRAVENOUS at 20:38

## 2018-05-14 RX ADMIN — METOPROLOL TARTRATE 1 MG: 100 TABLET ORAL at 09:05

## 2018-05-14 RX ADMIN — LORAZEPAM 1 MG: 0.5 TABLET ORAL at 09:03

## 2018-05-14 RX ADMIN — ASPIRIN 1 MG: 81 TABLET, COATED ORAL at 09:04

## 2018-05-14 RX ADMIN — LORAZEPAM 1 MG: 0.5 TABLET ORAL at 20:38

## 2018-05-15 LAB
ABNORMAL IP MESSAGE: 1
ADD MAN DIFF?: NO
ANION GAP: 17 (ref 8–16)
BASOPHIL #: 0 10^3/UL (ref 0–0.1)
BASOPHILS %: 0.4 % (ref 0–2)
BLOOD UREA NITROGEN: 10 MG/DL (ref 7–20)
CALCIUM: 8.6 MG/DL (ref 8.4–10.2)
CARBON DIOXIDE: 27 MMOL/L (ref 21–31)
CHLORIDE: 101 MMOL/L (ref 97–110)
CREATININE: 0.69 MG/DL (ref 0.61–1.24)
EOSINOPHILS #: 0 10^3/UL (ref 0–0.5)
EOSINOPHILS %: 0.4 % (ref 0–7)
GLUCOSE: 92 MG/DL (ref 70–220)
HEMATOCRIT: 46.4 % (ref 42–52)
HEMOGLOBIN: 14.9 G/DL (ref 14–18)
LYMPHOCYTES #: 0.6 10^3/UL (ref 0.8–2.9)
LYMPHOCYTES %: 11.4 % (ref 15–51)
MAGNESIUM: 2 MG/DL (ref 1.7–2.5)
MEAN CORPUSCULAR HEMOGLOBIN: 29.6 PG (ref 29–33)
MEAN CORPUSCULAR HGB CONC: 32.1 G/DL (ref 32–37)
MEAN CORPUSCULAR VOLUME: 92.1 FL (ref 82–101)
MEAN PLATELET VOLUME: 11.9 FL (ref 7.4–10.4)
MONOCYTE #: 0.5 10^3/UL (ref 0.3–0.9)
MONOCYTES %: 10.1 % (ref 0–11)
NEUTROPHIL #: 3.8 10^3/UL (ref 1.6–7.5)
NEUTROPHILS %: 77.5 % (ref 39–77)
NUCLEATED RED BLOOD CELLS #: 0 10^3/UL (ref 0–0)
NUCLEATED RED BLOOD CELLS%: 0 /100WBC (ref 0–0)
PLATELET COUNT: 108 10^3/UL (ref 140–415)
POSITIVE DIFF: (no result)
POTASSIUM: 3.1 MMOL/L (ref 3.5–5.1)
RED BLOOD COUNT: 5.04 10^6/UL (ref 4.7–6.1)
RED CELL DISTRIBUTION WIDTH: 14.5 % (ref 11.5–14.5)
SODIUM: 142 MMOL/L (ref 135–144)
WHITE BLOOD COUNT: 4.9 10^3/UL (ref 4.8–10.8)

## 2018-05-15 RX ADMIN — APIXABAN 1 MG: 5 TABLET, FILM COATED ORAL at 20:16

## 2018-05-15 RX ADMIN — ASPIRIN 1 MG: 81 TABLET, COATED ORAL at 08:20

## 2018-05-15 RX ADMIN — BUSPIRONE HYDROCHLORIDE 1 MG: 5 TABLET ORAL at 20:17

## 2018-05-15 RX ADMIN — APIXABAN 1 MG: 5 TABLET, FILM COATED ORAL at 08:20

## 2018-05-15 RX ADMIN — SUCRALFATE 1 GM: 1 TABLET ORAL at 08:19

## 2018-05-15 RX ADMIN — PANTOPRAZOLE SODIUM 1 MG: 40 TABLET, DELAYED RELEASE ORAL at 06:13

## 2018-05-15 RX ADMIN — ATORVASTATIN CALCIUM 1 MG: 20 TABLET, FILM COATED ORAL at 20:16

## 2018-05-15 RX ADMIN — DILTIAZEM HYDROCHLORIDE 1 MG: 180 CAPSULE, COATED, EXTENDED RELEASE ORAL at 08:19

## 2018-05-15 RX ADMIN — SUCRALFATE 1 GM: 1 TABLET ORAL at 20:16

## 2018-05-15 RX ADMIN — METOPROLOL TARTRATE 1 MG: 100 TABLET ORAL at 08:20

## 2018-05-15 RX ADMIN — POTASSIUM CHLORIDE 1 MEQ: 10 TABLET, EXTENDED RELEASE ORAL at 12:38

## 2018-05-15 RX ADMIN — BUSPIRONE HYDROCHLORIDE 1 MG: 5 TABLET ORAL at 08:19

## 2018-05-15 RX ADMIN — METOPROLOL TARTRATE 1 MG: 100 TABLET ORAL at 20:16

## 2018-05-15 RX ADMIN — POTASSIUM CHLORIDE 1 MLS/HR: 200 INJECTION, SOLUTION INTRAVENOUS at 12:38

## 2018-05-15 RX ADMIN — DILTIAZEM HYDROCHLORIDE 1 MG: 180 CAPSULE, COATED, EXTENDED RELEASE ORAL at 20:17

## 2018-05-16 LAB
ABNORMAL IP MESSAGE: 1
ADD MAN DIFF?: NO
ANION GAP: 11 (ref 8–16)
BASOPHIL #: 0 10^3/UL (ref 0–0.1)
BASOPHILS %: 0.6 % (ref 0–2)
BLOOD UREA NITROGEN: 12 MG/DL (ref 7–20)
CALCIUM: 8.7 MG/DL (ref 8.4–10.2)
CARBON DIOXIDE: 32 MMOL/L (ref 21–31)
CHLORIDE: 103 MMOL/L (ref 97–110)
CREATININE: 0.71 MG/DL (ref 0.61–1.24)
EOSINOPHILS #: 0 10^3/UL (ref 0–0.5)
EOSINOPHILS %: 1.2 % (ref 0–7)
GLUCOSE: 98 MG/DL (ref 70–220)
HEMATOCRIT: 42.8 % (ref 42–52)
HEMOGLOBIN: 13.8 G/DL (ref 14–18)
LYMPHOCYTES #: 0.3 10^3/UL (ref 0.8–2.9)
LYMPHOCYTES %: 9.3 % (ref 15–51)
MAGNESIUM: 1.8 MG/DL (ref 1.7–2.5)
MEAN CORPUSCULAR HEMOGLOBIN: 29.9 PG (ref 29–33)
MEAN CORPUSCULAR HGB CONC: 32.2 G/DL (ref 32–37)
MEAN CORPUSCULAR VOLUME: 92.8 FL (ref 82–101)
MEAN PLATELET VOLUME: 12.2 FL (ref 7.4–10.4)
MONOCYTE #: 0.4 10^3/UL (ref 0.3–0.9)
MONOCYTES %: 12.4 % (ref 0–11)
NEUTROPHIL #: 2.5 10^3/UL (ref 1.6–7.5)
NEUTROPHILS %: 76.2 % (ref 39–77)
NUCLEATED RED BLOOD CELLS #: 0 10^3/UL (ref 0–0)
NUCLEATED RED BLOOD CELLS%: 0 /100WBC (ref 0–0)
PLATELET COUNT: 84 10^3/UL (ref 140–415)
POSITIVE DIFF: (no result)
POTASSIUM: 3.8 MMOL/L (ref 3.5–5.1)
RED BLOOD COUNT: 4.61 10^6/UL (ref 4.7–6.1)
RED CELL DISTRIBUTION WIDTH: 14.3 % (ref 11.5–14.5)
SODIUM: 142 MMOL/L (ref 135–144)
WHITE BLOOD COUNT: 3.2 10^3/UL (ref 4.8–10.8)

## 2018-05-16 RX ADMIN — PANTOPRAZOLE SODIUM 1 MG: 40 TABLET, DELAYED RELEASE ORAL at 05:24

## 2018-05-16 RX ADMIN — ASPIRIN 1 MG: 81 TABLET, COATED ORAL at 08:43

## 2018-05-16 RX ADMIN — MAGNESIUM SULFATE HEPTAHYDRATE 1 MLS/HR: 40 INJECTION, SOLUTION INTRAVENOUS at 12:46

## 2018-05-16 RX ADMIN — MAGNESIUM HYDROXIDE 1 ML: 400 SUSPENSION ORAL at 12:46

## 2018-05-16 RX ADMIN — SUCRALFATE 1 GM: 1 TABLET ORAL at 21:06

## 2018-05-16 RX ADMIN — MORPHINE SULFATE 1 MG: 10 SOLUTION ORAL at 17:53

## 2018-05-16 RX ADMIN — CARBOXYMETHYLCELLULOSE SODIUM 1 DROP: 10 GEL OPHTHALMIC at 23:58

## 2018-05-16 RX ADMIN — POTASSIUM CHLORIDE 1 MEQ: 10 TABLET, EXTENDED RELEASE ORAL at 12:47

## 2018-05-16 RX ADMIN — DILTIAZEM HYDROCHLORIDE 1 MG: 180 CAPSULE, COATED, EXTENDED RELEASE ORAL at 08:44

## 2018-05-16 RX ADMIN — METOPROLOL TARTRATE 1 MG: 100 TABLET ORAL at 08:44

## 2018-05-16 RX ADMIN — BUSPIRONE HYDROCHLORIDE 1 MG: 5 TABLET ORAL at 08:44

## 2018-05-16 RX ADMIN — LUBIPROSTONE 1 MCG: 24 CAPSULE, GELATIN COATED ORAL at 21:05

## 2018-05-16 RX ADMIN — METOPROLOL TARTRATE 1 MG: 100 TABLET ORAL at 21:07

## 2018-05-16 RX ADMIN — BUSPIRONE HYDROCHLORIDE 1 MG: 5 TABLET ORAL at 21:06

## 2018-05-16 RX ADMIN — DILTIAZEM HYDROCHLORIDE 1 MG: 180 CAPSULE, COATED, EXTENDED RELEASE ORAL at 21:07

## 2018-05-16 RX ADMIN — DOCUSATE SODIUM 1 MG: 100 CAPSULE, LIQUID FILLED ORAL at 12:46

## 2018-05-16 RX ADMIN — APIXABAN 1 MG: 5 TABLET, FILM COATED ORAL at 08:43

## 2018-05-16 RX ADMIN — ATORVASTATIN CALCIUM 1 MG: 20 TABLET, FILM COATED ORAL at 21:06

## 2018-05-16 RX ADMIN — SUCRALFATE 1 GM: 1 TABLET ORAL at 08:43

## 2018-05-16 RX ADMIN — APIXABAN 1 MG: 5 TABLET, FILM COATED ORAL at 21:06

## 2018-05-17 LAB
ABNORMAL IP MESSAGE: 1
ABNORMAL IP MESSAGE: 1
ADD MAN DIFF?: NO
ADD MAN DIFF?: NO
ANION GAP: 11 (ref 8–16)
BASOPHIL #: 0 10^3/UL (ref 0–0.1)
BASOPHIL #: 0 10^3/UL (ref 0–0.1)
BASOPHILS %: 0.3 % (ref 0–2)
BASOPHILS %: 0.6 % (ref 0–2)
BLOOD UREA NITROGEN: 13 MG/DL (ref 7–20)
CALCIUM: 8 MG/DL (ref 8.4–10.2)
CARBON DIOXIDE: 28 MMOL/L (ref 21–31)
CHLORIDE: 104 MMOL/L (ref 97–110)
CREATININE: 0.59 MG/DL (ref 0.61–1.24)
EOSINOPHILS #: 0.1 10^3/UL (ref 0–0.5)
EOSINOPHILS #: 0.1 10^3/UL (ref 0–0.5)
EOSINOPHILS %: 1.8 % (ref 0–7)
EOSINOPHILS %: 1.9 % (ref 0–7)
GLUCOSE: 84 MG/DL (ref 70–220)
HEMATOCRIT: 43.5 % (ref 42–52)
HEMATOCRIT: 43.9 % (ref 42–52)
HEMOGLOBIN: 14.1 G/DL (ref 14–18)
HEMOGLOBIN: 14.3 G/DL (ref 14–18)
LYMPHOCYTES #: 0.3 10^3/UL (ref 0.8–2.9)
LYMPHOCYTES #: 0.3 10^3/UL (ref 0.8–2.9)
LYMPHOCYTES %: 10.2 % (ref 15–51)
LYMPHOCYTES %: 9.6 % (ref 15–51)
MAGNESIUM: 2.3 MG/DL (ref 1.7–2.5)
MEAN CORPUSCULAR HEMOGLOBIN: 29.9 PG (ref 29–33)
MEAN CORPUSCULAR HEMOGLOBIN: 30.2 PG (ref 29–33)
MEAN CORPUSCULAR HGB CONC: 32.4 G/DL (ref 32–37)
MEAN CORPUSCULAR HGB CONC: 32.6 G/DL (ref 32–37)
MEAN CORPUSCULAR VOLUME: 92.4 FL (ref 82–101)
MEAN CORPUSCULAR VOLUME: 92.8 FL (ref 82–101)
MEAN PLATELET VOLUME: 11.5 FL (ref 7.4–10.4)
MEAN PLATELET VOLUME: 12.3 FL (ref 7.4–10.4)
MONOCYTE #: 0.4 10^3/UL (ref 0.3–0.9)
MONOCYTE #: 0.5 10^3/UL (ref 0.3–0.9)
MONOCYTES %: 13.4 % (ref 0–11)
MONOCYTES %: 14.1 % (ref 0–11)
NEUTROPHIL #: 2.3 10^3/UL (ref 1.6–7.5)
NEUTROPHIL #: 2.5 10^3/UL (ref 1.6–7.5)
NEUTROPHILS %: 73.6 % (ref 39–77)
NEUTROPHILS %: 73.9 % (ref 39–77)
NUCLEATED RED BLOOD CELLS #: 0 10^3/UL (ref 0–0)
NUCLEATED RED BLOOD CELLS #: 0 10^3/UL (ref 0–0)
NUCLEATED RED BLOOD CELLS%: 0 /100WBC (ref 0–0)
NUCLEATED RED BLOOD CELLS%: 0 /100WBC (ref 0–0)
PLATELET COUNT: 102 10^3/UL (ref 140–415)
PLATELET COUNT: 95 10^3/UL (ref 140–415)
POSITIVE DIFF: (no result)
POSITIVE DIFF: (no result)
POTASSIUM: 3.8 MMOL/L (ref 3.5–5.1)
RED BLOOD COUNT: 4.71 10^6/UL (ref 4.7–6.1)
RED BLOOD COUNT: 4.73 10^6/UL (ref 4.7–6.1)
RED CELL DISTRIBUTION WIDTH: 14.4 % (ref 11.5–14.5)
RED CELL DISTRIBUTION WIDTH: 14.5 % (ref 11.5–14.5)
SODIUM: 139 MMOL/L (ref 135–144)
WHITE BLOOD COUNT: 3.1 10^3/UL (ref 4.8–10.8)
WHITE BLOOD COUNT: 3.3 10^3/UL (ref 4.8–10.8)

## 2018-05-17 RX ADMIN — DICYCLOMINE HYDROCHLORIDE 1 MG: 10 CAPSULE ORAL at 12:01

## 2018-05-17 RX ADMIN — METOPROLOL TARTRATE 1 MG: 100 TABLET ORAL at 08:32

## 2018-05-17 RX ADMIN — DICYCLOMINE HYDROCHLORIDE 1 MG: 10 CAPSULE ORAL at 17:16

## 2018-05-17 RX ADMIN — DICYCLOMINE HYDROCHLORIDE 1 MG: 10 CAPSULE ORAL at 07:30

## 2018-05-17 RX ADMIN — LUBIPROSTONE 1 MCG: 24 CAPSULE, GELATIN COATED ORAL at 08:32

## 2018-05-17 RX ADMIN — DILTIAZEM HYDROCHLORIDE 1 MG: 180 CAPSULE, COATED, EXTENDED RELEASE ORAL at 08:32

## 2018-05-17 RX ADMIN — SUCRALFATE 1 GM: 1 TABLET ORAL at 20:34

## 2018-05-17 RX ADMIN — ASPIRIN 1 MG: 81 TABLET, COATED ORAL at 08:32

## 2018-05-17 RX ADMIN — PANTOPRAZOLE SODIUM 1 MG: 40 TABLET, DELAYED RELEASE ORAL at 05:45

## 2018-05-17 RX ADMIN — POTASSIUM CHLORIDE 1 MEQ: 1500 TABLET, EXTENDED RELEASE ORAL at 13:14

## 2018-05-17 RX ADMIN — METOPROLOL TARTRATE 1 MG: 100 TABLET ORAL at 20:35

## 2018-05-17 RX ADMIN — APIXABAN 1 MG: 5 TABLET, FILM COATED ORAL at 08:32

## 2018-05-17 RX ADMIN — LUBIPROSTONE 1 MCG: 24 CAPSULE, GELATIN COATED ORAL at 20:35

## 2018-05-17 RX ADMIN — LUBIPROSTONE 1 MCG: 24 CAPSULE, GELATIN COATED ORAL at 20:34

## 2018-05-17 RX ADMIN — BUSPIRONE HYDROCHLORIDE 1 MG: 5 TABLET ORAL at 08:32

## 2018-05-17 RX ADMIN — DOCUSATE SODIUM 1 MG: 100 CAPSULE, LIQUID FILLED ORAL at 08:32

## 2018-05-17 RX ADMIN — SUCRALFATE 1 GM: 1 TABLET ORAL at 08:32

## 2018-05-17 RX ADMIN — ATORVASTATIN CALCIUM 1 MG: 20 TABLET, FILM COATED ORAL at 20:33

## 2018-05-17 RX ADMIN — DILTIAZEM HYDROCHLORIDE 1 MG: 180 CAPSULE, COATED, EXTENDED RELEASE ORAL at 20:34

## 2018-05-17 RX ADMIN — BUSPIRONE HYDROCHLORIDE 1 MG: 5 TABLET ORAL at 20:33

## 2018-05-18 LAB
ABNORMAL IP MESSAGE: 1
ADD MAN DIFF?: NO
ANION GAP: 13 (ref 8–16)
BASOPHIL #: 0 10^3/UL (ref 0–0.1)
BASOPHILS %: 0.6 % (ref 0–2)
BLOOD UREA NITROGEN: 12 MG/DL (ref 7–20)
CALCIUM: 8.5 MG/DL (ref 8.4–10.2)
CARBON DIOXIDE: 25 MMOL/L (ref 21–31)
CHLORIDE: 107 MMOL/L (ref 97–110)
CREATININE: 0.55 MG/DL (ref 0.61–1.24)
EOSINOPHILS #: 0.1 10^3/UL (ref 0–0.5)
EOSINOPHILS %: 2.8 % (ref 0–7)
GLUCOSE: 94 MG/DL (ref 70–220)
HEMATOCRIT: 42.1 % (ref 42–52)
HEMOGLOBIN: 13.8 G/DL (ref 14–18)
LYMPHOCYTES #: 0.3 10^3/UL (ref 0.8–2.9)
LYMPHOCYTES %: 9.9 % (ref 15–51)
MAGNESIUM: 2.2 MG/DL (ref 1.7–2.5)
MEAN CORPUSCULAR HEMOGLOBIN: 30.1 PG (ref 29–33)
MEAN CORPUSCULAR HGB CONC: 32.8 G/DL (ref 32–37)
MEAN CORPUSCULAR VOLUME: 91.7 FL (ref 82–101)
MEAN PLATELET VOLUME: 11.8 FL (ref 7.4–10.4)
MONOCYTE #: 0.6 10^3/UL (ref 0.3–0.9)
MONOCYTES %: 18.9 % (ref 0–11)
NEUTROPHIL #: 2.2 10^3/UL (ref 1.6–7.5)
NEUTROPHILS %: 67.5 % (ref 39–77)
NUCLEATED RED BLOOD CELLS #: 0 10^3/UL (ref 0–0)
NUCLEATED RED BLOOD CELLS%: 0 /100WBC (ref 0–0)
PLATELET COUNT: 102 10^3/UL (ref 140–415)
POSITIVE DIFF: (no result)
POTASSIUM: 3.9 MMOL/L (ref 3.5–5.1)
RED BLOOD COUNT: 4.59 10^6/UL (ref 4.7–6.1)
RED CELL DISTRIBUTION WIDTH: 14.6 % (ref 11.5–14.5)
SODIUM: 141 MMOL/L (ref 135–144)
T4 (THYROXINE): 8.5 UG/DL (ref 5.5–11)
THYROID STIMULATING HORMONE: 3 MIU/L (ref 0.47–4.68)
WHITE BLOOD COUNT: 3.2 10^3/UL (ref 4.8–10.8)

## 2018-05-18 RX ADMIN — DICYCLOMINE HYDROCHLORIDE 1 MG: 10 CAPSULE ORAL at 17:26

## 2018-05-18 RX ADMIN — DICYCLOMINE HYDROCHLORIDE 1 MG: 10 CAPSULE ORAL at 12:33

## 2018-05-18 RX ADMIN — DICYCLOMINE HYDROCHLORIDE 1 MG: 10 CAPSULE ORAL at 08:25

## 2018-05-18 RX ADMIN — LUBIPROSTONE 1 MCG: 24 CAPSULE, GELATIN COATED ORAL at 20:46

## 2018-05-18 RX ADMIN — LUBIPROSTONE 1 MCG: 24 CAPSULE, GELATIN COATED ORAL at 08:25

## 2018-05-18 RX ADMIN — BUSPIRONE HYDROCHLORIDE 1 MG: 5 TABLET ORAL at 20:46

## 2018-05-18 RX ADMIN — DILTIAZEM HYDROCHLORIDE 1 MG: 180 CAPSULE, COATED, EXTENDED RELEASE ORAL at 20:45

## 2018-05-18 RX ADMIN — ATORVASTATIN CALCIUM 1 MG: 20 TABLET, FILM COATED ORAL at 20:45

## 2018-05-18 RX ADMIN — SUCRALFATE 1 GM: 1 TABLET ORAL at 20:45

## 2018-05-18 RX ADMIN — BUSPIRONE HYDROCHLORIDE 1 MG: 5 TABLET ORAL at 08:24

## 2018-05-18 RX ADMIN — METOPROLOL TARTRATE 1 MG: 100 TABLET ORAL at 20:46

## 2018-05-18 RX ADMIN — DILTIAZEM HYDROCHLORIDE 1 MG: 180 CAPSULE, COATED, EXTENDED RELEASE ORAL at 08:24

## 2018-05-18 RX ADMIN — METOPROLOL TARTRATE 1 MG: 100 TABLET ORAL at 08:24

## 2018-05-18 RX ADMIN — PANTOPRAZOLE SODIUM 1 MG: 40 TABLET, DELAYED RELEASE ORAL at 05:18

## 2018-05-18 RX ADMIN — SUCRALFATE 1 GM: 1 TABLET ORAL at 08:24

## 2018-05-19 LAB
ABNORMAL IP MESSAGE: 1
ADD MAN DIFF?: NO
ANION GAP: 13 (ref 8–16)
BASOPHIL #: 0 10^3/UL (ref 0–0.1)
BASOPHILS %: 0.6 % (ref 0–2)
BLOOD UREA NITROGEN: 12 MG/DL (ref 7–20)
CALCIUM: 8.6 MG/DL (ref 8.4–10.2)
CARBON DIOXIDE: 29 MMOL/L (ref 21–31)
CHLORIDE: 106 MMOL/L (ref 97–110)
CREATININE: 0.7 MG/DL (ref 0.61–1.24)
EOSINOPHILS #: 0.1 10^3/UL (ref 0–0.5)
EOSINOPHILS %: 2.5 % (ref 0–7)
GLUCOSE: 92 MG/DL (ref 70–220)
HEMATOCRIT: 43.1 % (ref 42–52)
HEMOGLOBIN: 13.9 G/DL (ref 14–18)
LYMPHOCYTES #: 0.4 10^3/UL (ref 0.8–2.9)
LYMPHOCYTES %: 12.1 % (ref 15–51)
MEAN CORPUSCULAR HEMOGLOBIN: 29.8 PG (ref 29–33)
MEAN CORPUSCULAR HGB CONC: 32.3 G/DL (ref 32–37)
MEAN CORPUSCULAR VOLUME: 92.5 FL (ref 82–101)
MEAN PLATELET VOLUME: 11.2 FL (ref 7.4–10.4)
MONOCYTE #: 0.7 10^3/UL (ref 0.3–0.9)
MONOCYTES %: 20.9 % (ref 0–11)
NEUTROPHIL #: 2 10^3/UL (ref 1.6–7.5)
NEUTROPHILS %: 63.6 % (ref 39–77)
NUCLEATED RED BLOOD CELLS #: 0 10^3/UL (ref 0–0)
NUCLEATED RED BLOOD CELLS%: 0 /100WBC (ref 0–0)
OCCULT BLOOD STOOL: NEGATIVE
PLATELET COUNT: 115 10^3/UL (ref 140–415)
POSITIVE DIFF: (no result)
POTASSIUM: 4.1 MMOL/L (ref 3.5–5.1)
RED BLOOD COUNT: 4.66 10^6/UL (ref 4.7–6.1)
RED CELL DISTRIBUTION WIDTH: 14.9 % (ref 11.5–14.5)
SODIUM: 144 MMOL/L (ref 135–144)
WHITE BLOOD COUNT: 3.2 10^3/UL (ref 4.8–10.8)

## 2018-05-19 RX ADMIN — LUBIPROSTONE 1 MCG: 8 CAPSULE, GELATIN COATED ORAL at 09:00

## 2018-05-19 RX ADMIN — PANTOPRAZOLE SODIUM 1 MG: 40 TABLET, DELAYED RELEASE ORAL at 05:15

## 2018-05-19 RX ADMIN — DICYCLOMINE HYDROCHLORIDE 1 MG: 10 CAPSULE ORAL at 12:03

## 2018-05-19 RX ADMIN — ATORVASTATIN CALCIUM 1 MG: 20 TABLET, FILM COATED ORAL at 20:42

## 2018-05-19 RX ADMIN — DILTIAZEM HYDROCHLORIDE 1 MG: 180 CAPSULE, COATED, EXTENDED RELEASE ORAL at 20:44

## 2018-05-19 RX ADMIN — SUCRALFATE 1 GM: 1 TABLET ORAL at 08:59

## 2018-05-19 RX ADMIN — BUSPIRONE HYDROCHLORIDE 1 MG: 5 TABLET ORAL at 20:45

## 2018-05-19 RX ADMIN — LUBIPROSTONE 1 MCG: 8 CAPSULE, GELATIN COATED ORAL at 20:42

## 2018-05-19 RX ADMIN — DILTIAZEM HYDROCHLORIDE 1 MG: 180 CAPSULE, COATED, EXTENDED RELEASE ORAL at 10:15

## 2018-05-19 RX ADMIN — DICYCLOMINE HYDROCHLORIDE 1 MG: 10 CAPSULE ORAL at 08:59

## 2018-05-19 RX ADMIN — METOPROLOL TARTRATE 1 MG: 100 TABLET ORAL at 20:43

## 2018-05-19 RX ADMIN — LUBIPROSTONE 1 MCG: 24 CAPSULE, GELATIN COATED ORAL at 08:59

## 2018-05-19 RX ADMIN — BUSPIRONE HYDROCHLORIDE 1 MG: 5 TABLET ORAL at 08:59

## 2018-05-19 RX ADMIN — DICYCLOMINE HYDROCHLORIDE 1 MG: 10 CAPSULE ORAL at 18:41

## 2018-05-19 RX ADMIN — SUCRALFATE 1 GM: 1 TABLET ORAL at 20:43

## 2018-05-19 RX ADMIN — DEXAMETHASONE 1 DROP: 1 SUSPENSION OPHTHALMIC at 13:53

## 2018-05-19 RX ADMIN — DEXAMETHASONE 1 DROP: 1 SUSPENSION OPHTHALMIC at 20:49

## 2018-05-19 RX ADMIN — METOPROLOL TARTRATE 1 MG: 100 TABLET ORAL at 09:01

## 2018-05-20 LAB
ABNORMAL IP MESSAGE: 1
ADD MAN DIFF?: NO
ANION GAP: 12 (ref 8–16)
BASOPHIL #: 0 10^3/UL (ref 0–0.1)
BASOPHILS %: 0.7 % (ref 0–2)
BLOOD UREA NITROGEN: 11 MG/DL (ref 7–20)
CALCIUM: 8.9 MG/DL (ref 8.4–10.2)
CARBON DIOXIDE: 28 MMOL/L (ref 21–31)
CHLORIDE: 107 MMOL/L (ref 97–110)
CREATININE: 0.67 MG/DL (ref 0.61–1.24)
EOSINOPHILS #: 0.1 10^3/UL (ref 0–0.5)
EOSINOPHILS %: 2.6 % (ref 0–7)
GLUCOSE: 94 MG/DL (ref 70–220)
HEMATOCRIT: 42.3 % (ref 42–52)
HEMOGLOBIN: 13.9 G/DL (ref 14–18)
LYMPHOCYTES #: 0.4 10^3/UL (ref 0.8–2.9)
LYMPHOCYTES %: 13.1 % (ref 15–51)
MAGNESIUM: 2 MG/DL (ref 1.7–2.5)
MEAN CORPUSCULAR HEMOGLOBIN: 30 PG (ref 29–33)
MEAN CORPUSCULAR HGB CONC: 32.9 G/DL (ref 32–37)
MEAN CORPUSCULAR VOLUME: 91.2 FL (ref 82–101)
MEAN PLATELET VOLUME: 10.9 FL (ref 7.4–10.4)
MONOCYTE #: 0.6 10^3/UL (ref 0.3–0.9)
MONOCYTES %: 22.4 % (ref 0–11)
NEUTROPHIL #: 1.6 10^3/UL (ref 1.6–7.5)
NEUTROPHILS %: 60.8 % (ref 39–77)
NUCLEATED RED BLOOD CELLS #: 0 10^3/UL (ref 0–0)
NUCLEATED RED BLOOD CELLS%: 0 /100WBC (ref 0–0)
PLATELET COUNT: 130 10^3/UL (ref 140–415)
POSITIVE DIFF: (no result)
POTASSIUM: 3.8 MMOL/L (ref 3.5–5.1)
RED BLOOD COUNT: 4.64 10^6/UL (ref 4.7–6.1)
RED CELL DISTRIBUTION WIDTH: 14.8 % (ref 11.5–14.5)
SODIUM: 143 MMOL/L (ref 135–144)
WHITE BLOOD COUNT: 2.7 10^3/UL (ref 4.8–10.8)

## 2018-05-20 RX ADMIN — DEXAMETHASONE 1 DROP: 1 SUSPENSION OPHTHALMIC at 08:14

## 2018-05-20 RX ADMIN — DICYCLOMINE HYDROCHLORIDE 1 MG: 10 CAPSULE ORAL at 13:04

## 2018-05-20 RX ADMIN — LUBIPROSTONE 1 MCG: 8 CAPSULE, GELATIN COATED ORAL at 08:14

## 2018-05-20 RX ADMIN — SUCRALFATE 1 GM: 1 TABLET ORAL at 08:37

## 2018-05-20 RX ADMIN — DICYCLOMINE HYDROCHLORIDE 1 MG: 10 CAPSULE ORAL at 08:14

## 2018-05-20 RX ADMIN — DILTIAZEM HYDROCHLORIDE 1 MG: 180 CAPSULE, COATED, EXTENDED RELEASE ORAL at 09:24

## 2018-05-20 RX ADMIN — PANTOPRAZOLE SODIUM 1 MG: 40 TABLET, DELAYED RELEASE ORAL at 05:28

## 2018-05-20 RX ADMIN — DEXAMETHASONE 1 DROP: 1 SUSPENSION OPHTHALMIC at 13:04

## 2018-05-20 RX ADMIN — BUSPIRONE HYDROCHLORIDE 1 MG: 5 TABLET ORAL at 08:38

## 2018-05-20 RX ADMIN — METOPROLOL TARTRATE 1 MG: 100 TABLET ORAL at 08:38

## 2018-08-16 ENCOUNTER — HOSPITAL ENCOUNTER (INPATIENT)
Dept: HOSPITAL 91 - E/R | Age: 78
LOS: 5 days | Discharge: HOME | DRG: 392 | End: 2018-08-21
Payer: MEDICARE

## 2018-08-16 DIAGNOSIS — I10: ICD-10-CM

## 2018-08-16 DIAGNOSIS — F32.9: ICD-10-CM

## 2018-08-16 DIAGNOSIS — K57.90: ICD-10-CM

## 2018-08-16 DIAGNOSIS — Z79.01: ICD-10-CM

## 2018-08-16 DIAGNOSIS — Z87.891: ICD-10-CM

## 2018-08-16 DIAGNOSIS — I42.1: ICD-10-CM

## 2018-08-16 DIAGNOSIS — E78.5: ICD-10-CM

## 2018-08-16 DIAGNOSIS — K21.9: ICD-10-CM

## 2018-08-16 DIAGNOSIS — F41.9: ICD-10-CM

## 2018-08-16 DIAGNOSIS — D72.819: ICD-10-CM

## 2018-08-16 DIAGNOSIS — Z79.82: ICD-10-CM

## 2018-08-16 DIAGNOSIS — Z86.73: ICD-10-CM

## 2018-08-16 DIAGNOSIS — Z95.5: ICD-10-CM

## 2018-08-16 DIAGNOSIS — K29.70: ICD-10-CM

## 2018-08-16 DIAGNOSIS — K58.9: Primary | ICD-10-CM

## 2018-08-16 DIAGNOSIS — I25.10: ICD-10-CM

## 2018-08-16 DIAGNOSIS — I48.2: ICD-10-CM

## 2018-08-16 DIAGNOSIS — D69.6: ICD-10-CM

## 2018-08-16 DIAGNOSIS — K80.20: ICD-10-CM

## 2018-08-16 LAB
ABNORMAL IP MESSAGE: 1
ADD MAN DIFF?: NO
ALANINE AMINOTRANSFERASE: 40 IU/L (ref 13–69)
ALBUMIN/GLOBULIN RATIO: 0.97
ALBUMIN: 4.3 G/DL (ref 3.3–4.9)
ALKALINE PHOSPHATASE: 85 IU/L (ref 42–121)
ANION GAP: 21 (ref 8–16)
ASPARTATE AMINO TRANSFERASE: 100 IU/L (ref 15–46)
BASOPHIL #: 0 10^3/UL (ref 0–0.1)
BASOPHILS %: 0.2 % (ref 0–2)
BILIRUBIN,DIRECT: 0 MG/DL (ref 0–0.2)
BILIRUBIN,TOTAL: 1.3 MG/DL (ref 0.2–1.3)
BLOOD UREA NITROGEN: 12 MG/DL (ref 7–20)
CALCIUM: 9.2 MG/DL (ref 8.4–10.2)
CARBON DIOXIDE: 25 MMOL/L (ref 21–31)
CHLORIDE: 106 MMOL/L (ref 97–110)
CREATININE: 0.76 MG/DL (ref 0.61–1.24)
EOSINOPHILS #: 0 10^3/UL (ref 0–0.5)
EOSINOPHILS %: 0 % (ref 0–7)
GLOBULIN: 4.4 G/DL (ref 1.3–3.2)
GLUCOSE: 109 MG/DL (ref 70–220)
HEMATOCRIT: 47.8 % (ref 42–52)
HEMOGLOBIN: 15.7 G/DL (ref 14–18)
IMMATURE GRANS #M: 0.01 10^3/UL (ref 0–0.03)
IMMATURE GRANS % (M): 0.2 %
INR: 1.14
LIPASE: 122 U/L (ref 23–300)
LYMPHOCYTES #: 0.5 10^3/UL (ref 0.8–2.9)
LYMPHOCYTES %: 11.2 % (ref 15–51)
MEAN CORPUSCULAR HEMOGLOBIN: 28.6 PG (ref 29–33)
MEAN CORPUSCULAR HGB CONC: 32.8 G/DL (ref 32–37)
MEAN CORPUSCULAR VOLUME: 87.2 FL (ref 82–101)
MEAN PLATELET VOLUME: 10.1 FL (ref 7.4–10.4)
MONOCYTE #: 0.4 10^3/UL (ref 0.3–0.9)
MONOCYTES %: 8.7 % (ref 0–11)
NEUTROPHIL #: 3.2 10^3/UL (ref 1.6–7.5)
NEUTROPHILS %: 79.7 % (ref 39–77)
NUCLEATED RED BLOOD CELLS #: 0 10^3/UL (ref 0–0)
NUCLEATED RED BLOOD CELLS%: 0 /100WBC (ref 0–0)
PARTIAL THROMBOPLASTIN TIME: 31.5 SEC (ref 25–35)
PLATELET COUNT: 152 10^3/UL (ref 140–415)
POSITIVE DIFF: (no result)
POTASSIUM: 3.9 MMOL/L (ref 3.5–5.1)
PROTIME: 14.8 SEC (ref 11.9–14.9)
PT RATIO: 1.2
RED BLOOD COUNT: 5.48 10^6/UL (ref 4.7–6.1)
RED CELL DISTRIBUTION WIDTH: 15.5 % (ref 11.5–14.5)
SODIUM: 148 MMOL/L (ref 135–144)
TOTAL PROTEIN: 8.7 G/DL (ref 6.1–8.1)
TROPONIN-I: < 0.01 NG/ML (ref 0–0.12)
WHITE BLOOD COUNT: 4 10^3/UL (ref 4.8–10.8)

## 2018-08-16 PROCEDURE — 74176 CT ABD & PELVIS W/O CONTRAST: CPT

## 2018-08-16 PROCEDURE — 96375 TX/PRO/DX INJ NEW DRUG ADDON: CPT

## 2018-08-16 PROCEDURE — 83690 ASSAY OF LIPASE: CPT

## 2018-08-16 PROCEDURE — 84484 ASSAY OF TROPONIN QUANT: CPT

## 2018-08-16 PROCEDURE — 85730 THROMBOPLASTIN TIME PARTIAL: CPT

## 2018-08-16 PROCEDURE — 85025 COMPLETE CBC W/AUTO DIFF WBC: CPT

## 2018-08-16 PROCEDURE — 96374 THER/PROPH/DIAG INJ IV PUSH: CPT

## 2018-08-16 PROCEDURE — 99217: CPT

## 2018-08-16 PROCEDURE — 83735 ASSAY OF MAGNESIUM: CPT

## 2018-08-16 PROCEDURE — 80048 BASIC METABOLIC PNL TOTAL CA: CPT

## 2018-08-16 PROCEDURE — 80053 COMPREHEN METABOLIC PANEL: CPT

## 2018-08-16 PROCEDURE — 93306 TTE W/DOPPLER COMPLETE: CPT

## 2018-08-16 PROCEDURE — 36415 COLL VENOUS BLD VENIPUNCTURE: CPT

## 2018-08-16 PROCEDURE — 99285 EMERGENCY DEPT VISIT HI MDM: CPT

## 2018-08-16 PROCEDURE — 71045 X-RAY EXAM CHEST 1 VIEW: CPT

## 2018-08-16 PROCEDURE — 85610 PROTHROMBIN TIME: CPT

## 2018-08-16 PROCEDURE — 93005 ELECTROCARDIOGRAM TRACING: CPT

## 2018-08-16 RX ADMIN — HYDROMORPHONE HYDROCHLORIDE 1 MG: 1 INJECTION, SOLUTION INTRAMUSCULAR; INTRAVENOUS; SUBCUTANEOUS at 14:32

## 2018-08-16 RX ADMIN — LORAZEPAM 1 MG: 2 INJECTION, SOLUTION INTRAMUSCULAR; INTRAVENOUS at 12:16

## 2018-08-16 RX ADMIN — APIXABAN 1 MG: 5 TABLET, FILM COATED ORAL at 20:17

## 2018-08-16 RX ADMIN — DILTIAZEM HYDROCHLORIDE 1 MG: 5 INJECTION INTRAVENOUS at 18:51

## 2018-08-16 RX ADMIN — METOPROLOL TARTRATE 1 MG: 100 TABLET ORAL at 20:17

## 2018-08-16 RX ADMIN — PHENOBARBITAL, HYOSCYAMINE SULFATE, ATROPINE SULFATE, SCOPOLAMINE HYDROBROMIDE 1 TAB: 16.2; .1037; .0194; .0065 TABLET ORAL at 11:29

## 2018-08-16 RX ADMIN — LIDOCAINE HYDROCHLORIDE 1 MLS/HR: 10 INJECTION, SOLUTION EPIDURAL; INFILTRATION; INTRACAUDAL; PERINEURAL at 11:29

## 2018-08-16 RX ADMIN — ONDANSETRON HYDROCHLORIDE 1 MG: 2 INJECTION, SOLUTION INTRAMUSCULAR; INTRAVENOUS at 11:29

## 2018-08-16 RX ADMIN — KETOROLAC TROMETHAMINE 1 MG: 15 INJECTION, SOLUTION INTRAMUSCULAR; INTRAVENOUS at 11:30

## 2018-08-16 RX ADMIN — SUCRALFATE 1 GM: 1 TABLET ORAL at 20:17

## 2018-08-16 RX ADMIN — THIAMINE HYDROCHLORIDE 1 MLS/HR: 100 INJECTION, SOLUTION INTRAMUSCULAR; INTRAVENOUS at 12:16

## 2018-08-16 RX ADMIN — ALUMINUM HYDROXIDE, MAGNESIUM HYDROXIDE, DIMETHICONE 1 ML: 200; 200; 20 SUSPENSION ORAL at 11:29

## 2018-08-16 RX ADMIN — PANTOPRAZOLE SODIUM 1 MG: 40 TABLET, DELAYED RELEASE ORAL at 18:50

## 2018-08-16 RX ADMIN — FAMOTIDINE 1 MG: 20 TABLET ORAL at 11:29

## 2018-08-16 RX ADMIN — LORAZEPAM 1 MG: 0.5 TABLET ORAL at 18:50

## 2018-08-17 LAB
ABNORMAL IP MESSAGE: 1
ADD MAN DIFF?: NO
ANION GAP: 13 (ref 8–16)
BASOPHIL #: 0 10^3/UL (ref 0–0.1)
BASOPHILS %: 0.3 % (ref 0–2)
BLOOD UREA NITROGEN: 12 MG/DL (ref 7–20)
CALCIUM: 8.4 MG/DL (ref 8.4–10.2)
CARBON DIOXIDE: 28 MMOL/L (ref 21–31)
CHLORIDE: 104 MMOL/L (ref 97–110)
CREATININE: 0.61 MG/DL (ref 0.61–1.24)
EOSINOPHILS #: 0 10^3/UL (ref 0–0.5)
EOSINOPHILS %: 0 % (ref 0–7)
GLUCOSE: 99 MG/DL (ref 70–220)
HEMATOCRIT: 44 % (ref 42–52)
HEMOGLOBIN: 14 G/DL (ref 14–18)
IMMATURE GRANS #M: 0.01 10^3/UL (ref 0–0.03)
IMMATURE GRANS % (M): 0.3 % (ref 0–0.43)
LYMPHOCYTES #: 0.3 10^3/UL (ref 0.8–2.9)
LYMPHOCYTES %: 9.3 % (ref 15–51)
MEAN CORPUSCULAR HEMOGLOBIN: 28.2 PG (ref 29–33)
MEAN CORPUSCULAR HGB CONC: 31.8 G/DL (ref 32–37)
MEAN CORPUSCULAR VOLUME: 88.7 FL (ref 82–101)
MEAN PLATELET VOLUME: 10.4 FL (ref 7.4–10.4)
MONOCYTE #: 0.4 10^3/UL (ref 0.3–0.9)
MONOCYTES %: 11.5 % (ref 0–11)
NEUTROPHIL #: 2.8 10^3/UL (ref 1.6–7.5)
NEUTROPHILS %: 78.6 % (ref 39–77)
NUCLEATED RED BLOOD CELLS #: 0 10^3/UL (ref 0–0)
NUCLEATED RED BLOOD CELLS%: 0 /100WBC (ref 0–0)
PLATELET COUNT: 114 10^3/UL (ref 140–415)
POSITIVE DIFF: (no result)
POTASSIUM: 3.8 MMOL/L (ref 3.5–5.1)
RED BLOOD COUNT: 4.96 10^6/UL (ref 4.7–6.1)
RED CELL DISTRIBUTION WIDTH: 15.7 % (ref 11.5–14.5)
SODIUM: 141 MMOL/L (ref 135–144)
TROPONIN-I: < 0.01 NG/ML (ref 0–0.12)
WHITE BLOOD COUNT: 3.6 10^3/UL (ref 4.8–10.8)

## 2018-08-17 RX ADMIN — SUCRALFATE 1 GM: 1 TABLET ORAL at 08:46

## 2018-08-17 RX ADMIN — LORAZEPAM 1 MG: 0.5 TABLET ORAL at 06:02

## 2018-08-17 RX ADMIN — PANTOPRAZOLE SODIUM 1 MG: 40 TABLET, DELAYED RELEASE ORAL at 06:02

## 2018-08-17 RX ADMIN — PANTOPRAZOLE SODIUM 1 MG: 40 TABLET, DELAYED RELEASE ORAL at 17:51

## 2018-08-17 RX ADMIN — APIXABAN 1 MG: 5 TABLET, FILM COATED ORAL at 20:21

## 2018-08-17 RX ADMIN — DILTIAZEM HYDROCHLORIDE 1 MG: 240 CAPSULE, EXTENDED RELEASE ORAL at 08:47

## 2018-08-17 RX ADMIN — ASPIRIN 81 MG CHEWABLE TABLET 1 MG: 81 TABLET CHEWABLE at 08:47

## 2018-08-17 RX ADMIN — LORAZEPAM 1 MG: 0.5 TABLET ORAL at 01:34

## 2018-08-17 RX ADMIN — METOPROLOL TARTRATE 1 MG: 100 TABLET ORAL at 08:48

## 2018-08-17 RX ADMIN — SUCRALFATE 1 GM: 1 TABLET ORAL at 20:25

## 2018-08-17 RX ADMIN — LORAZEPAM 1 MG: 0.5 TABLET ORAL at 20:23

## 2018-08-17 RX ADMIN — DILTIAZEM HYDROCHLORIDE 1 MG: 240 CAPSULE, EXTENDED RELEASE ORAL at 20:23

## 2018-08-17 RX ADMIN — ATORVASTATIN CALCIUM 1 MG: 20 TABLET, FILM COATED ORAL at 20:23

## 2018-08-17 RX ADMIN — METOPROLOL TARTRATE 1 MG: 100 TABLET ORAL at 20:23

## 2018-08-17 RX ADMIN — ONDANSETRON HYDROCHLORIDE 1 MG: 2 INJECTION, SOLUTION INTRAMUSCULAR; INTRAVENOUS at 01:18

## 2018-08-17 RX ADMIN — APIXABAN 1 MG: 5 TABLET, FILM COATED ORAL at 08:47

## 2018-08-17 RX ADMIN — DILTIAZEM HYDROCHLORIDE 1 MG: 5 INJECTION INTRAVENOUS at 04:03

## 2018-08-18 ENCOUNTER — HOSPITAL ENCOUNTER (INPATIENT)
Age: 78
LOS: 3 days | Discharge: HOME | DRG: 392 | End: 2018-08-21

## 2018-08-18 LAB
ABNORMAL IP MESSAGE: 1
ADD MAN DIFF?: NO
ANION GAP: 12 (ref 8–16)
BASOPHIL #: 0 10^3/UL (ref 0–0.1)
BASOPHILS %: 0.3 % (ref 0–2)
BLOOD UREA NITROGEN: 13 MG/DL (ref 7–20)
CALCIUM: 8.7 MG/DL (ref 8.4–10.2)
CARBON DIOXIDE: 29 MMOL/L (ref 21–31)
CHLORIDE: 102 MMOL/L (ref 97–110)
CREATININE: 0.63 MG/DL (ref 0.61–1.24)
EOSINOPHILS #: 0 10^3/UL (ref 0–0.5)
EOSINOPHILS %: 0 % (ref 0–7)
GLUCOSE: 84 MG/DL (ref 70–220)
HEMATOCRIT: 44.7 % (ref 42–52)
HEMOGLOBIN: 14.5 G/DL (ref 14–18)
IMMATURE GRANS #M: 0.01 10^3/UL (ref 0–0.03)
IMMATURE GRANS % (M): 0.3 % (ref 0–0.43)
LYMPHOCYTES #: 0.3 10^3/UL (ref 0.8–2.9)
LYMPHOCYTES %: 9.7 % (ref 15–51)
MAGNESIUM: 1.8 MG/DL (ref 1.7–2.5)
MEAN CORPUSCULAR HEMOGLOBIN: 28.9 PG (ref 29–33)
MEAN CORPUSCULAR HGB CONC: 32.4 G/DL (ref 32–37)
MEAN CORPUSCULAR VOLUME: 89.2 FL (ref 82–101)
MEAN PLATELET VOLUME: 10.9 FL (ref 7.4–10.4)
MONOCYTE #: 0.3 10^3/UL (ref 0.3–0.9)
MONOCYTES %: 9.7 % (ref 0–11)
NEUTROPHIL #: 2.8 10^3/UL (ref 1.6–7.5)
NEUTROPHILS %: 80 % (ref 39–77)
NUCLEATED RED BLOOD CELLS #: 0 10^3/UL (ref 0–0)
NUCLEATED RED BLOOD CELLS%: 0 /100WBC (ref 0–0)
PLATELET COUNT: 91 10^3/UL (ref 140–415)
POSITIVE DIFF: (no result)
POTASSIUM: 3.8 MMOL/L (ref 3.5–5.1)
RED BLOOD COUNT: 5.01 10^6/UL (ref 4.7–6.1)
RED CELL DISTRIBUTION WIDTH: 15.4 % (ref 11.5–14.5)
SODIUM: 139 MMOL/L (ref 135–144)
WHITE BLOOD COUNT: 3.5 10^3/UL (ref 4.8–10.8)

## 2018-08-18 RX ADMIN — APIXABAN 1 MG: 5 TABLET, FILM COATED ORAL at 08:32

## 2018-08-18 RX ADMIN — PANTOPRAZOLE SODIUM 1 MG: 40 TABLET, DELAYED RELEASE ORAL at 17:22

## 2018-08-18 RX ADMIN — ATORVASTATIN CALCIUM 1 MG: 20 TABLET, FILM COATED ORAL at 20:18

## 2018-08-18 RX ADMIN — SUCRALFATE 1 GM: 1 TABLET ORAL at 20:18

## 2018-08-18 RX ADMIN — ASPIRIN 81 MG CHEWABLE TABLET 1 MG: 81 TABLET CHEWABLE at 08:33

## 2018-08-18 RX ADMIN — APIXABAN 1 MG: 5 TABLET, FILM COATED ORAL at 20:18

## 2018-08-18 RX ADMIN — CHLORDIAZEPOXIDE HYDROCHLORIDE AND CLIDINIUM BROMIDE 1 CAP: 5; 2.5 CAPSULE ORAL at 22:05

## 2018-08-18 RX ADMIN — CHLORDIAZEPOXIDE HYDROCHLORIDE AND CLIDINIUM BROMIDE 1 CAP: 5; 2.5 CAPSULE ORAL at 14:11

## 2018-08-18 RX ADMIN — DILTIAZEM HYDROCHLORIDE 1 MG: 240 CAPSULE, EXTENDED RELEASE ORAL at 20:18

## 2018-08-18 RX ADMIN — SUCRALFATE 1 GM: 1 TABLET ORAL at 08:33

## 2018-08-18 RX ADMIN — DILTIAZEM HYDROCHLORIDE 1 MG: 240 CAPSULE, EXTENDED RELEASE ORAL at 08:34

## 2018-08-18 RX ADMIN — METOPROLOL TARTRATE 1 MG: 100 TABLET ORAL at 08:33

## 2018-08-18 RX ADMIN — METOPROLOL TARTRATE 1 MG: 100 TABLET ORAL at 20:18

## 2018-08-18 RX ADMIN — PANTOPRAZOLE SODIUM 1 MG: 40 TABLET, DELAYED RELEASE ORAL at 05:44

## 2018-08-19 RX ADMIN — ASPIRIN 81 MG CHEWABLE TABLET 1 MG: 81 TABLET CHEWABLE at 09:15

## 2018-08-19 RX ADMIN — ONDANSETRON HYDROCHLORIDE 1 MG: 2 INJECTION, SOLUTION INTRAMUSCULAR; INTRAVENOUS at 16:43

## 2018-08-19 RX ADMIN — CHLORDIAZEPOXIDE HYDROCHLORIDE AND CLIDINIUM BROMIDE 1 CAP: 5; 2.5 CAPSULE ORAL at 20:39

## 2018-08-19 RX ADMIN — CHLORDIAZEPOXIDE HYDROCHLORIDE AND CLIDINIUM BROMIDE 1 CAP: 5; 2.5 CAPSULE ORAL at 09:15

## 2018-08-19 RX ADMIN — PANTOPRAZOLE SODIUM 1 MG: 40 TABLET, DELAYED RELEASE ORAL at 06:09

## 2018-08-19 RX ADMIN — APIXABAN 1 MG: 5 TABLET, FILM COATED ORAL at 09:17

## 2018-08-19 RX ADMIN — DILTIAZEM HYDROCHLORIDE 1 MG: 240 CAPSULE, EXTENDED RELEASE ORAL at 09:16

## 2018-08-19 RX ADMIN — ATORVASTATIN CALCIUM 1 MG: 20 TABLET, FILM COATED ORAL at 20:39

## 2018-08-19 RX ADMIN — METOPROLOL TARTRATE 1 MG: 100 TABLET ORAL at 21:37

## 2018-08-19 RX ADMIN — SUCRALFATE 1 GM: 1 TABLET ORAL at 09:15

## 2018-08-19 RX ADMIN — ONDANSETRON HYDROCHLORIDE 1 MG: 2 INJECTION, SOLUTION INTRAMUSCULAR; INTRAVENOUS at 20:51

## 2018-08-19 RX ADMIN — SUCRALFATE 1 GM: 1 TABLET ORAL at 20:39

## 2018-08-19 RX ADMIN — PANTOPRAZOLE SODIUM 1 MG: 40 TABLET, DELAYED RELEASE ORAL at 17:33

## 2018-08-19 RX ADMIN — METOPROLOL TARTRATE 1 MG: 100 TABLET ORAL at 09:16

## 2018-08-19 RX ADMIN — APIXABAN 1 MG: 5 TABLET, FILM COATED ORAL at 20:39

## 2018-08-19 RX ADMIN — DILTIAZEM HYDROCHLORIDE 1 MG: 240 CAPSULE, EXTENDED RELEASE ORAL at 21:37

## 2018-08-20 RX ADMIN — ASPIRIN 81 MG CHEWABLE TABLET 1 MG: 81 TABLET CHEWABLE at 09:07

## 2018-08-20 RX ADMIN — DILTIAZEM HYDROCHLORIDE 1 MG: 240 CAPSULE, EXTENDED RELEASE ORAL at 21:12

## 2018-08-20 RX ADMIN — APIXABAN 1 MG: 5 TABLET, FILM COATED ORAL at 21:12

## 2018-08-20 RX ADMIN — CHLORDIAZEPOXIDE HYDROCHLORIDE AND CLIDINIUM BROMIDE 1 CAP: 5; 2.5 CAPSULE ORAL at 21:12

## 2018-08-20 RX ADMIN — PANTOPRAZOLE SODIUM 1 MG: 40 TABLET, DELAYED RELEASE ORAL at 05:45

## 2018-08-20 RX ADMIN — MAGNESIUM HYDROXIDE 1 ML: 400 SUSPENSION ORAL at 13:41

## 2018-08-20 RX ADMIN — PANTOPRAZOLE SODIUM 1 MG: 40 TABLET, DELAYED RELEASE ORAL at 17:50

## 2018-08-20 RX ADMIN — APIXABAN 1 MG: 5 TABLET, FILM COATED ORAL at 09:06

## 2018-08-20 RX ADMIN — CHLORDIAZEPOXIDE HYDROCHLORIDE AND CLIDINIUM BROMIDE 1 CAP: 5; 2.5 CAPSULE ORAL at 09:06

## 2018-08-20 RX ADMIN — DILTIAZEM HYDROCHLORIDE 1 MG: 240 CAPSULE, EXTENDED RELEASE ORAL at 09:08

## 2018-08-20 RX ADMIN — SUCRALFATE 1 GM: 1 TABLET ORAL at 09:06

## 2018-08-20 RX ADMIN — SUCRALFATE 1 GM: 1 TABLET ORAL at 21:12

## 2018-08-20 RX ADMIN — METOPROLOL TARTRATE 1 MG: 100 TABLET ORAL at 21:13

## 2018-08-20 RX ADMIN — ATORVASTATIN CALCIUM 1 MG: 20 TABLET, FILM COATED ORAL at 21:11

## 2018-08-20 RX ADMIN — METOPROLOL TARTRATE 1 MG: 100 TABLET ORAL at 09:09

## 2018-08-21 LAB
ABNORMAL IP MESSAGE: 1
ADD MAN DIFF?: NO
ANION GAP: 10 (ref 8–16)
BASOPHIL #: 0 10^3/UL (ref 0–0.1)
BASOPHILS %: 0.3 % (ref 0–2)
BLOOD UREA NITROGEN: 13 MG/DL (ref 7–20)
CALCIUM: 8.4 MG/DL (ref 8.4–10.2)
CARBON DIOXIDE: 32 MMOL/L (ref 21–31)
CHLORIDE: 102 MMOL/L (ref 97–110)
CREATININE: 0.79 MG/DL (ref 0.61–1.24)
EOSINOPHILS #: 0 10^3/UL (ref 0–0.5)
EOSINOPHILS %: 0 % (ref 0–7)
GLUCOSE: 84 MG/DL (ref 70–220)
HEMATOCRIT: 41.7 % (ref 42–52)
HEMOGLOBIN: 13.3 G/DL (ref 14–18)
IMMATURE GRANS #M: 0.01 10^3/UL (ref 0–0.03)
IMMATURE GRANS % (M): 0.3 % (ref 0–0.43)
LYMPHOCYTES #: 0.4 10^3/UL (ref 0.8–2.9)
LYMPHOCYTES %: 11.1 % (ref 15–51)
MAGNESIUM: 2.1 MG/DL (ref 1.7–2.5)
MEAN CORPUSCULAR HEMOGLOBIN: 28.7 PG (ref 29–33)
MEAN CORPUSCULAR HGB CONC: 31.9 G/DL (ref 32–37)
MEAN CORPUSCULAR VOLUME: 89.9 FL (ref 82–101)
MEAN PLATELET VOLUME: 11.6 FL (ref 7.4–10.4)
MONOCYTE #: 0.6 10^3/UL (ref 0.3–0.9)
MONOCYTES %: 16.9 % (ref 0–11)
NEUTROPHIL #: 2.6 10^3/UL (ref 1.6–7.5)
NEUTROPHILS %: 71.4 % (ref 39–77)
NUCLEATED RED BLOOD CELLS #: 0 10^3/UL (ref 0–0)
NUCLEATED RED BLOOD CELLS%: 0 /100WBC (ref 0–0)
PLATELET COUNT: 105 10^3/UL (ref 140–415)
POSITIVE DIFF: (no result)
POTASSIUM: 3.8 MMOL/L (ref 3.5–5.1)
RED BLOOD COUNT: 4.64 10^6/UL (ref 4.7–6.1)
RED CELL DISTRIBUTION WIDTH: 16.1 % (ref 11.5–14.5)
SODIUM: 140 MMOL/L (ref 135–144)
WHITE BLOOD COUNT: 3.6 10^3/UL (ref 4.8–10.8)

## 2018-08-21 RX ADMIN — PANTOPRAZOLE SODIUM 1 MG: 40 TABLET, DELAYED RELEASE ORAL at 06:31

## 2018-08-21 RX ADMIN — APIXABAN 1 MG: 5 TABLET, FILM COATED ORAL at 09:06

## 2018-08-21 RX ADMIN — SUCRALFATE 1 GM: 1 TABLET ORAL at 09:06

## 2018-08-21 RX ADMIN — CHLORDIAZEPOXIDE HYDROCHLORIDE AND CLIDINIUM BROMIDE 1 CAP: 5; 2.5 CAPSULE ORAL at 09:06

## 2018-08-21 RX ADMIN — METOPROLOL TARTRATE 1 MG: 100 TABLET ORAL at 09:06

## 2018-08-21 RX ADMIN — ASPIRIN 81 MG CHEWABLE TABLET 1 MG: 81 TABLET CHEWABLE at 09:06

## 2018-08-21 RX ADMIN — DILTIAZEM HYDROCHLORIDE 1 MG: 240 CAPSULE, EXTENDED RELEASE ORAL at 09:06

## 2018-10-08 ENCOUNTER — HOSPITAL ENCOUNTER (EMERGENCY)
Age: 78
Discharge: HOME | End: 2018-10-08

## 2018-10-08 ENCOUNTER — HOSPITAL ENCOUNTER (EMERGENCY)
Dept: HOSPITAL 91 - E/R | Age: 78
Discharge: HOME | End: 2018-10-08
Payer: MEDICARE

## 2018-10-08 DIAGNOSIS — I10: ICD-10-CM

## 2018-10-08 DIAGNOSIS — I25.10: ICD-10-CM

## 2018-10-08 DIAGNOSIS — Z98.61: ICD-10-CM

## 2018-10-08 DIAGNOSIS — Z79.82: ICD-10-CM

## 2018-10-08 DIAGNOSIS — R14.0: Primary | ICD-10-CM

## 2018-10-08 LAB
ABNORMAL IP MESSAGE: 1
ADD MAN DIFF?: YES
ADD UMIC: YES
ALANINE AMINOTRANSFERASE: 20 IU/L (ref 13–69)
ALBUMIN/GLOBULIN RATIO: 0.82
ALBUMIN: 3.8 G/DL (ref 3.3–4.9)
ALKALINE PHOSPHATASE: 86 IU/L (ref 42–121)
ANION GAP: 15 (ref 8–16)
ASPARTATE AMINO TRANSFERASE: 51 IU/L (ref 15–46)
BAND NEUTROPHILS #M: 0.1 10^3/UL (ref 0–0.6)
BAND NEUTROPHILS % (M): 3 % (ref 0–4)
BILIRUBIN,DIRECT: 0 MG/DL (ref 0–0.2)
BILIRUBIN,TOTAL: 1.8 MG/DL (ref 0.2–1.3)
BLOOD UREA NITROGEN: 11 MG/DL (ref 7–20)
CALCIUM: 9.4 MG/DL (ref 8.4–10.2)
CARBON DIOXIDE: 30 MMOL/L (ref 21–31)
CHLORIDE: 102 MMOL/L (ref 97–110)
CREATININE: 0.75 MG/DL (ref 0.61–1.24)
GLOBULIN: 4.6 G/DL (ref 1.3–3.2)
GLUCOSE: 93 MG/DL (ref 70–220)
HEMATOCRIT: 46.5 % (ref 42–52)
HEMOGLOBIN: 14.7 G/DL (ref 14–18)
IMMATURE GRANS #M: 0.02 10^3/UL (ref 0–0.03)
IMMATURE GRANS % (M): 0.5 % (ref 0–0.43)
LIPASE: 96 U/L (ref 23–300)
LYMPHOCYTES #M: 0.4 10^3/UL (ref 0.8–2.9)
LYMPHOCYTES % (M): 12 % (ref 15–51)
MEAN CORPUSCULAR HEMOGLOBIN: 28 PG (ref 29–33)
MEAN CORPUSCULAR HGB CONC: 31.6 G/DL (ref 32–37)
MEAN CORPUSCULAR VOLUME: 88.6 FL (ref 82–101)
MEAN PLATELET VOLUME: 10.7 FL (ref 7.4–10.4)
METAMYELOCYTES #M: 0 10^3/UL (ref 0–0)
METAMYELOCYTES %M: 1 % (ref 0–0)
MONOCYTE #M: 0.3 10^3/UL (ref 0.3–0.9)
MONOCYTES % (M): 8 % (ref 0–11)
NUCLEATED RED BLOOD CELLS%: 0 /100WBC (ref 0–0)
PLATELET COUNT: 190 10^3/UL (ref 140–415)
PLATELET ESTIMATE: NORMAL
POIKILOCYTOSIS: (no result) (ref 0–0)
POLYCHROMASIA: (no result) (ref 0–0)
POSITIVE DIFF: (no result)
POTASSIUM: 3.7 MMOL/L (ref 3.5–5.1)
RED BLOOD COUNT: 5.25 10^6/UL (ref 4.7–6.1)
RED CELL DISTRIBUTION WIDTH: 15.6 % (ref 11.5–14.5)
SEG NEUT #M: 3 10^3/UL (ref 1.6–7.5)
SEGMENTED NEUTROPHILS (M) %: 76 % (ref 39–77)
SMUDGE%M: 20 % (ref 0–0)
SODIUM: 143 MMOL/L (ref 135–144)
TOTAL PROTEIN: 8.4 G/DL (ref 6.1–8.1)
TROPONIN-I: < 0.012 NG/ML (ref 0–0.12)
UR ASCORBIC ACID: NEGATIVE MG/DL
UR BILIRUBIN (DIP): (no result) MG/DL
UR BLOOD (DIP): NEGATIVE MG/DL
UR CLARITY: CLEAR
UR COLOR: (no result)
UR GLUCOSE (DIP): NEGATIVE MG/DL
UR KETONES (DIP): NEGATIVE MG/DL
UR LEUKOCYTE ESTERASE (DIP): NEGATIVE LEU/UL
UR MUCUS: (no result) /HPF
UR NITRITE (DIP): NEGATIVE MG/DL
UR PH (DIP): 7 (ref 5–9)
UR RBC: 1 /HPF (ref 0–5)
UR SPECIFIC GRAVITY (DIP): 1.02 (ref 1–1.03)
UR TOTAL PROTEIN (DIP): (no result) MG/DL
UR UROBILINOGEN (DIP): (no result) MG/DL
UR WBC: 1 /HPF (ref 0–5)
WHITE BLOOD COUNT: 4 10^3/UL (ref 4.8–10.8)

## 2018-10-08 PROCEDURE — 71045 X-RAY EXAM CHEST 1 VIEW: CPT

## 2018-10-08 PROCEDURE — 99284 EMERGENCY DEPT VISIT MOD MDM: CPT

## 2018-10-08 PROCEDURE — 81001 URINALYSIS AUTO W/SCOPE: CPT

## 2018-10-08 PROCEDURE — 84484 ASSAY OF TROPONIN QUANT: CPT

## 2018-10-08 PROCEDURE — 83690 ASSAY OF LIPASE: CPT

## 2018-10-08 PROCEDURE — 36415 COLL VENOUS BLD VENIPUNCTURE: CPT

## 2018-10-08 PROCEDURE — 85025 COMPLETE CBC W/AUTO DIFF WBC: CPT

## 2018-10-08 PROCEDURE — 80053 COMPREHEN METABOLIC PANEL: CPT

## 2018-10-11 ENCOUNTER — HOSPITAL ENCOUNTER (OUTPATIENT)
Dept: HOSPITAL 91 - PUL | Age: 78
Discharge: HOME | End: 2018-10-11
Payer: MEDICARE

## 2018-10-11 ENCOUNTER — HOSPITAL ENCOUNTER (OUTPATIENT)
Age: 78
Discharge: HOME | End: 2018-10-11

## 2018-10-11 DIAGNOSIS — I42.1: ICD-10-CM

## 2018-10-11 DIAGNOSIS — R06.00: Primary | ICD-10-CM

## 2018-10-11 DIAGNOSIS — I48.1: ICD-10-CM

## 2018-10-11 DIAGNOSIS — R01.1: ICD-10-CM

## 2018-10-11 DIAGNOSIS — I35.0: ICD-10-CM

## 2018-10-11 DIAGNOSIS — E78.5: ICD-10-CM

## 2018-10-11 DIAGNOSIS — I25.10: ICD-10-CM

## 2018-10-11 DIAGNOSIS — I10: ICD-10-CM

## 2018-10-11 LAB
ABNORMAL IP MESSAGE: 1
ADD MAN DIFF?: NO
ANION GAP: 14 (ref 8–16)
BASOPHIL #: 0 10^3/UL (ref 0–0.1)
BASOPHILS %: 0.5 % (ref 0–2)
BLOOD UREA NITROGEN: 9 MG/DL (ref 7–20)
CALCIUM: 9.1 MG/DL (ref 8.4–10.2)
CARBON DIOXIDE: 24 MMOL/L (ref 21–31)
CHLORIDE: 107 MMOL/L (ref 97–110)
CREATININE: 0.66 MG/DL (ref 0.61–1.24)
EOSINOPHILS #: 0 10^3/UL (ref 0–0.5)
EOSINOPHILS %: 0 % (ref 0–7)
GLUCOSE: 107 MG/DL (ref 70–220)
HEMATOCRIT: 47.8 % (ref 42–52)
HEMOGLOBIN: 14.8 G/DL (ref 14–18)
IMMATURE GRANS #M: 0.01 10^3/UL (ref 0–0.03)
IMMATURE GRANS % (M): 0.2 % (ref 0–0.43)
INR: 1.5
LYMPHOCYTES #: 0.4 10^3/UL (ref 0.8–2.9)
LYMPHOCYTES %: 9.8 % (ref 15–51)
MEAN CORPUSCULAR HEMOGLOBIN: 28.1 PG (ref 29–33)
MEAN CORPUSCULAR HGB CONC: 31 G/DL (ref 32–37)
MEAN CORPUSCULAR VOLUME: 90.7 FL (ref 82–101)
MEAN PLATELET VOLUME: 10.8 FL (ref 7.4–10.4)
MONOCYTE #: 0.4 10^3/UL (ref 0.3–0.9)
MONOCYTES %: 10.5 % (ref 0–11)
NEUTROPHIL #: 3.2 10^3/UL (ref 1.6–7.5)
NEUTROPHILS %: 79 % (ref 39–77)
NUCLEATED RED BLOOD CELLS #: 0 10^3/UL (ref 0–0)
NUCLEATED RED BLOOD CELLS%: 0 /100WBC (ref 0–0)
PLATELET COUNT: 143 10^3/UL (ref 140–415)
POSITIVE DIFF: (no result)
POTASSIUM: 4 MMOL/L (ref 3.5–5.1)
PROTIME: 18.4 SEC (ref 11.9–14.9)
PT RATIO: 1.4
RED BLOOD COUNT: 5.27 10^6/UL (ref 4.7–6.1)
RED CELL DISTRIBUTION WIDTH: 15.7 % (ref 11.5–14.5)
SODIUM: 141 MMOL/L (ref 135–144)
WHITE BLOOD COUNT: 4.1 10^3/UL (ref 4.8–10.8)

## 2018-10-11 PROCEDURE — 85025 COMPLETE CBC W/AUTO DIFF WBC: CPT

## 2018-10-11 PROCEDURE — 94060 EVALUATION OF WHEEZING: CPT

## 2018-10-11 PROCEDURE — 80048 BASIC METABOLIC PNL TOTAL CA: CPT

## 2018-10-11 PROCEDURE — 94729 DIFFUSING CAPACITY: CPT

## 2018-10-11 PROCEDURE — 94664 DEMO&/EVAL PT USE INHALER: CPT

## 2018-10-11 PROCEDURE — 85610 PROTHROMBIN TIME: CPT

## 2018-10-11 PROCEDURE — 94726 PLETHYSMOGRAPHY LUNG VOLUMES: CPT

## 2018-10-21 ENCOUNTER — HOSPITAL ENCOUNTER (INPATIENT)
Age: 78
LOS: 3 days | Discharge: HOME | DRG: 392 | End: 2018-10-24

## 2018-10-21 ENCOUNTER — HOSPITAL ENCOUNTER (INPATIENT)
Dept: HOSPITAL 91 - E/R | Age: 78
LOS: 3 days | Discharge: HOME | DRG: 392 | End: 2018-10-24
Payer: MEDICARE

## 2018-10-21 DIAGNOSIS — Z79.01: ICD-10-CM

## 2018-10-21 DIAGNOSIS — K80.20: ICD-10-CM

## 2018-10-21 DIAGNOSIS — I25.2: ICD-10-CM

## 2018-10-21 DIAGNOSIS — Z95.5: ICD-10-CM

## 2018-10-21 DIAGNOSIS — I35.0: ICD-10-CM

## 2018-10-21 DIAGNOSIS — D61.818: ICD-10-CM

## 2018-10-21 DIAGNOSIS — I48.2: ICD-10-CM

## 2018-10-21 DIAGNOSIS — I10: ICD-10-CM

## 2018-10-21 DIAGNOSIS — Z77.090: ICD-10-CM

## 2018-10-21 DIAGNOSIS — I48.0: ICD-10-CM

## 2018-10-21 DIAGNOSIS — I25.10: ICD-10-CM

## 2018-10-21 DIAGNOSIS — K21.9: Primary | ICD-10-CM

## 2018-10-21 DIAGNOSIS — Z86.73: ICD-10-CM

## 2018-10-21 DIAGNOSIS — I42.1: ICD-10-CM

## 2018-10-21 DIAGNOSIS — E78.5: ICD-10-CM

## 2018-10-21 DIAGNOSIS — Z79.82: ICD-10-CM

## 2018-10-21 LAB
ABNORMAL IP MESSAGE: 1
ADD MAN DIFF?: NO
ALANINE AMINOTRANSFERASE: 30 IU/L (ref 13–69)
ALBUMIN/GLOBULIN RATIO: 0.8
ALBUMIN: 3.2 G/DL (ref 3.3–4.9)
ALKALINE PHOSPHATASE: 81 IU/L (ref 42–121)
ANION GAP: 9 (ref 5–13)
ASPARTATE AMINO TRANSFERASE: 48 IU/L (ref 15–46)
B-TYPE NATRIURETIC PEPTIDE: 2910 PG/ML (ref 0–450)
BASOPHIL #: 0 10^3/UL (ref 0–0.1)
BASOPHILS %: 0.6 % (ref 0–2)
BILIRUBIN,DIRECT: 0 MG/DL (ref 0–0.2)
BILIRUBIN,TOTAL: 1.5 MG/DL (ref 0.2–1.3)
BLOOD UREA NITROGEN: 7 MG/DL (ref 7–20)
CALCIUM: 8.9 MG/DL (ref 8.4–10.2)
CARBON DIOXIDE: 25 MMOL/L (ref 21–31)
CHLORIDE: 106 MMOL/L (ref 97–110)
CREATININE: 0.7 MG/DL (ref 0.61–1.24)
EOSINOPHILS #: 0 10^3/UL (ref 0–0.5)
EOSINOPHILS %: 0 % (ref 0–7)
GLOBULIN: 4 G/DL (ref 1.3–3.2)
GLUCOSE: 98 MG/DL (ref 70–220)
HEMATOCRIT: 44.7 % (ref 42–52)
HEMOGLOBIN: 14.5 G/DL (ref 14–18)
IMMATURE GRANS #M: 0.02 10^3/UL (ref 0–0.03)
IMMATURE GRANS % (M): 0.6 % (ref 0–0.43)
INR: 1.33
LYMPHOCYTES #: 0.4 10^3/UL (ref 0.8–2.9)
LYMPHOCYTES %: 10.6 % (ref 15–51)
MEAN CORPUSCULAR HEMOGLOBIN: 28.4 PG (ref 29–33)
MEAN CORPUSCULAR HGB CONC: 32.4 G/DL (ref 32–37)
MEAN CORPUSCULAR VOLUME: 87.6 FL (ref 82–101)
MEAN PLATELET VOLUME: 11.4 FL (ref 7.4–10.4)
MONOCYTE #: 0.4 10^3/UL (ref 0.3–0.9)
MONOCYTES %: 11.5 % (ref 0–11)
NEUTROPHIL #: 2.5 10^3/UL (ref 1.6–7.5)
NEUTROPHILS %: 76.7 % (ref 39–77)
NUCLEATED RED BLOOD CELLS #: 0 10^3/UL (ref 0–0)
NUCLEATED RED BLOOD CELLS%: 0 /100WBC (ref 0–0)
PARTIAL THROMBOPLASTIN TIME: 34.2 SEC (ref 23–35)
PLATELET COUNT: 167 10^3/UL (ref 140–415)
POSITIVE DIFF: (no result)
POTASSIUM: 3.6 MMOL/L (ref 3.5–5.1)
PROTIME: 16.7 SEC (ref 11.9–14.9)
PT RATIO: 1.3
RED BLOOD COUNT: 5.1 10^6/UL (ref 4.7–6.1)
RED CELL DISTRIBUTION WIDTH: 16.6 % (ref 11.5–14.5)
SODIUM: 140 MMOL/L (ref 135–144)
TOTAL PROTEIN: 7.2 G/DL (ref 6.1–8.1)
TROPONIN-I: < 0.012 NG/ML (ref 0–0.12)
WHITE BLOOD COUNT: 3.3 10^3/UL (ref 4.8–10.8)

## 2018-10-21 PROCEDURE — 84484 ASSAY OF TROPONIN QUANT: CPT

## 2018-10-21 PROCEDURE — 82150 ASSAY OF AMYLASE: CPT

## 2018-10-21 PROCEDURE — 80048 BASIC METABOLIC PNL TOTAL CA: CPT

## 2018-10-21 PROCEDURE — 85730 THROMBOPLASTIN TIME PARTIAL: CPT

## 2018-10-21 PROCEDURE — 96375 TX/PRO/DX INJ NEW DRUG ADDON: CPT

## 2018-10-21 PROCEDURE — 93005 ELECTROCARDIOGRAM TRACING: CPT

## 2018-10-21 PROCEDURE — 71045 X-RAY EXAM CHEST 1 VIEW: CPT

## 2018-10-21 PROCEDURE — 83690 ASSAY OF LIPASE: CPT

## 2018-10-21 PROCEDURE — 36415 COLL VENOUS BLD VENIPUNCTURE: CPT

## 2018-10-21 PROCEDURE — 83880 ASSAY OF NATRIURETIC PEPTIDE: CPT

## 2018-10-21 PROCEDURE — 84443 ASSAY THYROID STIM HORMONE: CPT

## 2018-10-21 PROCEDURE — 99285 EMERGENCY DEPT VISIT HI MDM: CPT

## 2018-10-21 PROCEDURE — 96374 THER/PROPH/DIAG INJ IV PUSH: CPT

## 2018-10-21 PROCEDURE — 85025 COMPLETE CBC W/AUTO DIFF WBC: CPT

## 2018-10-21 PROCEDURE — 83735 ASSAY OF MAGNESIUM: CPT

## 2018-10-21 PROCEDURE — 80053 COMPREHEN METABOLIC PANEL: CPT

## 2018-10-21 PROCEDURE — 84436 ASSAY OF TOTAL THYROXINE: CPT

## 2018-10-21 PROCEDURE — 85610 PROTHROMBIN TIME: CPT

## 2018-10-21 PROCEDURE — 93306 TTE W/DOPPLER COMPLETE: CPT

## 2018-10-21 RX ADMIN — BUSPIRONE HYDROCHLORIDE 1 MG: 10 TABLET ORAL at 21:15

## 2018-10-21 RX ADMIN — ONDANSETRON HYDROCHLORIDE 1 MG: 2 INJECTION, SOLUTION INTRAMUSCULAR; INTRAVENOUS at 15:51

## 2018-10-21 RX ADMIN — METOPROLOL TARTRATE 1 MG: 100 TABLET ORAL at 21:16

## 2018-10-21 RX ADMIN — SUCRALFATE 1 GM: 1 TABLET ORAL at 21:15

## 2018-10-21 RX ADMIN — APIXABAN 1 MG: 5 TABLET, FILM COATED ORAL at 21:15

## 2018-10-21 RX ADMIN — ATORVASTATIN CALCIUM 1 MG: 20 TABLET, FILM COATED ORAL at 21:15

## 2018-10-22 LAB
ABNORMAL IP MESSAGE: 1
ADD MAN DIFF?: NO
ALANINE AMINOTRANSFERASE: 28 IU/L (ref 13–69)
ALBUMIN/GLOBULIN RATIO: 0.81
ALBUMIN: 3.1 G/DL (ref 3.3–4.9)
ALKALINE PHOSPHATASE: 76 IU/L (ref 42–121)
ANION GAP: 8 (ref 5–13)
ASPARTATE AMINO TRANSFERASE: 44 IU/L (ref 15–46)
BASOPHIL #: 0 10^3/UL (ref 0–0.1)
BASOPHILS %: 0.6 % (ref 0–2)
BILIRUBIN,DIRECT: 0 MG/DL (ref 0–0.2)
BILIRUBIN,TOTAL: 2.1 MG/DL (ref 0.2–1.3)
BLOOD UREA NITROGEN: 11 MG/DL (ref 7–20)
CALCIUM: 8.8 MG/DL (ref 8.4–10.2)
CARBON DIOXIDE: 31 MMOL/L (ref 21–31)
CHLORIDE: 102 MMOL/L (ref 97–110)
CREATININE: 0.84 MG/DL (ref 0.61–1.24)
EOSINOPHILS #: 0 10^3/UL (ref 0–0.5)
EOSINOPHILS %: 0 % (ref 0–7)
GLOBULIN: 3.8 G/DL (ref 1.3–3.2)
GLUCOSE: 102 MG/DL (ref 70–220)
HEMATOCRIT: 42.9 % (ref 42–52)
HEMOGLOBIN: 13.6 G/DL (ref 14–18)
IMMATURE GRANS #M: 0.01 10^3/UL (ref 0–0.03)
IMMATURE GRANS % (M): 0.3 % (ref 0–0.43)
LYMPHOCYTES #: 0.4 10^3/UL (ref 0.8–2.9)
LYMPHOCYTES %: 10.2 % (ref 15–51)
MEAN CORPUSCULAR HEMOGLOBIN: 28.3 PG (ref 29–33)
MEAN CORPUSCULAR HGB CONC: 31.7 G/DL (ref 32–37)
MEAN CORPUSCULAR VOLUME: 89.4 FL (ref 82–101)
MEAN PLATELET VOLUME: 10.6 FL (ref 7.4–10.4)
MONOCYTE #: 0.6 10^3/UL (ref 0.3–0.9)
MONOCYTES %: 15.5 % (ref 0–11)
NEUTROPHIL #: 2.7 10^3/UL (ref 1.6–7.5)
NEUTROPHILS %: 73.4 % (ref 39–77)
NUCLEATED RED BLOOD CELLS #: 0 10^3/UL (ref 0–0)
NUCLEATED RED BLOOD CELLS%: 0 /100WBC (ref 0–0)
PLATELET COUNT: 133 10^3/UL (ref 140–415)
POSITIVE DIFF: (no result)
POTASSIUM: 4 MMOL/L (ref 3.5–5.1)
RED BLOOD COUNT: 4.8 10^6/UL (ref 4.7–6.1)
RED CELL DISTRIBUTION WIDTH: 16.3 % (ref 11.5–14.5)
SODIUM: 141 MMOL/L (ref 135–144)
T4 (THYROXINE): 8.9 UG/DL (ref 5.5–11)
THYROID STIMULATING HORMONE: 3.71 MIU/L (ref 0.47–4.68)
TOTAL PROTEIN: 6.9 G/DL (ref 6.1–8.1)
TROPONIN-I: < 0.012 NG/ML (ref 0–0.12)
WHITE BLOOD COUNT: 3.6 10^3/UL (ref 4.8–10.8)

## 2018-10-22 RX ADMIN — SUCRALFATE 1 GM: 1 TABLET ORAL at 08:58

## 2018-10-22 RX ADMIN — ALUMINUM HYDROXIDE, MAGNESIUM HYDROXIDE, AND SIMETHICONE 1 ML: 200; 200; 20 SUSPENSION ORAL at 20:53

## 2018-10-22 RX ADMIN — METOPROLOL TARTRATE 1 MG: 100 TABLET ORAL at 08:58

## 2018-10-22 RX ADMIN — ATORVASTATIN CALCIUM 1 MG: 20 TABLET, FILM COATED ORAL at 20:52

## 2018-10-22 RX ADMIN — METOPROLOL TARTRATE 1 MG: 100 TABLET ORAL at 20:53

## 2018-10-22 RX ADMIN — PANTOPRAZOLE SODIUM 1 MG: 40 TABLET, DELAYED RELEASE ORAL at 05:33

## 2018-10-22 RX ADMIN — BUSPIRONE HYDROCHLORIDE 1 MG: 10 TABLET ORAL at 20:53

## 2018-10-22 RX ADMIN — SUCRALFATE 1 GM: 1 TABLET ORAL at 20:52

## 2018-10-22 RX ADMIN — ASPIRIN 81 MG CHEWABLE TABLET 1 MG: 81 TABLET CHEWABLE at 08:58

## 2018-10-22 RX ADMIN — APIXABAN 1 MG: 5 TABLET, FILM COATED ORAL at 08:58

## 2018-10-22 RX ADMIN — APIXABAN 1 MG: 5 TABLET, FILM COATED ORAL at 20:52

## 2018-10-22 RX ADMIN — BUSPIRONE HYDROCHLORIDE 1 MG: 10 TABLET ORAL at 08:59

## 2018-10-23 LAB
ABNORMAL IP MESSAGE: 1
ADD MAN DIFF?: NO
ANION GAP: 6 (ref 5–13)
BASOPHIL #: 0 10^3/UL (ref 0–0.1)
BASOPHILS %: 0.3 % (ref 0–2)
BLOOD UREA NITROGEN: 13 MG/DL (ref 7–20)
CALCIUM: 8.7 MG/DL (ref 8.4–10.2)
CARBON DIOXIDE: 31 MMOL/L (ref 21–31)
CHLORIDE: 103 MMOL/L (ref 97–110)
CREATININE: 0.77 MG/DL (ref 0.61–1.24)
EOSINOPHILS #: 0 10^3/UL (ref 0–0.5)
EOSINOPHILS %: 0.3 % (ref 0–7)
GLUCOSE: 99 MG/DL (ref 70–220)
HEMATOCRIT: 42.9 % (ref 42–52)
HEMOGLOBIN: 13.6 G/DL (ref 14–18)
IMMATURE GRANS #M: 0.01 10^3/UL (ref 0–0.03)
IMMATURE GRANS % (M): 0.3 % (ref 0–0.43)
LYMPHOCYTES #: 0.4 10^3/UL (ref 0.8–2.9)
LYMPHOCYTES %: 11.1 % (ref 15–51)
MAGNESIUM: 1.9 MG/DL (ref 1.7–2.5)
MEAN CORPUSCULAR HEMOGLOBIN: 28.5 PG (ref 29–33)
MEAN CORPUSCULAR HGB CONC: 31.7 G/DL (ref 32–37)
MEAN CORPUSCULAR VOLUME: 89.9 FL (ref 82–101)
MEAN PLATELET VOLUME: 10.5 FL (ref 7.4–10.4)
MONOCYTE #: 0.5 10^3/UL (ref 0.3–0.9)
MONOCYTES %: 15.4 % (ref 0–11)
NEUTROPHIL #: 2.4 10^3/UL (ref 1.6–7.5)
NEUTROPHILS %: 72.6 % (ref 39–77)
NUCLEATED RED BLOOD CELLS #: 0 10^3/UL (ref 0–0)
NUCLEATED RED BLOOD CELLS%: 0 /100WBC (ref 0–0)
PLATELET COUNT: 126 10^3/UL (ref 140–415)
POTASSIUM: 4.1 MMOL/L (ref 3.5–5.1)
RED BLOOD COUNT: 4.77 10^6/UL (ref 4.7–6.1)
RED CELL DISTRIBUTION WIDTH: 16.2 % (ref 11.5–14.5)
SODIUM: 140 MMOL/L (ref 135–144)
WHITE BLOOD COUNT: 3.2 10^3/UL (ref 4.8–10.8)

## 2018-10-23 RX ADMIN — ASPIRIN 81 MG CHEWABLE TABLET 1 MG: 81 TABLET CHEWABLE at 08:23

## 2018-10-23 RX ADMIN — ATORVASTATIN CALCIUM 1 MG: 20 TABLET, FILM COATED ORAL at 21:11

## 2018-10-23 RX ADMIN — SUCRALFATE 1 GM: 1 TABLET ORAL at 08:23

## 2018-10-23 RX ADMIN — DICYCLOMINE HYDROCHLORIDE 1 MG: 10 CAPSULE ORAL at 18:47

## 2018-10-23 RX ADMIN — PANTOPRAZOLE SODIUM 1 MG: 40 TABLET, DELAYED RELEASE ORAL at 05:58

## 2018-10-23 RX ADMIN — ALUMINUM HYDROXIDE, MAGNESIUM HYDROXIDE, AND SIMETHICONE 1 ML: 200; 200; 20 SUSPENSION ORAL at 08:21

## 2018-10-23 RX ADMIN — METOPROLOL TARTRATE 1 MG: 100 TABLET ORAL at 08:23

## 2018-10-23 RX ADMIN — DILTIAZEM HYDROCHLORIDE 1 MG: 180 CAPSULE, COATED, EXTENDED RELEASE ORAL at 08:24

## 2018-10-23 RX ADMIN — METOPROLOL TARTRATE 1 MG: 100 TABLET ORAL at 21:11

## 2018-10-23 RX ADMIN — BUSPIRONE HYDROCHLORIDE 1 MG: 10 TABLET ORAL at 21:11

## 2018-10-23 RX ADMIN — SUCRALFATE 1 GM: 1 TABLET ORAL at 21:11

## 2018-10-23 RX ADMIN — APIXABAN 1 MG: 5 TABLET, FILM COATED ORAL at 21:11

## 2018-10-23 RX ADMIN — BUSPIRONE HYDROCHLORIDE 1 MG: 10 TABLET ORAL at 08:24

## 2018-10-23 RX ADMIN — APIXABAN 1 MG: 5 TABLET, FILM COATED ORAL at 08:23

## 2018-10-24 LAB
ABNORMAL IP MESSAGE: 1
ADD MAN DIFF?: NO
ALANINE AMINOTRANSFERASE: 24 IU/L (ref 13–69)
ALBUMIN/GLOBULIN RATIO: 0.91
ALBUMIN: 3.3 G/DL (ref 3.3–4.9)
ALKALINE PHOSPHATASE: 63 IU/L (ref 42–121)
AMYLASE: 54 U/L (ref 11–123)
ANION GAP: 8 (ref 5–13)
ASPARTATE AMINO TRANSFERASE: 51 IU/L (ref 15–46)
BASOPHIL #: 0 10^3/UL (ref 0–0.1)
BASOPHILS %: 0.3 % (ref 0–2)
BILIRUBIN,DIRECT: 0 MG/DL (ref 0–0.2)
BILIRUBIN,TOTAL: 1.7 MG/DL (ref 0.2–1.3)
BLOOD UREA NITROGEN: 10 MG/DL (ref 7–20)
CALCIUM: 8.2 MG/DL (ref 8.4–10.2)
CARBON DIOXIDE: 29 MMOL/L (ref 21–31)
CHLORIDE: 103 MMOL/L (ref 97–110)
CREATININE: 0.72 MG/DL (ref 0.61–1.24)
EOSINOPHILS #: 0 10^3/UL (ref 0–0.5)
EOSINOPHILS %: 0.3 % (ref 0–7)
GLOBULIN: 3.6 G/DL (ref 1.3–3.2)
GLUCOSE: 104 MG/DL (ref 70–220)
HEMATOCRIT: 40.9 % (ref 42–52)
HEMOGLOBIN: 12.9 G/DL (ref 14–18)
IMMATURE GRANS #M: 0.01 10^3/UL (ref 0–0.03)
IMMATURE GRANS % (M): 0.3 % (ref 0–0.43)
LIPASE: 58 U/L (ref 23–300)
LYMPHOCYTES #: 0.4 10^3/UL (ref 0.8–2.9)
LYMPHOCYTES %: 12 % (ref 15–51)
MAGNESIUM: 2.1 MG/DL (ref 1.7–2.5)
MEAN CORPUSCULAR HEMOGLOBIN: 28.4 PG (ref 29–33)
MEAN CORPUSCULAR HGB CONC: 31.5 G/DL (ref 32–37)
MEAN CORPUSCULAR VOLUME: 89.9 FL (ref 82–101)
MEAN PLATELET VOLUME: 10.8 FL (ref 7.4–10.4)
MONOCYTE #: 0.5 10^3/UL (ref 0.3–0.9)
MONOCYTES %: 18 % (ref 0–11)
NEUTROPHIL #: 2.1 10^3/UL (ref 1.6–7.5)
NEUTROPHILS %: 69.1 % (ref 39–77)
NUCLEATED RED BLOOD CELLS #: 0 10^3/UL (ref 0–0)
NUCLEATED RED BLOOD CELLS%: 0 /100WBC (ref 0–0)
PLATELET COUNT: 122 10^3/UL (ref 140–415)
POSITIVE DIFF: (no result)
POTASSIUM: 3.9 MMOL/L (ref 3.5–5.1)
RED BLOOD COUNT: 4.55 10^6/UL (ref 4.7–6.1)
RED CELL DISTRIBUTION WIDTH: 16.6 % (ref 11.5–14.5)
SODIUM: 140 MMOL/L (ref 135–144)
TOTAL PROTEIN: 6.9 G/DL (ref 6.1–8.1)
WHITE BLOOD COUNT: 3 10^3/UL (ref 4.8–10.8)

## 2018-10-24 RX ADMIN — DICYCLOMINE HYDROCHLORIDE 1 MG: 10 CAPSULE ORAL at 08:15

## 2018-10-24 RX ADMIN — SUCRALFATE 1 GM: 1 TABLET ORAL at 08:17

## 2018-10-24 RX ADMIN — BUSPIRONE HYDROCHLORIDE 1 MG: 10 TABLET ORAL at 08:17

## 2018-10-24 RX ADMIN — DILTIAZEM HYDROCHLORIDE 1 MG: 180 CAPSULE, COATED, EXTENDED RELEASE ORAL at 08:16

## 2018-10-24 RX ADMIN — APIXABAN 1 MG: 5 TABLET, FILM COATED ORAL at 08:17

## 2018-10-24 RX ADMIN — ASPIRIN 81 MG CHEWABLE TABLET 1 MG: 81 TABLET CHEWABLE at 08:17

## 2018-10-24 RX ADMIN — PANTOPRAZOLE SODIUM 1 MG: 40 TABLET, DELAYED RELEASE ORAL at 06:42

## 2018-10-24 RX ADMIN — METOPROLOL TARTRATE 1 MG: 100 TABLET ORAL at 08:16

## 2018-10-24 RX ADMIN — ALUMINUM HYDROXIDE, MAGNESIUM HYDROXIDE, AND SIMETHICONE 1 ML: 200; 200; 20 SUSPENSION ORAL at 12:16

## 2018-11-11 ENCOUNTER — HOSPITAL ENCOUNTER (INPATIENT)
Age: 78
LOS: 2 days | Discharge: HOME | DRG: 310 | End: 2018-11-13

## 2018-11-11 ENCOUNTER — HOSPITAL ENCOUNTER (INPATIENT)
Dept: HOSPITAL 91 - E/R | Age: 78
LOS: 2 days | Discharge: HOME | DRG: 310 | End: 2018-11-13
Payer: MEDICARE

## 2018-11-11 DIAGNOSIS — I25.10: ICD-10-CM

## 2018-11-11 DIAGNOSIS — E78.5: ICD-10-CM

## 2018-11-11 DIAGNOSIS — Z79.82: ICD-10-CM

## 2018-11-11 DIAGNOSIS — I10: ICD-10-CM

## 2018-11-11 DIAGNOSIS — Z79.02: ICD-10-CM

## 2018-11-11 DIAGNOSIS — F41.9: ICD-10-CM

## 2018-11-11 DIAGNOSIS — K80.20: ICD-10-CM

## 2018-11-11 DIAGNOSIS — K21.9: ICD-10-CM

## 2018-11-11 DIAGNOSIS — Z86.73: ICD-10-CM

## 2018-11-11 DIAGNOSIS — I42.1: ICD-10-CM

## 2018-11-11 DIAGNOSIS — I48.0: Primary | ICD-10-CM

## 2018-11-11 DIAGNOSIS — Z95.5: ICD-10-CM

## 2018-11-11 LAB
ABNORMAL IP MESSAGE: 1
ADD MAN DIFF?: NO
ANION GAP: 12 (ref 5–13)
BASOPHIL #: 0 10^3/UL (ref 0–0.1)
BASOPHILS %: 0.7 % (ref 0–2)
BLOOD UREA NITROGEN: 10 MG/DL (ref 7–20)
CALCIUM: 9.2 MG/DL (ref 8.4–10.2)
CARBON DIOXIDE: 25 MMOL/L (ref 21–31)
CHLORIDE: 105 MMOL/L (ref 97–110)
CK INDEX: 3.1
CK-MB: 1.22 NG/ML (ref 0–2.4)
CREATINE KINASE: 39 IU/L (ref 23–200)
CREATININE: 0.71 MG/DL (ref 0.61–1.24)
EOSINOPHILS #: 0 10^3/UL (ref 0–0.5)
EOSINOPHILS %: 0 % (ref 0–7)
GLUCOSE: 115 MG/DL (ref 70–220)
HEMATOCRIT: 47 % (ref 42–52)
HEMOGLOBIN: 15.1 G/DL (ref 14–18)
IMMATURE GRANS #M: 0.02 10^3/UL (ref 0–0.03)
IMMATURE GRANS % (M): 0.5 % (ref 0–0.43)
LYMPHOCYTES #: 0.5 10^3/UL (ref 0.8–2.9)
LYMPHOCYTES %: 11.8 % (ref 15–51)
MEAN CORPUSCULAR HEMOGLOBIN: 28.1 PG (ref 29–33)
MEAN CORPUSCULAR HGB CONC: 32.1 G/DL (ref 32–37)
MEAN CORPUSCULAR VOLUME: 87.4 FL (ref 82–101)
MEAN PLATELET VOLUME: 10.1 FL (ref 7.4–10.4)
MONOCYTE #: 0.5 10^3/UL (ref 0.3–0.9)
MONOCYTES %: 11.3 % (ref 0–11)
NEUTROPHIL #: 3.1 10^3/UL (ref 1.6–7.5)
NEUTROPHILS %: 75.7 % (ref 39–77)
NUCLEATED RED BLOOD CELLS #: 0 10^3/UL (ref 0–0)
NUCLEATED RED BLOOD CELLS%: 0 /100WBC (ref 0–0)
PLATELET COUNT: 209 10^3/UL (ref 140–415)
POSITIVE DIFF: (no result)
POTASSIUM: 4.1 MMOL/L (ref 3.5–5.1)
RED BLOOD COUNT: 5.38 10^6/UL (ref 4.7–6.1)
RED CELL DISTRIBUTION WIDTH: 16.1 % (ref 11.5–14.5)
SODIUM: 142 MMOL/L (ref 135–144)
TROPONIN-I: < 0.012 NG/ML (ref 0–0.12)
TROPONIN-I: < 0.012 NG/ML (ref 0–0.12)
WHITE BLOOD COUNT: 4.1 10^3/UL (ref 4.8–10.8)

## 2018-11-11 PROCEDURE — 93005 ELECTROCARDIOGRAM TRACING: CPT

## 2018-11-11 PROCEDURE — 80061 LIPID PANEL: CPT

## 2018-11-11 PROCEDURE — 83735 ASSAY OF MAGNESIUM: CPT

## 2018-11-11 PROCEDURE — 71045 X-RAY EXAM CHEST 1 VIEW: CPT

## 2018-11-11 PROCEDURE — 90686 IIV4 VACC NO PRSV 0.5 ML IM: CPT

## 2018-11-11 PROCEDURE — 99291 CRITICAL CARE FIRST HOUR: CPT

## 2018-11-11 PROCEDURE — 82553 CREATINE MB FRACTION: CPT

## 2018-11-11 PROCEDURE — 84484 ASSAY OF TROPONIN QUANT: CPT

## 2018-11-11 PROCEDURE — 85025 COMPLETE CBC W/AUTO DIFF WBC: CPT

## 2018-11-11 PROCEDURE — 80048 BASIC METABOLIC PNL TOTAL CA: CPT

## 2018-11-11 PROCEDURE — 82550 ASSAY OF CK (CPK): CPT

## 2018-11-11 PROCEDURE — 87081 CULTURE SCREEN ONLY: CPT

## 2018-11-11 PROCEDURE — 36415 COLL VENOUS BLD VENIPUNCTURE: CPT

## 2018-11-11 PROCEDURE — 83880 ASSAY OF NATRIURETIC PEPTIDE: CPT

## 2018-11-11 PROCEDURE — 84100 ASSAY OF PHOSPHORUS: CPT

## 2018-11-11 PROCEDURE — 96374 THER/PROPH/DIAG INJ IV PUSH: CPT

## 2018-11-11 RX ADMIN — DILTIAZEM HCL 125 MG/125ML IN DEXTROSE 5% IV SOLN (1 MG/ML) 1 MLS/HR: 125-5/125 SOLUTION at 17:21

## 2018-11-11 RX ADMIN — DILTIAZEM HYDROCHLORIDE 1 MG: 5 INJECTION INTRAVENOUS at 12:18

## 2018-11-11 RX ADMIN — DILTIAZEM HYDROCHLORIDE 1 MG: 5 INJECTION INTRAVENOUS at 12:42

## 2018-11-11 RX ADMIN — METOPROLOL TARTRATE 1 MG: 100 TABLET ORAL at 22:20

## 2018-11-11 RX ADMIN — SUCRALFATE 1 GM: 1 TABLET ORAL at 22:20

## 2018-11-11 RX ADMIN — DILTIAZEM HCL 125 MG/125ML IN DEXTROSE 5% IV SOLN (1 MG/ML) 1 MLS/HR: 125-5/125 SOLUTION at 14:59

## 2018-11-11 RX ADMIN — AMIODARONE HYDROCHLORIDE 1 MLS/HR: 1.5 INJECTION, SOLUTION INTRAVENOUS at 21:15

## 2018-11-11 RX ADMIN — AMIODARONE HYDROCHLORIDE 1 MLS/HR: 1.5 INJECTION, SOLUTION INTRAVENOUS at 17:02

## 2018-11-11 RX ADMIN — LORAZEPAM 1 MG: 0.5 TABLET ORAL at 22:56

## 2018-11-12 LAB
ABNORMAL IP MESSAGE: 1
ADD MAN DIFF?: NO
ANION GAP: 5 (ref 5–13)
BASOPHIL #: 0 10^3/UL (ref 0–0.1)
BASOPHILS %: 0.6 % (ref 0–2)
BLOOD UREA NITROGEN: 14 MG/DL (ref 7–20)
CALCIUM: 8.7 MG/DL (ref 8.4–10.2)
CARBON DIOXIDE: 30 MMOL/L (ref 21–31)
CHLORIDE: 105 MMOL/L (ref 97–110)
CK INDEX: 4.3
CK-MB: 1.37 NG/ML (ref 0–2.4)
CREATINE KINASE: 32 IU/L (ref 23–200)
CREATININE: 0.8 MG/DL (ref 0.61–1.24)
EOSINOPHILS #: 0 10^3/UL (ref 0–0.5)
EOSINOPHILS %: 0 % (ref 0–7)
GLUCOSE: 99 MG/DL (ref 70–220)
HEMATOCRIT: 42.2 % (ref 42–52)
HEMOGLOBIN: 13.5 G/DL (ref 14–18)
IMMATURE GRANS #M: 0 10^3/UL (ref 0–0.03)
IMMATURE GRANS % (M): 0 % (ref 0–0.43)
LYMPHOCYTES #: 0.4 10^3/UL (ref 0.8–2.9)
LYMPHOCYTES %: 12.6 % (ref 15–51)
MAGNESIUM: 2 MG/DL (ref 1.7–2.5)
MEAN CORPUSCULAR HEMOGLOBIN: 28.2 PG (ref 29–33)
MEAN CORPUSCULAR HGB CONC: 32 G/DL (ref 32–37)
MEAN CORPUSCULAR VOLUME: 88.1 FL (ref 82–101)
MEAN PLATELET VOLUME: 10.4 FL (ref 7.4–10.4)
MONOCYTE #: 0.6 10^3/UL (ref 0.3–0.9)
MONOCYTES %: 16.9 % (ref 0–11)
NEUTROPHIL #: 2.3 10^3/UL (ref 1.6–7.5)
NEUTROPHILS %: 69.9 % (ref 39–77)
NUCLEATED RED BLOOD CELLS #: 0 10^3/UL (ref 0–0)
NUCLEATED RED BLOOD CELLS%: 0 /100WBC (ref 0–0)
PLATELET COUNT: 182 10^3/UL (ref 140–415)
POSITIVE DIFF: (no result)
POTASSIUM: 4.4 MMOL/L (ref 3.5–5.1)
RED BLOOD COUNT: 4.79 10^6/UL (ref 4.7–6.1)
RED CELL DISTRIBUTION WIDTH: 16 % (ref 11.5–14.5)
SODIUM: 140 MMOL/L (ref 135–144)
TROPONIN-I: < 0.012 NG/ML (ref 0–0.12)
WHITE BLOOD COUNT: 3.3 10^3/UL (ref 4.8–10.8)

## 2018-11-12 RX ADMIN — APIXABAN 1 MG: 5 TABLET, FILM COATED ORAL at 08:10

## 2018-11-12 RX ADMIN — DILTIAZEM HYDROCHLORIDE 1 MG: 180 CAPSULE, COATED, EXTENDED RELEASE ORAL at 10:55

## 2018-11-12 RX ADMIN — ASPIRIN 1 MG: 81 TABLET, COATED ORAL at 08:09

## 2018-11-12 RX ADMIN — PANTOPRAZOLE SODIUM 1 MG: 40 TABLET, DELAYED RELEASE ORAL at 05:57

## 2018-11-12 RX ADMIN — METOPROLOL TARTRATE 1 MG: 100 TABLET ORAL at 21:10

## 2018-11-12 RX ADMIN — METOPROLOL TARTRATE 1 MG: 100 TABLET ORAL at 08:11

## 2018-11-12 RX ADMIN — SUCRALFATE 1 GM: 1 TABLET ORAL at 08:12

## 2018-11-12 RX ADMIN — BUSPIRONE HYDROCHLORIDE 1 MG: 10 TABLET ORAL at 08:11

## 2018-11-12 RX ADMIN — DILTIAZEM HCL 125 MG/125ML IN DEXTROSE 5% IV SOLN (1 MG/ML) 1 MLS/HR: 125-5/125 SOLUTION at 00:09

## 2018-11-12 RX ADMIN — BUSPIRONE HYDROCHLORIDE 1 MG: 10 TABLET ORAL at 22:05

## 2018-11-12 RX ADMIN — ATORVASTATIN CALCIUM 1 MG: 20 TABLET, FILM COATED ORAL at 21:09

## 2018-11-12 RX ADMIN — FUROSEMIDE 1 MG: 20 TABLET ORAL at 10:55

## 2018-11-12 RX ADMIN — SUCRALFATE 1 GM: 1 TABLET ORAL at 21:09

## 2018-11-12 RX ADMIN — APIXABAN 1 MG: 5 TABLET, FILM COATED ORAL at 21:10

## 2018-11-13 LAB
ABNORMAL IP MESSAGE: 1
ADD MAN DIFF?: NO
ANION GAP: 10 (ref 5–13)
B-TYPE NATRIURETIC PEPTIDE: 1150 PG/ML (ref 0–450)
BASOPHIL #: 0 10^3/UL (ref 0–0.1)
BASOPHILS %: 0.6 % (ref 0–2)
BLOOD UREA NITROGEN: 11 MG/DL (ref 7–20)
CALCIUM: 8.9 MG/DL (ref 8.4–10.2)
CARBON DIOXIDE: 29 MMOL/L (ref 21–31)
CHLORIDE: 101 MMOL/L (ref 97–110)
CHOL/HDL RATIO: 3.7 RATIO
CHOLESTEROL: 167 MG/DL (ref 100–200)
CREATININE: 0.64 MG/DL (ref 0.61–1.24)
EOSINOPHILS #: 0 10^3/UL (ref 0–0.5)
EOSINOPHILS %: 0 % (ref 0–7)
GLUCOSE: 88 MG/DL (ref 70–220)
HDL CHOLESTEROL: 44 MG/DL (ref 31–75)
HEMATOCRIT: 43 % (ref 42–52)
HEMOGLOBIN: 13.6 G/DL (ref 14–18)
IMMATURE GRANS #M: 0.01 10^3/UL (ref 0–0.03)
IMMATURE GRANS % (M): 0.3 % (ref 0–0.43)
LDL CHOLESTEROL,CALCULATED: 103 MG/DL
LYMPHOCYTES #: 0.3 10^3/UL (ref 0.8–2.9)
LYMPHOCYTES %: 10.1 % (ref 15–51)
MAGNESIUM: 1.9 MG/DL (ref 1.7–2.5)
MEAN CORPUSCULAR HEMOGLOBIN: 28.2 PG (ref 29–33)
MEAN CORPUSCULAR HGB CONC: 31.6 G/DL (ref 32–37)
MEAN CORPUSCULAR VOLUME: 89.2 FL (ref 82–101)
MEAN PLATELET VOLUME: 10.6 FL (ref 7.4–10.4)
MONOCYTE #: 0.4 10^3/UL (ref 0.3–0.9)
MONOCYTES %: 12.8 % (ref 0–11)
NEUTROPHIL #: 2.6 10^3/UL (ref 1.6–7.5)
NEUTROPHILS %: 76.2 % (ref 39–77)
NUCLEATED RED BLOOD CELLS #: 0 10^3/UL (ref 0–0)
NUCLEATED RED BLOOD CELLS%: 0 /100WBC (ref 0–0)
PHOSPHORUS: 4.4 MG/DL (ref 2.5–4.9)
PLATELET COUNT: 162 10^3/UL (ref 140–415)
POSITIVE DIFF: (no result)
POTASSIUM: 4 MMOL/L (ref 3.5–5.1)
RED BLOOD COUNT: 4.82 10^6/UL (ref 4.7–6.1)
RED CELL DISTRIBUTION WIDTH: 15.7 % (ref 11.5–14.5)
SODIUM: 140 MMOL/L (ref 135–144)
TRIGLYCERIDES: 100 MG/DL (ref 0–149)
WHITE BLOOD COUNT: 3.4 10^3/UL (ref 4.8–10.8)

## 2018-11-13 RX ADMIN — ASPIRIN 1 MG: 81 TABLET, COATED ORAL at 08:44

## 2018-11-13 RX ADMIN — APIXABAN 1 MG: 5 TABLET, FILM COATED ORAL at 08:45

## 2018-11-13 RX ADMIN — INFLUENZA A VIRUS A/MICHIGAN/45/2015 X-275 (H1N1) ANTIGEN (FORMALDEHYDE INACTIVATED), INFLUENZA A VIRUS A/HONG KONG/4801/2014 X-263B (H3N2) ANTIGEN (FORMALDEHYDE INACTIVATED), INFLUENZA B VIRUS B/PHUKET/3073/2013 ANTIGEN (FORMALDEHYDE INACTIVATED), AND INFLUENZA B VIRUS B/BRISBANE/60/2008 ANTIGEN (FORMALDEHYDE INACTIVATED) 1 ML: 15; 15; 15; 15 INJECTION, SUSPENSION INTRAMUSCULAR at 10:46

## 2018-11-13 RX ADMIN — BUSPIRONE HYDROCHLORIDE 1 MG: 10 TABLET ORAL at 08:53

## 2018-11-13 RX ADMIN — METOPROLOL TARTRATE 1 MG: 100 TABLET ORAL at 08:45

## 2018-11-13 RX ADMIN — DILTIAZEM HYDROCHLORIDE 1 MG: 180 CAPSULE, COATED, EXTENDED RELEASE ORAL at 08:44

## 2018-11-13 RX ADMIN — SUCRALFATE 1 GM: 1 TABLET ORAL at 08:44

## 2018-11-13 RX ADMIN — FUROSEMIDE 1 MG: 20 TABLET ORAL at 08:45

## 2018-11-13 RX ADMIN — AMIODARONE HYDROCHLORIDE 1 MG: 200 TABLET ORAL at 10:44

## 2018-11-13 RX ADMIN — PANTOPRAZOLE SODIUM 1 MG: 40 TABLET, DELAYED RELEASE ORAL at 05:52

## 2019-01-01 ENCOUNTER — HOSPITAL ENCOUNTER (INPATIENT)
Dept: HOSPITAL 10 - E/R | Age: 79
LOS: 5 days | Discharge: HOME | DRG: 897 | End: 2019-01-06
Attending: INTERNAL MEDICINE | Admitting: INTERNAL MEDICINE
Payer: MEDICARE

## 2019-01-01 ENCOUNTER — HOSPITAL ENCOUNTER (INPATIENT)
Dept: HOSPITAL 91 - E/R | Age: 79
LOS: 5 days | Discharge: HOME | DRG: 897 | End: 2019-01-06
Payer: MEDICARE

## 2019-01-01 VITALS — RESPIRATION RATE: 20 BRPM | HEART RATE: 71 BPM | DIASTOLIC BLOOD PRESSURE: 98 MMHG | SYSTOLIC BLOOD PRESSURE: 165 MMHG

## 2019-01-01 VITALS — HEART RATE: 78 BPM | DIASTOLIC BLOOD PRESSURE: 85 MMHG | RESPIRATION RATE: 20 BRPM | SYSTOLIC BLOOD PRESSURE: 156 MMHG

## 2019-01-01 VITALS
HEIGHT: 70 IN | WEIGHT: 208.12 LBS | WEIGHT: 208.12 LBS | HEIGHT: 70 IN | BODY MASS INDEX: 29.79 KG/M2 | BODY MASS INDEX: 29.79 KG/M2

## 2019-01-01 VITALS — HEART RATE: 76 BPM

## 2019-01-01 VITALS — HEART RATE: 79 BPM

## 2019-01-01 DIAGNOSIS — F41.9: ICD-10-CM

## 2019-01-01 DIAGNOSIS — I42.1: ICD-10-CM

## 2019-01-01 DIAGNOSIS — F10.239: Primary | ICD-10-CM

## 2019-01-01 DIAGNOSIS — E87.6: ICD-10-CM

## 2019-01-01 DIAGNOSIS — I48.0: ICD-10-CM

## 2019-01-01 DIAGNOSIS — R25.1: ICD-10-CM

## 2019-01-01 DIAGNOSIS — I10: ICD-10-CM

## 2019-01-01 DIAGNOSIS — I35.0: ICD-10-CM

## 2019-01-01 DIAGNOSIS — Z95.5: ICD-10-CM

## 2019-01-01 LAB
ABNORMAL IP MESSAGE: 1
ADD MAN DIFF?: NO
ALANINE AMINOTRANSFERASE: 33 IU/L (ref 13–69)
ALBUMIN/GLOBULIN RATIO: 1.16
ALBUMIN: 4.3 G/DL (ref 3.3–4.9)
ALKALINE PHOSPHATASE: 83 IU/L (ref 42–121)
ANION GAP: 14 (ref 5–13)
ASPARTATE AMINO TRANSFERASE: 77 IU/L (ref 15–46)
B-TYPE NATRIURETIC PEPTIDE: 2760 PG/ML (ref 0–450)
BASOPHIL #: 0 10^3/UL (ref 0–0.1)
BASOPHILS %: 0.5 % (ref 0–2)
BILIRUBIN,DIRECT: 0 MG/DL (ref 0–0.2)
BILIRUBIN,TOTAL: 1.6 MG/DL (ref 0.2–1.3)
BLOOD UREA NITROGEN: 13 MG/DL (ref 7–20)
CALCIUM: 9.4 MG/DL (ref 8.4–10.2)
CARBON DIOXIDE: 24 MMOL/L (ref 21–31)
CHLORIDE: 103 MMOL/L (ref 97–110)
CREATININE: 0.56 MG/DL (ref 0.61–1.24)
EOSINOPHILS #: 0 10^3/UL (ref 0–0.5)
EOSINOPHILS %: 0 % (ref 0–7)
ETHANOL: < 10 MG/DL (ref 0–0)
GLOBULIN: 3.7 G/DL (ref 1.3–3.2)
GLUCOSE: 114 MG/DL (ref 70–220)
HEMATOCRIT: 40.8 % (ref 42–52)
HEMOGLOBIN: 13.5 G/DL (ref 14–18)
IMMATURE GRANS #M: 0.01 10^3/UL (ref 0–0.03)
IMMATURE GRANS % (M): 0.2 % (ref 0–0.43)
INR: 1.08
LIPASE: 78 U/L (ref 23–300)
LYMPHOCYTES #: 0.3 10^3/UL (ref 0.8–2.9)
LYMPHOCYTES %: 7.2 % (ref 15–51)
MEAN CORPUSCULAR HEMOGLOBIN: 28.7 PG (ref 29–33)
MEAN CORPUSCULAR HGB CONC: 33.1 G/DL (ref 32–37)
MEAN CORPUSCULAR VOLUME: 86.6 FL (ref 82–101)
MEAN PLATELET VOLUME: 11 FL (ref 7.4–10.4)
MONOCYTE #: 0.4 10^3/UL (ref 0.3–0.9)
MONOCYTES %: 10.1 % (ref 0–11)
NEUTROPHIL #: 3.3 10^3/UL (ref 1.6–7.5)
NEUTROPHILS %: 82 % (ref 39–77)
NUCLEATED RED BLOOD CELLS #: 0 10^3/UL (ref 0–0)
NUCLEATED RED BLOOD CELLS%: 0 /100WBC (ref 0–0)
PARTIAL THROMBOPLASTIN TIME: 31.6 SEC (ref 23–35)
PLATELET COUNT: 141 10^3/UL (ref 140–415)
POSITIVE DIFF: (no result)
POTASSIUM: 4.2 MMOL/L (ref 3.5–5.1)
PROTIME: 14.1 SEC (ref 11.9–14.9)
PT RATIO: 1.1
RED BLOOD COUNT: 4.71 10^6/UL (ref 4.7–6.1)
RED CELL DISTRIBUTION WIDTH: 16.2 % (ref 11.5–14.5)
SODIUM: 141 MMOL/L (ref 135–144)
TOTAL PROTEIN: 8 G/DL (ref 6.1–8.1)
TROPONIN-I: 0.03 NG/ML (ref 0–0.12)
TROPONIN-I: 0.03 NG/ML (ref 0–0.12)
WHITE BLOOD COUNT: 4 10^3/UL (ref 4.8–10.8)

## 2019-01-01 PROCEDURE — 84439 ASSAY OF FREE THYROXINE: CPT

## 2019-01-01 PROCEDURE — 80307 DRUG TEST PRSMV CHEM ANLYZR: CPT

## 2019-01-01 PROCEDURE — 85610 PROTHROMBIN TIME: CPT

## 2019-01-01 PROCEDURE — 80061 LIPID PANEL: CPT

## 2019-01-01 PROCEDURE — 96374 THER/PROPH/DIAG INJ IV PUSH: CPT

## 2019-01-01 PROCEDURE — 85730 THROMBOPLASTIN TIME PARTIAL: CPT

## 2019-01-01 PROCEDURE — 84484 ASSAY OF TROPONIN QUANT: CPT

## 2019-01-01 PROCEDURE — 80053 COMPREHEN METABOLIC PANEL: CPT

## 2019-01-01 PROCEDURE — 83690 ASSAY OF LIPASE: CPT

## 2019-01-01 PROCEDURE — 93005 ELECTROCARDIOGRAM TRACING: CPT

## 2019-01-01 PROCEDURE — 80048 BASIC METABOLIC PNL TOTAL CA: CPT

## 2019-01-01 PROCEDURE — 96375 TX/PRO/DX INJ NEW DRUG ADDON: CPT

## 2019-01-01 PROCEDURE — 85025 COMPLETE CBC W/AUTO DIFF WBC: CPT

## 2019-01-01 PROCEDURE — 83880 ASSAY OF NATRIURETIC PEPTIDE: CPT

## 2019-01-01 PROCEDURE — 99291 CRITICAL CARE FIRST HOUR: CPT

## 2019-01-01 PROCEDURE — 71045 X-RAY EXAM CHEST 1 VIEW: CPT

## 2019-01-01 PROCEDURE — 83735 ASSAY OF MAGNESIUM: CPT

## 2019-01-01 PROCEDURE — 36415 COLL VENOUS BLD VENIPUNCTURE: CPT

## 2019-01-01 PROCEDURE — 84443 ASSAY THYROID STIM HORMONE: CPT

## 2019-01-01 RX ADMIN — THIAMINE HYDROCHLORIDE 1 MLS/HR: 100 INJECTION, SOLUTION INTRAMUSCULAR; INTRAVENOUS at 13:46

## 2019-01-01 RX ADMIN — ONDANSETRON HYDROCHLORIDE 1 MG: 2 INJECTION, SOLUTION INTRAMUSCULAR; INTRAVENOUS at 13:46

## 2019-01-01 RX ADMIN — ATORVASTATIN CALCIUM SCH MG: 20 TABLET, FILM COATED ORAL at 20:47

## 2019-01-01 RX ADMIN — SUCRALFATE 1 GM: 1 TABLET ORAL at 20:47

## 2019-01-01 RX ADMIN — APIXABAN 1 MG: 5 TABLET, FILM COATED ORAL at 20:47

## 2019-01-01 RX ADMIN — LORAZEPAM 1 MG: 4 INJECTION, SOLUTION INTRAMUSCULAR; INTRAVENOUS at 21:52

## 2019-01-01 RX ADMIN — SUCRALFATE SCH GM: 1 TABLET ORAL at 20:47

## 2019-01-01 RX ADMIN — LORAZEPAM SCH MG: 4 INJECTION, SOLUTION INTRAMUSCULAR; INTRAVENOUS at 21:52

## 2019-01-01 RX ADMIN — MAGNESIUM SULFATE HEPTAHYDRATE 1 MLS/HR: 40 INJECTION, SOLUTION INTRAVENOUS at 13:46

## 2019-01-01 RX ADMIN — LORAZEPAM 1 MG: 2 INJECTION, SOLUTION INTRAMUSCULAR; INTRAVENOUS at 13:46

## 2019-01-01 RX ADMIN — APIXABAN SCH MG: 5 TABLET, FILM COATED ORAL at 20:47

## 2019-01-01 RX ADMIN — ATORVASTATIN CALCIUM 1 MG: 20 TABLET, FILM COATED ORAL at 20:47

## 2019-01-01 NOTE — HP
DATE OF ADMISSION: 2019

 

CHIEF COMPLAINT:  Shakiness and shortness of breath since this morning.

 

HISTORY OF PRESENT ILLNESS:  The patient is a 78-year-old  male with history of paroxysmal a
trial fibrillation, severe aortic stenosis, was in his usual state of health until this morning when 
he awoke with shakiness and more dyspnea on exertion than usual.  The patient does have some baseline
 shortness of breath especially with exertion due to his aortic stenosis.  The patient did see his ca
rdiologist last week and arrangements are trying to be made to have a surgical procedure.  The patien
t noted the increased shakiness this morning and increased dyspnea on exertion.  He has been noting i
ncreased anxiety.  He also does drink 6 ounces of vodka daily, which he has done for quite some time.
  The patient presented to the ER because of tremor and the shortness of breath.  He was given Ativan
 when it was felt that it might be from alcohol withdrawal, but patient states he is trying same amou
nt at the same timeframe that he usually does.  The patient did feel much better after the Ativan.  H
e currently does not feel short of breath.  The shakiness is much better.  The patient denies any raquel
st pains, cough, recent illness, dizziness, fever, chills, or night sweats.  The patient is being adm
itted for further management.

 

PAST MEDICAL HISTORY:  Paroxysmal atrial fibrillation, severe aortic stenosis, hypertrophic cardiomyo
carina, hypertension, hyperlipidemia, history of CVA with no residual, coronary artery disease, GERD, 
anxiety, chronic leukopenia.

 

OPERATIONS:  PTCA x2.

 

MEDICATIONS:

1.  Eliquis 5 mg b.i.d.

2.  Aspirin 81 mg daily.

3.  Protonix 40 mg daily.

4.  Carafate 1 gram b.i.d.

5.  Lipitor 20 mg daily.

6.  Ativan 0.5 mg q.4 hours p.r.n.

7.  BuSpar 5 mg b.i.d.

8.  Cardizem  mg daily.

 

ALLERGIES:  THE PATIENT HAS NO KNOWN DRUG ALLERGIES.

 

SOCIAL HISTORY:  The patient drinks roughly 6 ounces of vodka daily, which has been relatively consis
tent for years.  Denies any tobacco use.  He is , retired .

 

FAMILY HISTORY:  Father  at 73 from a CVA.  Mother  at 52 from a CVA.  He has a brother who i
s alive and well.  Two sisters, one  at 52 from a CVA and 1 who is alive and well, son who is
 alive and well.

 

REVIEW OF SYSTEMS:

GENERAL:  Appetite has been good.  Weight has been stable.  The patient denies any fever, chills, nig
ht sweats, or other general complaints.

HEENT:  The patient denies any headache, vision change, ear, nose, throat problems.

RESPIRATORY:  As in HPI.

CARDIOVASCULAR:  As noted in HPI.

GASTROINTESTINAL:  The patient has episodic abdominal pain, diagnosed with GERD in the past.  Denies 
any nausea, vomiting, bright red blood per rectum, melena, or other GI symptoms.

GENITOURINARY:  The patient denies any dysuria, frequency, or other  symptoms.

NEUROLOGIC:  The patient denies any numbness, tingling, weakness, or other focal neurologic symptoms.


 

PHYSICAL EXAMINATION:

VITAL SIGNS:  Temperature 98.1, pulse 73, blood pressure 137/81, respirations 29, 100% sat.

GENERAL APPEARANCE:  This is an overweight  male in no acute distress.  He appears nontoxic.


HEENT:  Normocephalic, atraumatic.  Sclerae anicteric.  Oropharynx is clear.

NECK:  Supple, no adenopathy.

LUNGS:  Clear to auscultation.

CARDIAC:  Regular rate and rhythm with III/VI systolic murmur.

ABDOMEN:  Bowel sounds are present.  Abdomen is soft, nontender, nondistended.

BACK:  There is no hepatojugular reflux.

EXTREMITIES:  No cyanosis, clubbing, or edema.

NEUROLOGIC:  The patient is alert and oriented x3 with no focal neurologic findings.

 

DATA:  White blood cell count 4, hemoglobin 13.5, hematocrit 40.8, platelets 141.  Sodium 141, potass
ium 4.2, chloride 103, bicarbonate 24, BUN 13, creatinine 0.56, glucose 114.  Bilirubin 1.6, AST 77, 
ALT 33.  Troponin 0.026, BNP 2760.  Lipase 78.  INR 1.05, PTT 31.6.  Chest x-ray:  No changes from .  EKG normal sinus rhythm, 72, LVH, and no acute changes.

 

IMPRESSION:

1.  Tremor, question whether secondary to alcohol withdrawal, though the patient's alcohol consumptio
n has been stable.  The patient does have a history of anxiety on a benzodiazepine.  The patient did 
respond well to benzodiazepines and we will continue during his hospitalization.

2.  Shortness of breath.  The patient with episodes of shortness of breath or increased shortness of 
breath over his baseline.  He is saturating well.  His BNP is slightly elevated, but his saturations 
currently at 100%.  Chest x-ray showed no acute changes.  We will be careful with diuresis due to his
 severe aortic stenosis.

3.  Anxiety as noted above.

4.  Hypertension.  Blood pressure a little more elevated today.  The patient did not take his blood p
ressure medicine.

5.  Hyperlipidemia.

6.  Coronary artery disease.

7.  Gastroesophageal reflux disease.

8.  Severe aortic stenosis.

9.  Paroxysmal fibrillation.

 

PLAN:  Admit to telemetry.  Benzodiazepines IV.  Continue current medications except for hold oral At
katlyn and BuSpar.  Monitor labs.  Check lipids, TSH, free T4, cardiology eval.  Check troponins q.6 x3
.

 

 

Dictated By: LISA NAVARRETE/RENE

DD:    2019 17:47:23

DT:    2019 20:08:58

Conf#: [id number]

DID#:  [id number]

## 2019-01-01 NOTE — ERD
ER Documentation


Chief Complaint


Chief Complaint





SOB this AM onset @rest; mid-CP. denies asthma/CHF. hx stentx2





HPI


78-year-old alcoholic man presenting with palpitations, tremors, not feeling 


well, shortness of breath.  He states he stopped drinking yesterday some time 


but prior to that was drinking heavily.  He is he has a long history of alcohol 


withdrawal symptoms.  He denies vomiting, no diarrhea, no blood per rectum or 


melena, no chest pain, no cough or fever.





ROS


All systems reviewed and are negative except as per history of present illness.





Medications


Home Meds


Reported Medications


Diltiazem Hcl* (Cardizem CD*) 240 Mg Cap.sr.24h, 240 MG PO DAILY, #30 CAP


   1/1/19


Lorazepam* (Lorazepam*) 0.5 Mg Tablet, 0.5 MG PO HS PRN for ANXIETY, TAB


   10/8/18


Metoclopramide Hcl* (Metoclopramide Hcl*) 5 Mg Tablet, 5 MG PO Q8H PRN for 


NAUSEA AND OR VOMITING, TAB


   10/8/18


Sucralfate* (Carafate*) 1 Gm Tab, 1 GM PO BID, TAB


   2/10/18


Simvastatin* (Zocor*) 40 Mg Tablet, 40 MG PO QHS, #30 TAB


   2/10/18


Pantoprazole* (Protonix*) 40 Mg Tablet.dr, 40 MG PO DAILY, TAB


   2/10/18


Metoprolol Tartrate* (Lopressor*) 100 Mg Tablet, 100 MG PO BID, #60 TAB


   2/10/18


Buspirone Hcl* (Buspar*) 5 Mg Tab, 5 MG PO BID, TAB


   2/10/18


Aspirin (Low Dose Aspirin) 81 Mg Tablet.dr, 81 MG PO DAILY, #30 TAB


   2/10/18


Apixaban* (Eliquis*) 5 Mg Tablet, 5 MG PO BID, TAB


   2/10/18


Discontinued Reported Medications


Diltiazem Hcl* (Diltiazem XT) 180 Mg Capsule.er, 180 MG PO DAILY, #30 CAP


   10/8/18


Discontinued Scripts


Amiodarone Hcl* (Amiodarone Hcl*) 200 Mg Tablet, 400 MG PO BID for 30 Days, #60 


TAB


   Prov:ASAD VAUGHN MD         11/13/18





Allergies


Allergies:  


Coded Allergies:  


     No Known Allergies (Verified  Allergy, Unknown, 1/1/19)





PMhx/Soc


Atrial fibrillation, hypertrophic obstructive cardiomyopathy, aortic stenosis, 


CAD stent to RCA and LAD, GERD, cholelithiasis, anxiety, hypertension, alcohol 


abuse, posterior ischemic stroke, chronic recurrent abdominal pain, recurrent 


chest pain


History of Surgery:  No


Anesthesia Reaction:  No


Hx Neurological Disorder:  No


Hx Respiratory Disorders:  No


Hx Cardiac Disorders:  Yes (HTN, STENTS,  AORTIC STENOSIS, AFIB)


Hx Psychiatric Problems:  No


Hx Miscellaneous Medical Probl:  No


Hx Alcohol Use:  No


Hx Substance Use:  No


Hx Tobacco Use:  Yes (QUIT 54 YRS AGO)





FmHx


Family History:  No diabetes





Physical Exam


Vitals





Vital Signs


  Date      Temp  Pulse  Resp  B/P (MAP)   Pulse Ox  O2          O2 Flow    FiO2


Time                                                 Delivery    Rate


    1/1/19           73    29      137/81       100  Room Air


     12:56                           (99)


    1/1/19  98.1     76    30      177/88        98


     12:32                          (117)





Physical Exam


GENERAL: Well-developed, appears dehydrated, afebrile, anxious and tremulous


HEENT: Dry mucous membranes, pink conjunctiva, no cervical spine tenderness or 


step-off deformities, no goiter, no jaundice or icterus, extraocular movements 


intact without pain. 


NEURO: Alert and oriented 3, moving all extremities, pupils equal round 


reactive to light, positive tongue fasciculation and upper extremity tremors 


consistent with withdrawal


CARDIAC: Regular rate and rhythm, no murmurs rubs or gallops


LUNGS: Clear bilaterally no wheezing crackles or stridor


ABDOMEN: Soft nontender, no guarding, no rigidity, no rebound, no psoas sign no 


obturator sign. 


SKIN: Warm and dry to touch, no abrasions, contusions, or hematomas, no 


lacerations, no ecchymosis, no target lesions, and without ulcers


EXTREMITIES: No clubbing cyanosis or edema, calves are bilaterally symmetrical, 


no Homans sign, no popliteal cord sign. Distal pulses equal and bilateral


PSYCH: Anxious


Result Diagram:  


1/1/19 1330                                                                     


          1/1/19 1330





Results 24 hrs





Laboratory Tests


              Test
                                  1/1/19
13:30


              White Blood Count                      4.0 10^3/ul


              Red Blood Count                       4.71 10^6/ul


              Hemoglobin                               13.5 g/dl


              Hematocrit                                  40.8 %


              Mean Corpuscular Volume                    86.6 fl


              Mean Corpuscular Hemoglobin                28.7 pg


              Mean Corpuscular Hemoglobin
Concent     33.1 g/dl 



              Red Cell Distribution Width                 16.2 %


              Platelet Count                         141 10^3/UL


              Mean Platelet Volume                       11.0 fl


              Immature Granulocytes %                    0.200 %


              Neutrophils %                               82.0 %


              Lymphocytes %                                7.2 %


              Monocytes %                                 10.1 %


              Eosinophils %                                0.0 %


              Basophils %                                  0.5 %


              Nucleated Red Blood Cells %            0.0 /100WBC


              Immature Granulocytes #              0.010 10^3/ul


              Neutrophils #                          3.3 10^3/ul


              Lymphocytes #                          0.3 10^3/ul


              Monocytes #                            0.4 10^3/ul


              Eosinophils #                          0.0 10^3/ul


              Basophils #                            0.0 10^3/ul


              Nucleated Red Blood Cells #            0.0 10^3/ul


              Prothrombin Time                          14.1 Sec


              Prothrombin Time Ratio                         1.1


              INR International Normalized
Ratio           1.08 



              Activated Partial
Thromboplast Time      31.6 Sec 



              Sodium Level                            141 mmol/L


              Potassium Level                         4.2 mmol/L


              Chloride Level                          103 mmol/L


              Carbon Dioxide Level                     24 mmol/L


              Anion Gap                                       14


              Blood Urea Nitrogen                       13 mg/dl


              Creatinine                              0.56 mg/dl


              Est Glomerular Filtrat Rate
mL/min    mL/min 



              Glucose Level                            114 mg/dl


              Calcium Level                            9.4 mg/dl


              Total Bilirubin                          1.6 mg/dl


              Direct Bilirubin                        0.00 mg/dl


              Indirect Bilirubin                       1.6 mg/dl


              Aspartate Amino Transf
(AST/SGOT)         77 IU/L 



              Alanine Aminotransferase
(ALT/SGPT)       33 IU/L 



              Alkaline Phosphatase                       83 IU/L


              Troponin I                             0.026 ng/ml


              B-Type Natriuretic Peptide              2760 PG/ML


              Total Protein                             8.0 g/dl


              Albumin                                   4.3 g/dl


              Globulin                                 3.70 g/dl


              Albumin/Globulin Ratio                        1.16


              Lipase                                      78 U/L


              Ethyl Alcohol Level                  < 10.0 mg/dl





Current Medications


 Medications
   Dose
          Sig/Graciela
       Start Time
   Status  Last


 (Trade)       Ordered        Route
 PRN     Stop Time              Admin
Dose


                              Reason                                Admin


 Sodium         1,000 ml @ 
   Q1H STAT
      1/1/19        DC            1/1/19


Chloride       1,000 mls/hr   IV
            13:25
 1/1/19                13:46



                                             14:24


 Ondansetron    4 mg           ONCE  STAT
    1/1/19        DC            1/1/19


HCl
  (Zofran                 IV
            13:25
 1/1/19                13:46



Inj)                                         13:27


 Lorazepam
     1 mg           ONCE  ONCE
    1/1/19        DC            1/1/19


(Ativan)                      IV
            13:30
 1/1/19                13:46



                                             13:31


 Magnesium      50 ml @ 25
    ONCE  ONCE
    1/1/19        DC            1/1/19


Sulfate        mls/hr         IVPB
          13:30
 1/1/19                13:46



                                             15:29








Procedures/MDM


IV line was established patient was placed on cardiac monitor rhythm strip 


revealed a sinus rhythm at about 70 bpm with upright P and T waves.  Patient was


afebrile





EKG performed, read by me revealed a normal sinus rhythm at 72 bpm, normal axis,


right bundle branch block, no concerning ST elevations or depressions noted, 


prolonged QT of 525 ms





I administered lorazepam 1 mg IV for alcohol withdrawal symptoms, as well as 


magnesium 2 g IV for prolonged QT, 1 L normal saline IV, Zofran 4 mg IV for srinivas


sea.





1 view chest x-ray performed, read by me revealed cardiomegaly and mild 


congestion bilaterally, no acute infiltrates, no pneumothorax





CBC reveals a mild leukopenia, electrolytes are unremarkable, liver function 


tests normal, troponin negative, BNP elevated at about 3000, coagulation profile


normal, ethanol level negative.





Critical Care:


   Time:             52 minutes, this was time separate from other billable 


procedures.


   Treatments/Evaluations:   Close monitoring and treatment of unstable vital s


igns, cardiorespiratory, and neurologic status, while maintaining tight balance 


of fluid, respiratory, and cardiac interventions.





Patient presented hypertensive, tremulous, and actively withdrawing.  I suspect 


most of his symptoms are mainly due to alcohol withdrawal although he does have 


a component of congestive heart failure and will be admitted to telemetry 


setting for continued medical management





Departure


Diagnosis:  


   Primary Impression:  


   Shortness of breath


   Additional Impressions:  


   Alcohol withdrawal


   Complication of substance-induced condition:  uncomplicated  Qualified Codes:


    F10.230 - Alcohol dependence with withdrawal, uncomplicated


   CHF (congestive heart failure)


   Heart failure type:  systolic  Heart failure chronicity:  acute  Qualified 


   Codes:  I50.21 - Acute systolic (congestive) heart failure


Condition:  SELAM Miller MD              Jan 1, 2019 12:50

## 2019-01-02 VITALS — DIASTOLIC BLOOD PRESSURE: 87 MMHG | SYSTOLIC BLOOD PRESSURE: 173 MMHG | RESPIRATION RATE: 18 BRPM | HEART RATE: 98 BPM

## 2019-01-02 VITALS — HEART RATE: 93 BPM

## 2019-01-02 VITALS — HEART RATE: 85 BPM

## 2019-01-02 VITALS — DIASTOLIC BLOOD PRESSURE: 71 MMHG | HEART RATE: 97 BPM | SYSTOLIC BLOOD PRESSURE: 128 MMHG | RESPIRATION RATE: 18 BRPM

## 2019-01-02 VITALS — HEART RATE: 86 BPM | RESPIRATION RATE: 19 BRPM | DIASTOLIC BLOOD PRESSURE: 79 MMHG | SYSTOLIC BLOOD PRESSURE: 136 MMHG

## 2019-01-02 VITALS — DIASTOLIC BLOOD PRESSURE: 85 MMHG | HEART RATE: 91 BPM | RESPIRATION RATE: 18 BRPM | SYSTOLIC BLOOD PRESSURE: 143 MMHG

## 2019-01-02 VITALS — HEART RATE: 78 BPM | DIASTOLIC BLOOD PRESSURE: 90 MMHG | RESPIRATION RATE: 18 BRPM | SYSTOLIC BLOOD PRESSURE: 168 MMHG

## 2019-01-02 VITALS — HEART RATE: 96 BPM | RESPIRATION RATE: 18 BRPM | SYSTOLIC BLOOD PRESSURE: 121 MMHG | DIASTOLIC BLOOD PRESSURE: 85 MMHG

## 2019-01-02 VITALS — HEART RATE: 76 BPM

## 2019-01-02 VITALS — RESPIRATION RATE: 18 BRPM | SYSTOLIC BLOOD PRESSURE: 161 MMHG | HEART RATE: 83 BPM | DIASTOLIC BLOOD PRESSURE: 84 MMHG

## 2019-01-02 VITALS — HEART RATE: 81 BPM

## 2019-01-02 VITALS — HEART RATE: 86 BPM

## 2019-01-02 VITALS — HEART RATE: 79 BPM

## 2019-01-02 LAB
ABNORMAL IP MESSAGE: 1
ADD MAN DIFF?: NO
ALANINE AMINOTRANSFERASE: 30 IU/L (ref 13–69)
ALBUMIN/GLOBULIN RATIO: 1.12
ALBUMIN: 3.5 G/DL (ref 3.3–4.9)
ALKALINE PHOSPHATASE: 62 IU/L (ref 42–121)
ANION GAP: 10 (ref 5–13)
ASPARTATE AMINO TRANSFERASE: 51 IU/L (ref 15–46)
B-TYPE NATRIURETIC PEPTIDE: 2160 PG/ML (ref 0–450)
BASOPHIL #: 0 10^3/UL (ref 0–0.1)
BASOPHILS %: 0.8 % (ref 0–2)
BILIRUBIN,DIRECT: 0 MG/DL (ref 0–0.2)
BILIRUBIN,TOTAL: 1.5 MG/DL (ref 0.2–1.3)
BLOOD UREA NITROGEN: 10 MG/DL (ref 7–20)
CALCIUM: 8.5 MG/DL (ref 8.4–10.2)
CARBON DIOXIDE: 27 MMOL/L (ref 21–31)
CHLORIDE: 104 MMOL/L (ref 97–110)
CHOL/HDL RATIO: 3.5 RATIO
CHOLESTEROL: 164 MG/DL (ref 100–200)
CREATININE: 0.62 MG/DL (ref 0.61–1.24)
EOSINOPHILS #: 0 10^3/UL (ref 0–0.5)
EOSINOPHILS %: 0 % (ref 0–7)
FREE T4 (FREE THYROXINE): 1.24 NG/DL (ref 0.78–2.44)
GLOBULIN: 3.1 G/DL (ref 1.3–3.2)
GLUCOSE: 89 MG/DL (ref 70–220)
HDL CHOLESTEROL: 46 MG/DL (ref 31–75)
HEMATOCRIT: 38.3 % (ref 42–52)
HEMOGLOBIN: 12.6 G/DL (ref 14–18)
IMMATURE GRANS #M: 0 10^3/UL (ref 0–0.03)
IMMATURE GRANS % (M): 0 % (ref 0–0.43)
LDL CHOLESTEROL,CALCULATED: 101 MG/DL
LYMPHOCYTES #: 0.4 10^3/UL (ref 0.8–2.9)
LYMPHOCYTES %: 9.8 % (ref 15–51)
MEAN CORPUSCULAR HEMOGLOBIN: 29.1 PG (ref 29–33)
MEAN CORPUSCULAR HGB CONC: 32.9 G/DL (ref 32–37)
MEAN CORPUSCULAR VOLUME: 88.5 FL (ref 82–101)
MEAN PLATELET VOLUME: 11.4 FL (ref 7.4–10.4)
MONOCYTE #: 0.5 10^3/UL (ref 0.3–0.9)
MONOCYTES %: 14.2 % (ref 0–11)
NEUTROPHIL #: 2.7 10^3/UL (ref 1.6–7.5)
NEUTROPHILS %: 75.2 % (ref 39–77)
NUCLEATED RED BLOOD CELLS #: 0 10^3/UL (ref 0–0)
NUCLEATED RED BLOOD CELLS%: 0 /100WBC (ref 0–0)
PLATELET COUNT: 117 10^3/UL (ref 140–415)
POSITIVE DIFF: (no result)
POTASSIUM: 3.6 MMOL/L (ref 3.5–5.1)
RED BLOOD COUNT: 4.33 10^6/UL (ref 4.7–6.1)
RED CELL DISTRIBUTION WIDTH: 16.1 % (ref 11.5–14.5)
SODIUM: 141 MMOL/L (ref 135–144)
THYROID STIMULATING HORMONE: 3.8 MIU/L (ref 0.47–4.68)
TOTAL PROTEIN: 6.6 G/DL (ref 6.1–8.1)
TRIGLYCERIDES: 84 MG/DL (ref 0–149)
TROPONIN-I: 0.03 NG/ML (ref 0–0.12)
WHITE BLOOD COUNT: 3.6 10^3/UL (ref 4.8–10.8)

## 2019-01-02 RX ADMIN — PANTOPRAZOLE SODIUM SCH MG: 40 TABLET, DELAYED RELEASE ORAL at 05:47

## 2019-01-02 RX ADMIN — LORAZEPAM 1 MG: 4 INJECTION, SOLUTION INTRAMUSCULAR; INTRAVENOUS at 05:47

## 2019-01-02 RX ADMIN — LORAZEPAM SCH MG: 4 INJECTION, SOLUTION INTRAMUSCULAR; INTRAVENOUS at 14:01

## 2019-01-02 RX ADMIN — SUCRALFATE SCH GM: 1 TABLET ORAL at 08:39

## 2019-01-02 RX ADMIN — SUCRALFATE 1 GM: 1 TABLET ORAL at 20:48

## 2019-01-02 RX ADMIN — DILTIAZEM HYDROCHLORIDE 1 MG: 240 CAPSULE, EXTENDED RELEASE ORAL at 08:40

## 2019-01-02 RX ADMIN — APIXABAN SCH MG: 5 TABLET, FILM COATED ORAL at 08:39

## 2019-01-02 RX ADMIN — ASPIRIN 81 MG CHEWABLE TABLET 1 MG: 81 TABLET CHEWABLE at 08:39

## 2019-01-02 RX ADMIN — APIXABAN SCH MG: 5 TABLET, FILM COATED ORAL at 20:48

## 2019-01-02 RX ADMIN — ATORVASTATIN CALCIUM 1 MG: 20 TABLET, FILM COATED ORAL at 20:48

## 2019-01-02 RX ADMIN — PANTOPRAZOLE SODIUM 1 MG: 40 TABLET, DELAYED RELEASE ORAL at 05:47

## 2019-01-02 RX ADMIN — LORAZEPAM SCH MG: 4 INJECTION, SOLUTION INTRAMUSCULAR; INTRAVENOUS at 05:47

## 2019-01-02 RX ADMIN — SUCRALFATE 1 GM: 1 TABLET ORAL at 08:39

## 2019-01-02 RX ADMIN — ATORVASTATIN CALCIUM SCH MG: 20 TABLET, FILM COATED ORAL at 20:48

## 2019-01-02 RX ADMIN — ASPIRIN 81 MG SCH MG: 81 TABLET ORAL at 08:39

## 2019-01-02 RX ADMIN — DILTIAZEM HYDROCHLORIDE SCH MG: 240 CAPSULE, EXTENDED RELEASE ORAL at 08:40

## 2019-01-02 RX ADMIN — SUCRALFATE SCH GM: 1 TABLET ORAL at 20:48

## 2019-01-02 RX ADMIN — LORAZEPAM 1 MG: 4 INJECTION, SOLUTION INTRAMUSCULAR; INTRAVENOUS at 22:28

## 2019-01-02 RX ADMIN — LORAZEPAM 1 MG: 4 INJECTION, SOLUTION INTRAMUSCULAR; INTRAVENOUS at 14:01

## 2019-01-02 RX ADMIN — APIXABAN 1 MG: 5 TABLET, FILM COATED ORAL at 08:39

## 2019-01-02 RX ADMIN — LORAZEPAM SCH MG: 4 INJECTION, SOLUTION INTRAMUSCULAR; INTRAVENOUS at 22:28

## 2019-01-02 RX ADMIN — APIXABAN 1 MG: 5 TABLET, FILM COATED ORAL at 20:48

## 2019-01-02 NOTE — PN
Date/Time of Note


Date/Time of Note


DATE: 1/2/19 


TIME: 13:40





Assessment/Plan


VTE Prophylaxis


Risk score (from Ns)>0 risk:  4


SCD applied (from Ns):  Yes


Pharmacological prophylaxis:  apixaban





Lines/Catheters


IV Catheter Type (from Nrs):  Saline Lock


Urinary Cath still in place:  No





Assessment/Plan


Assessment/Plan


A: tremor ?etio


sob


elevated bnp


anxiety


HTN


severe Aortic stenosis





P: cardiology consult pending


cont current rx


Result Diagram:  


1/2/19 0515                                                                     


          1/2/19 0515





Results 24hrs





Laboratory Tests


 Test
                                1/1/19
19:21  1/2/19
01:04  1/2/19
05:15


 Troponin I                                0.025         0.033


 White Blood Count                                                      3.6  L


 Red Blood Count                                                       4.33  L


 Hemoglobin                                                            12.6  L


 Hematocrit                                                            38.3  L


 Mean Corpuscular Volume                                                88.5


 Mean Corpuscular Hemoglobin                                            29.1


 Mean Corpuscular Hemoglobin
Concent  
             
                  32.9  



 Red Cell Distribution Width                                           16.1  H


 Platelet Count                                                         117  L


 Mean Platelet Volume                                                  11.4  H


 Immature Granulocytes %                                              0.000  L


 Neutrophils %                                                          75.2


 Lymphocytes %                                                          9.8  L


 Monocytes %                                                           14.2  H


 Eosinophils %                                                           0.0


 Basophils %                                                             0.8


 Nucleated Red Blood Cells %                                             0.0


 Immature Granulocytes #                                               0.000


 Neutrophils #                                                           2.7


 Lymphocytes #                                                          0.4  L


 Monocytes #                                                             0.5


 Eosinophils #                                                           0.0


 Basophils #                                                             0.0


 Nucleated Red Blood Cells #                                             0.0


 Sodium Level                                                            141


 Potassium Level                                                         3.6


 Chloride Level                                                          104


 Carbon Dioxide Level                                                     27


 Anion Gap                                                                10


 Blood Urea Nitrogen                                                      10


 Creatinine                                                             0.62


 Est Glomerular Filtrat Rate
mL/min   
             
               



 Glucose Level                                                            89


 Calcium Level                                                           8.5


 Total Bilirubin                                                        1.5  H


 Direct Bilirubin                                                       0.00


 Indirect Bilirubin                                                     1.5  H


 Aspartate Amino Transf
(AST/SGOT)    
             
                   51  H



 Alanine Aminotransferase
(ALT/SGPT)  
             
                    30  



 Alkaline Phosphatase                                                     62


 B-Type Natriuretic Peptide                                            2160  H


 Total Protein                                                          6.6  #


 Albumin                                                                 3.5


 Globulin                                                               3.10


 Albumin/Globulin Ratio                                                 1.12


 Triglycerides Level                                                      84


 Cholesterol Level                                                       164


 LDL Cholesterol, Calculated                                             101


 HDL Cholesterol                                                          46


 Cholesterol/HDL Ratio                                                   3.5


 Thyroid Stimulating Hormone
(TSH)    
             
                 3.800  



 Free Thyroxine                                                         1.24








Subjective


24 Hr Interval Summary


Free Text/Dictation


Pt feeling better.  No tremulousness, sob, cp, dizziness.  Not sure what 


triggered episode yesterday.  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 


for wife.





Exam/Review of Systems


Vital Signs


Vitals





Vital Signs


  Date      Temp  Pulse  Resp  B/P (MAP)   Pulse Ox  O2          O2 Flow    FiO2


Time                                                 Delivery    Rate


    1/2/19           86


     12:32


    1/2/19  98.7           18      143/85        95


     11:06                          (104)


    1/2/19                                           Room Air


     04:37








Intake and Output





1/1/19 1/1/19 1/2/19





1515:00


23:00


07:00





IntakeIntake Total


400 ml





BalanceBalance


400 ml














Exam


gen- nad, nontoxic


lungs- CTA


heart- RRR


abd- +BS, soft, nontender


ext- no edema





Medications


Medications





Current Medications


Apixaban (Eliquis) 5 mg BID PO  Last administered on 1/2/19at 08:39; Admin Dose 


5 MG;  Start 1/1/19 at 21:00


Aspirin (Aspirin) 81 mg DAILY PO  Last administered on 1/2/19at 08:39; Admin 


Dose 81 MG;  Start 1/2/19 at 09:00


Metoclopramide HCl (Reglan) 5 mg TID  PRN PO NAUSEA;  Start 1/1/19 at 17:00


Pantoprazole (Protonix Tab) 40 mg DAILY@06 PO  Last administered on 1/2/19at 


05:47; Admin Dose 40 MG;  Start 1/2/19 at 06:00


Sucralfate (Carafate) 1 gm BID PO  Last administered on 1/2/19at 08:39; Admin 


Dose 1 GM;  Start 1/1/19 at 21:00


Diltiazem HCl (Cardizem Cd) 240 mg DAILY PO  Last administered on 1/2/19at 


08:40; Admin Dose 240 MG;  Start 1/2/19 at 09:00


Atorvastatin Calcium (Lipitor) 20 mg HS PO  Last administered on 1/1/19at 20:47;


Admin Dose 20 MG;  Start 1/1/19 at 21:00


Lorazepam (Ativan) 0.5 mg Q8 IV  Last administered on 1/2/19at 05:47; Admin Dose


0.5 MG;  Start 1/1/19 at 22:00











LISA BENNETT MD         Jan 2, 2019 13:45

## 2019-01-02 NOTE — CONS
Date/Time of Note


Date/Time of Note


DATE: 1/2/19 


TIME: 19:16





Assessment/Plan


78 yowm w/ severe AS, HCM (w/ LVOT obstruction), PAF, CAD (PCI of LAD, RCA), htn


and hld who presented w/ cp or sob.  Pt is well known to me from clinic.  He has


both severe AS and HCM w/ LVOT obstruction, which complicates the treatment.  


Was making arrangements for a possible ETOH septal ablation prior to AVR 


(possible TAVR).  Assessment of pt is limited because hx is provided by him and 


may not be reliable.  (Will try to contact his daughter).  He was stable when 


seen in clinic a few weeks ago.  He does not appear to be in CHF.  Also, he has 


been in stable sinus rhythm during this admission (prior admission was due to 


afib w/ RVR).  Pt felt better w/ ativan, so may be some element of withdrawal.  


Continue cardiac meds.  Possible discharge tomorrow if continue to improve.


Result Diagram:  


1/2/19 0515                                                                     


          1/2/19 0515





Results 24hrs





Laboratory Tests


 Test
                                1/1/19
19:21  1/2/19
01:04  1/2/19
05:15


 Troponin I                                0.025         0.033


 White Blood Count                                                      3.6  L


 Red Blood Count                                                       4.33  L


 Hemoglobin                                                            12.6  L


 Hematocrit                                                            38.3  L


 Mean Corpuscular Volume                                                88.5


 Mean Corpuscular Hemoglobin                                            29.1


 Mean Corpuscular Hemoglobin
Concent  
             
                  32.9  



 Red Cell Distribution Width                                           16.1  H


 Platelet Count                                                         117  L


 Mean Platelet Volume                                                  11.4  H


 Immature Granulocytes %                                              0.000  L


 Neutrophils %                                                          75.2


 Lymphocytes %                                                          9.8  L


 Monocytes %                                                           14.2  H


 Eosinophils %                                                           0.0


 Basophils %                                                             0.8


 Nucleated Red Blood Cells %                                             0.0


 Immature Granulocytes #                                               0.000


 Neutrophils #                                                           2.7


 Lymphocytes #                                                          0.4  L


 Monocytes #                                                             0.5


 Eosinophils #                                                           0.0


 Basophils #                                                             0.0


 Nucleated Red Blood Cells #                                             0.0


 Sodium Level                                                            141


 Potassium Level                                                         3.6


 Chloride Level                                                          104


 Carbon Dioxide Level                                                     27


 Anion Gap                                                                10


 Blood Urea Nitrogen                                                      10


 Creatinine                                                             0.62


 Est Glomerular Filtrat Rate
mL/min   
             
               



 Glucose Level                                                            89


 Calcium Level                                                           8.5


 Total Bilirubin                                                        1.5  H


 Direct Bilirubin                                                       0.00


 Indirect Bilirubin                                                     1.5  H


 Aspartate Amino Transf
(AST/SGOT)    
             
                   51  H



 Alanine Aminotransferase
(ALT/SGPT)  
             
                    30  



 Alkaline Phosphatase                                                     62


 B-Type Natriuretic Peptide                                            2160  H


 Total Protein                                                          6.6  #


 Albumin                                                                 3.5


 Globulin                                                               3.10


 Albumin/Globulin Ratio                                                 1.12


 Triglycerides Level                                                      84


 Cholesterol Level                                                       164


 LDL Cholesterol, Calculated                                             101


 HDL Cholesterol                                                          46


 Cholesterol/HDL Ratio                                                   3.5


 Thyroid Stimulating Hormone
(TSH)    
             
                 3.800  



 Free Thyroxine                                                         1.24








Consultation Date/Type/Reason


Admit Date/Time


Jan 1, 2019 at 14:29


Date of Consultation:  Jan 2, 2019


Type of Consult


Cardiology


Reason for Consultation


sob, AS, CAD





Hx of Present Illness


78 yowm w/ severe AS, HCM (w/ LVOT obstruction), PAF, CAD (PCI of LAD, RCA), htn


and hld who presented w/ cp or sob.  Pt states he awoke yesterday and had chest 


pain and sob.  There was also concern for ETOH withdrawal, though pt states he 


only drinks a small amount daily.  He feels better today and states sob has 


improved. 


    Pt is well known to me from outpt clinic.  He is being evaluated for an AVR 


(possible TAVR).  Unfortunately, he also has HCM w/ LVOT obstruction, which 


complicates the treatment.  Making arrangements for a possible ETOH septal 


ablation prior to AVR (possible TAVR).  Pt was last see a few weeks ago (mid-


December), and he was stable then.





Past Medical History


Severe AS


HCM w/ LVOT obstruction


afib


CAD (PCI of LAD and RCA)


htn 


hld


Medications





Current Medications


Apixaban (Eliquis) 5 mg BID PO  Last administered on 1/2/19at 08:39; Admin Dose 


5 MG;  Start 1/1/19 at 21:00


Aspirin (Aspirin) 81 mg DAILY PO  Last administered on 1/2/19at 08:39; Admin 


Dose 81 MG;  Start 1/2/19 at 09:00


Metoclopramide HCl (Reglan) 5 mg TID  PRN PO NAUSEA;  Start 1/1/19 at 17:00


Pantoprazole (Protonix Tab) 40 mg DAILY@06 PO  Last administered on 1/2/19at 


05:47; Admin Dose 40 MG;  Start 1/2/19 at 06:00


Sucralfate (Carafate) 1 gm BID PO  Last administered on 1/2/19at 08:39; Admin 


Dose 1 GM;  Start 1/1/19 at 21:00


Diltiazem HCl (Cardizem Cd) 240 mg DAILY PO  Last administered on 1/2/19at 08


:40; Admin Dose 240 MG;  Start 1/2/19 at 09:00


Atorvastatin Calcium (Lipitor) 20 mg HS PO  Last administered on 1/1/19at 20:47;


Admin Dose 20 MG;  Start 1/1/19 at 21:00


Lorazepam (Ativan) 0.5 mg Q8 IV  Last administered on 1/2/19at 14:01; Admin Dose


0.5 MG;  Start 1/1/19 at 22:00


Allergies:  


Coded Allergies:  


     No Known Allergies (Verified  Allergy, Unknown, 1/1/19)





Social History


Smoking Status:  Former smoker





Exam/Review of Systems


Vital Signs


Vitals





Vital Signs


  Date      Temp  Pulse  Resp  B/P (MAP)   Pulse Ox  O2          O2 Flow    FiO2


Time                                                 Delivery    Rate


    1/2/19           93


     16:37


    1/2/19  98.5           18      121/85        94


     15:55                           (97)


    1/2/19                                           Room Air


     04:37








Intake and Output





1/1/19 1/1/19 1/2/19





1515:00


23:00


07:00





IntakeIntake Total


400 ml





BalanceBalance


400 ml














Exam


Constitutional:  alert, other (fatigued, NAD)


Neck:  supple; 


   No jvd


Respiratory:  clear to auscultation


Cardiovascular:  regular rate and rhythm, other (3/6 SKYLAR @ RUSB)


Extremities:  other (trace edema)


Neurological:  other (A&O)





Medications


Medications





Current Medications


Apixaban (Eliquis) 5 mg BID PO  Last administered on 1/2/19at 08:39; Admin Dose 


5 MG;  Start 1/1/19 at 21:00


Aspirin (Aspirin) 81 mg DAILY PO  Last administered on 1/2/19at 08:39; Admin 


Dose 81 MG;  Start 1/2/19 at 09:00


Metoclopramide HCl (Reglan) 5 mg TID  PRN PO NAUSEA;  Start 1/1/19 at 17:00


Pantoprazole (Protonix Tab) 40 mg DAILY@06 PO  Last administered on 1/2/19at 


05:47; Admin Dose 40 MG;  Start 1/2/19 at 06:00


Sucralfate (Carafate) 1 gm BID PO  Last administered on 1/2/19at 08:39; Admin 


Dose 1 GM;  Start 1/1/19 at 21:00


Diltiazem HCl (Cardizem Cd) 240 mg DAILY PO  Last administered on 1/2/19at 0


8:40; Admin Dose 240 MG;  Start 1/2/19 at 09:00


Atorvastatin Calcium (Lipitor) 20 mg HS PO  Last administered on 1/1/19at 20:47;


Admin Dose 20 MG;  Start 1/1/19 at 21:00


Lorazepam (Ativan) 0.5 mg Q8 IV  Last administered on 1/2/19at 14:01; Admin Dose


0.5 MG;  Start 1/1/19 at 22:00





Imaging


Imaging


nsr @ 73, borderline LAD, , IVCD - left bundle pattern, no ischemic ST 


changes











NALDO MARTIN                  Jan 2, 2019 19:16

## 2019-01-03 VITALS — RESPIRATION RATE: 19 BRPM | HEART RATE: 81 BPM | DIASTOLIC BLOOD PRESSURE: 71 MMHG | SYSTOLIC BLOOD PRESSURE: 114 MMHG

## 2019-01-03 VITALS — HEART RATE: 93 BPM

## 2019-01-03 VITALS — HEART RATE: 75 BPM | DIASTOLIC BLOOD PRESSURE: 89 MMHG | SYSTOLIC BLOOD PRESSURE: 148 MMHG | RESPIRATION RATE: 20 BRPM

## 2019-01-03 VITALS — RESPIRATION RATE: 20 BRPM | DIASTOLIC BLOOD PRESSURE: 68 MMHG | HEART RATE: 85 BPM | SYSTOLIC BLOOD PRESSURE: 114 MMHG

## 2019-01-03 VITALS — SYSTOLIC BLOOD PRESSURE: 108 MMHG | DIASTOLIC BLOOD PRESSURE: 60 MMHG | RESPIRATION RATE: 20 BRPM | HEART RATE: 89 BPM

## 2019-01-03 VITALS — DIASTOLIC BLOOD PRESSURE: 74 MMHG | HEART RATE: 94 BPM | SYSTOLIC BLOOD PRESSURE: 121 MMHG | RESPIRATION RATE: 19 BRPM

## 2019-01-03 VITALS — RESPIRATION RATE: 20 BRPM | SYSTOLIC BLOOD PRESSURE: 122 MMHG | HEART RATE: 80 BPM | DIASTOLIC BLOOD PRESSURE: 70 MMHG

## 2019-01-03 VITALS — HEART RATE: 75 BPM

## 2019-01-03 VITALS — HEART RATE: 83 BPM

## 2019-01-03 VITALS — HEART RATE: 81 BPM

## 2019-01-03 VITALS — HEART RATE: 76 BPM

## 2019-01-03 RX ADMIN — LORAZEPAM 1 MG: 4 INJECTION, SOLUTION INTRAMUSCULAR; INTRAVENOUS at 06:11

## 2019-01-03 RX ADMIN — METOCLOPRAMIDE HYDROCHLORIDE 1 MG: 5 TABLET ORAL at 08:27

## 2019-01-03 RX ADMIN — SUCRALFATE SCH GM: 1 TABLET ORAL at 08:25

## 2019-01-03 RX ADMIN — ATORVASTATIN CALCIUM 1 MG: 20 TABLET, FILM COATED ORAL at 21:02

## 2019-01-03 RX ADMIN — SUCRALFATE 1 GM: 1 TABLET ORAL at 08:25

## 2019-01-03 RX ADMIN — LORAZEPAM SCH MG: 4 INJECTION, SOLUTION INTRAMUSCULAR; INTRAVENOUS at 06:11

## 2019-01-03 RX ADMIN — LORAZEPAM SCH MG: 4 INJECTION, SOLUTION INTRAMUSCULAR; INTRAVENOUS at 14:12

## 2019-01-03 RX ADMIN — ASPIRIN 81 MG SCH MG: 81 TABLET ORAL at 08:27

## 2019-01-03 RX ADMIN — APIXABAN SCH MG: 5 TABLET, FILM COATED ORAL at 21:02

## 2019-01-03 RX ADMIN — APIXABAN SCH MG: 5 TABLET, FILM COATED ORAL at 08:26

## 2019-01-03 RX ADMIN — DILTIAZEM HYDROCHLORIDE SCH MG: 240 CAPSULE, EXTENDED RELEASE ORAL at 08:27

## 2019-01-03 RX ADMIN — SUCRALFATE 1 GM: 1 TABLET ORAL at 21:02

## 2019-01-03 RX ADMIN — LORAZEPAM 1 MG: 4 INJECTION, SOLUTION INTRAMUSCULAR; INTRAVENOUS at 14:12

## 2019-01-03 RX ADMIN — SUCRALFATE SCH GM: 1 TABLET ORAL at 21:02

## 2019-01-03 RX ADMIN — APIXABAN 1 MG: 5 TABLET, FILM COATED ORAL at 21:02

## 2019-01-03 RX ADMIN — LORAZEPAM 1 MG: 4 INJECTION, SOLUTION INTRAMUSCULAR; INTRAVENOUS at 21:03

## 2019-01-03 RX ADMIN — PANTOPRAZOLE SODIUM 1 MG: 40 TABLET, DELAYED RELEASE ORAL at 06:11

## 2019-01-03 RX ADMIN — ATORVASTATIN CALCIUM SCH MG: 20 TABLET, FILM COATED ORAL at 21:02

## 2019-01-03 RX ADMIN — LORAZEPAM SCH MG: 4 INJECTION, SOLUTION INTRAMUSCULAR; INTRAVENOUS at 21:03

## 2019-01-03 RX ADMIN — PANTOPRAZOLE SODIUM SCH MG: 40 TABLET, DELAYED RELEASE ORAL at 06:11

## 2019-01-03 RX ADMIN — APIXABAN 1 MG: 5 TABLET, FILM COATED ORAL at 08:26

## 2019-01-03 RX ADMIN — DILTIAZEM HYDROCHLORIDE 1 MG: 240 CAPSULE, EXTENDED RELEASE ORAL at 08:27

## 2019-01-03 RX ADMIN — ASPIRIN 81 MG CHEWABLE TABLET 1 MG: 81 TABLET CHEWABLE at 08:27

## 2019-01-03 RX ADMIN — FUROSEMIDE 1 MG: 10 INJECTION, SOLUTION INTRAVENOUS at 18:32

## 2019-01-03 NOTE — CONS
Date/Time of Note


Date/Time of Note


DATE: 1/3/19 


TIME: 18:17





Assessment/Plan


Assessment/Plan


Hospital Course


78 yowm w/ severe AS, HCM (w/ LVOT obstruction), PAF, CAD (PCI of LAD, RCA), htn


and hld who presented w/ cp or sob.  Pt states he awoke yesterday and had chest 


pain and sob.  There was also concern for ETOH withdrawal, though pt states he 


only drinks a small amount daily.  He feels better today and states sob has 


improved. 


    Pt is well known to me from outpt clinic.  He is being evaluated for an AVR 


(possible TAVR).  Unfortunately, he also has HCM w/ LVOT obstruction, which 


complicates the treatment.  Making arrangements for a possible ETOH septal 


ablation prior to AVR (possible TAVR).  Pt was last see a few weeks ago (mid-


December), and he was stable then.





Update - pt c/o sob and nausea.  Spoke with his daughter (Beatrice) who states pt 


had a fall at home.  Asked her how much he drinks, and she says she doesn't 


know.  State he doesn't drink daily.  Pt remains in sinus rhythm.  He c/o sob.  


Does not appear to be grossly in failure, but will give a dose of lasix to see 


if it helps.


Result Diagram:  


1/2/19 0515                                                                     


          1/2/19 0515








Consultation Date/Type/Reason


Admit Date/Time


Jan 1, 2019 at 14:29


Initial Consult Date


1/2/19


Type of Consult


Cardiology





24 HR Interval Summary


Free Text/Dictation


Pt states he still does not feel good.  Has some sob earlier.  Also has nausea.





Exam/Review of Systems


Vital Signs


Vitals





Vital Signs


  Date      Temp  Pulse  Resp  B/P (MAP)   Pulse Ox  O2          O2 Flow    FiO2


Time                                                 Delivery    Rate


    1/3/19           81


     16:33


    1/3/19  98.0           20      114/68        95  Room Air


     15:52                           (83)


    1/3/19                                                             2.0


     08:37








Intake and Output





1/2/19


1/2/19


1/3/19





1515:00


23:00


07:00





IntakeIntake Total


550 ml


500 ml





BalanceBalance


550 ml


500 ml














Exam


Constitutional:  alert


Neck:  other (thick neck, difficult to assess JVP)


Respiratory:  other (decrease BS at bases)


Cardiovascular:  regular rate and rhythm, other (3/6 SKYLAR @ RUSB)


Extremities:  other (trace edema)


Neurological:  CNS II-XII intact





Medications


Medications





Current Medications


Apixaban (Eliquis) 5 mg BID PO  Last administered on 1/3/19at 08:26; Admin Dose 


5 MG;  Start 1/1/19 at 21:00


Aspirin (Aspirin) 81 mg DAILY PO  Last administered on 1/3/19at 08:27; Admin 


Dose 81 MG;  Start 1/2/19 at 09:00


Metoclopramide HCl (Reglan) 5 mg TID  PRN PO NAUSEA Last administered on 


1/3/19at 08:27; Admin Dose 5 MG;  Start 1/1/19 at 17:00


Pantoprazole (Protonix Tab) 40 mg DAILY@06 PO  Last administered on 1/3/19at 


06:11; Admin Dose 40 MG;  Start 1/2/19 at 06:00


Sucralfate (Carafate) 1 gm BID PO  Last administered on 1/3/19at 08:25; Admin 


Dose 1 GM;  Start 1/1/19 at 21:00


Diltiazem HCl (Cardizem Cd) 240 mg DAILY PO  Last administered on 1/3/19at 


08:27; Admin Dose 240 MG;  Start 1/2/19 at 09:00


Atorvastatin Calcium (Lipitor) 20 mg HS PO  Last administered on 1/2/19at 20:48;


Admin Dose 20 MG;  Start 1/1/19 at 21:00


Lorazepam (Ativan) 0.5 mg Q8 IV  Last administered on 1/3/19at 14:12; Admin Dose


0.5 MG;  Start 1/1/19 at 22:00


Simethicone (Mylicon) 80 mg Q6H  PRN PO DISTENSION/GAS/BLOATING Last 


administered on 1/3/19at 09:40; Admin Dose 80 MG;  Start 1/3/19 at 09:00











NALDO MARTIN                  Anish 3, 2019 18:21

## 2019-01-03 NOTE — PN
Date/Time of Note


Date/Time of Note


DATE: 1/3/19 


TIME: 16:09





Assessment/Plan


VTE Prophylaxis


Risk score (from Ns)>0 risk:  4


SCD applied (from Ns):  Yes


Pharmacological prophylaxis:  apixaban





Lines/Catheters


IV Catheter Type (from [facility]):  Saline Lock


Urinary Cath still in place:  No





Assessment/Plan


Assessment/Plan


A: tremor- ?etio, no further


sob


elevated bnp


severe Ao stenosis


CAD


HTN





P: cont current rx


Result Diagram:  


1/2/19 0515                                                                     


          1/2/19 0515








Subjective


24 Hr Interval Summary


Free Text/Dictation


Appreciate cardiology consult.  Pt with some sob this am mild, not nearly as bad


as 2d ago.  No cp, dizziness.  Spontaneous resolution after about an hour.  Some


indigestion/gas improved with simethicone.  No abd pain, n/v.





Exam/Review of Systems


Vital Signs


Vitals





Vital Signs


  Date      Temp  Pulse  Resp  B/P (MAP)   Pulse Ox  O2          O2 Flow    FiO2


Time                                                 Delivery    Rate


    1/3/19  98.0     85    20      114/68        95  Room Air


     15:52                           (83)


    1/3/19                                                             2.0


     08:37








Intake and Output





1/2/19


1/2/19


1/3/19





1515:00


23:00


07:00





IntakeIntake Total


550 ml


500 ml





BalanceBalance


550 ml


500 ml














Exam


gen- nad, nontoxic


lungs- CTA


heart- RRR


abd- +BS, soft, nontender


ext- no edema





Medications


Medications





Current Medications


Apixaban (Eliquis) 5 mg BID PO  Last administered on 1/3/19at 08:26; Admin Dose 


5 MG;  Start 1/1/19 at 21:00


Aspirin (Aspirin) 81 mg DAILY PO  Last administered on 1/3/19at 08:27; Admin 


Dose 81 MG;  Start 1/2/19 at 09:00


Metoclopramide HCl (Reglan) 5 mg TID  PRN PO NAUSEA Last administered on 


1/3/19at 08:27; Admin Dose 5 MG;  Start 1/1/19 at 17:00


Pantoprazole (Protonix Tab) 40 mg DAILY@06 PO  Last administered on 1/3/19at 


06:11; Admin Dose 40 MG;  Start 1/2/19 at 06:00


Sucralfate (Carafate) 1 gm BID PO  Last administered on 1/3/19at 08:25; Admin 


Dose 1 GM;  Start 1/1/19 at 21:00


Diltiazem HCl (Cardizem Cd) 240 mg DAILY PO  Last administered on 1/3/19at 08:2


7; Admin Dose 240 MG;  Start 1/2/19 at 09:00


Atorvastatin Calcium (Lipitor) 20 mg HS PO  Last administered on 1/2/19at 20:48;


Admin Dose 20 MG;  Start 1/1/19 at 21:00


Lorazepam (Ativan) 0.5 mg Q8 IV  Last administered on 1/3/19at 14:12; Admin Dose


0.5 MG;  Start 1/1/19 at 22:00


Simethicone (Mylicon) 80 mg Q6H  PRN PO DISTENSION/GAS/BLOATING Last 


administered on 1/3/19at 09:40; Admin Dose 80 MG;  Start 1/3/19 at 09:00











LISA BENNETT MD         Anish 3, 2019 16:14

## 2019-01-04 VITALS — DIASTOLIC BLOOD PRESSURE: 64 MMHG | HEART RATE: 85 BPM | RESPIRATION RATE: 20 BRPM | SYSTOLIC BLOOD PRESSURE: 122 MMHG

## 2019-01-04 VITALS — DIASTOLIC BLOOD PRESSURE: 80 MMHG | SYSTOLIC BLOOD PRESSURE: 138 MMHG | HEART RATE: 74 BPM | RESPIRATION RATE: 20 BRPM

## 2019-01-04 VITALS — HEART RATE: 89 BPM

## 2019-01-04 VITALS — HEART RATE: 86 BPM | RESPIRATION RATE: 18 BRPM | SYSTOLIC BLOOD PRESSURE: 106 MMHG | DIASTOLIC BLOOD PRESSURE: 58 MMHG

## 2019-01-04 VITALS — HEART RATE: 83 BPM

## 2019-01-04 VITALS — HEART RATE: 93 BPM | RESPIRATION RATE: 20 BRPM | DIASTOLIC BLOOD PRESSURE: 75 MMHG | SYSTOLIC BLOOD PRESSURE: 129 MMHG

## 2019-01-04 VITALS — HEART RATE: 92 BPM | DIASTOLIC BLOOD PRESSURE: 72 MMHG | RESPIRATION RATE: 20 BRPM | SYSTOLIC BLOOD PRESSURE: 128 MMHG

## 2019-01-04 VITALS — DIASTOLIC BLOOD PRESSURE: 72 MMHG | SYSTOLIC BLOOD PRESSURE: 116 MMHG | HEART RATE: 90 BPM | RESPIRATION RATE: 20 BRPM

## 2019-01-04 VITALS — HEART RATE: 86 BPM

## 2019-01-04 VITALS — HEART RATE: 101 BPM

## 2019-01-04 VITALS — HEART RATE: 90 BPM

## 2019-01-04 VITALS — HEART RATE: 87 BPM

## 2019-01-04 LAB
ABNORMAL IP MESSAGE: 1
ADD MAN DIFF?: NO
ALANINE AMINOTRANSFERASE: 23 IU/L (ref 13–69)
ALBUMIN/GLOBULIN RATIO: 0.9
ALBUMIN: 3.6 G/DL (ref 3.3–4.9)
ALKALINE PHOSPHATASE: 72 IU/L (ref 42–121)
ANION GAP: 6 (ref 5–13)
ASPARTATE AMINO TRANSFERASE: 48 IU/L (ref 15–46)
BASOPHIL #: 0 10^3/UL (ref 0–0.1)
BASOPHILS %: 0.3 % (ref 0–2)
BILIRUBIN,DIRECT: 0 MG/DL (ref 0–0.2)
BILIRUBIN,TOTAL: 1.8 MG/DL (ref 0.2–1.3)
BLOOD UREA NITROGEN: 12 MG/DL (ref 7–20)
CALCIUM: 8.7 MG/DL (ref 8.4–10.2)
CARBON DIOXIDE: 30 MMOL/L (ref 21–31)
CHLORIDE: 103 MMOL/L (ref 97–110)
CREATININE: 0.74 MG/DL (ref 0.61–1.24)
EOSINOPHILS #: 0 10^3/UL (ref 0–0.5)
EOSINOPHILS %: 0 % (ref 0–7)
GLOBULIN: 4 G/DL (ref 1.3–3.2)
GLUCOSE: 93 MG/DL (ref 70–220)
HEMATOCRIT: 38.7 % (ref 42–52)
HEMOGLOBIN: 12.6 G/DL (ref 14–18)
IMMATURE GRANS #M: 0.01 10^3/UL (ref 0–0.03)
IMMATURE GRANS % (M): 0.3 % (ref 0–0.43)
LYMPHOCYTES #: 0.4 10^3/UL (ref 0.8–2.9)
LYMPHOCYTES %: 9.8 % (ref 15–51)
MEAN CORPUSCULAR HEMOGLOBIN: 29 PG (ref 29–33)
MEAN CORPUSCULAR HGB CONC: 32.6 G/DL (ref 32–37)
MEAN CORPUSCULAR VOLUME: 89 FL (ref 82–101)
MEAN PLATELET VOLUME: 11.6 FL (ref 7.4–10.4)
MONOCYTE #: 0.6 10^3/UL (ref 0.3–0.9)
MONOCYTES %: 15.2 % (ref 0–11)
NEUTROPHIL #: 2.9 10^3/UL (ref 1.6–7.5)
NEUTROPHILS %: 74.4 % (ref 39–77)
NUCLEATED RED BLOOD CELLS #: 0 10^3/UL (ref 0–0)
NUCLEATED RED BLOOD CELLS%: 0 /100WBC (ref 0–0)
PLATELET COUNT: 106 10^3/UL (ref 140–415)
POSITIVE DIFF: (no result)
POTASSIUM: 3.6 MMOL/L (ref 3.5–5.1)
RED BLOOD COUNT: 4.35 10^6/UL (ref 4.7–6.1)
RED CELL DISTRIBUTION WIDTH: 16.1 % (ref 11.5–14.5)
SODIUM: 139 MMOL/L (ref 135–144)
TOTAL PROTEIN: 7.6 G/DL (ref 6.1–8.1)
WHITE BLOOD COUNT: 3.9 10^3/UL (ref 4.8–10.8)

## 2019-01-04 RX ADMIN — SUCRALFATE 1 GM: 1 TABLET ORAL at 21:15

## 2019-01-04 RX ADMIN — ASPIRIN 81 MG CHEWABLE TABLET 1 MG: 81 TABLET CHEWABLE at 08:24

## 2019-01-04 RX ADMIN — LORAZEPAM SCH MG: 4 INJECTION, SOLUTION INTRAMUSCULAR; INTRAVENOUS at 06:49

## 2019-01-04 RX ADMIN — SUCRALFATE SCH GM: 1 TABLET ORAL at 21:15

## 2019-01-04 RX ADMIN — LORAZEPAM SCH MG: 4 INJECTION, SOLUTION INTRAMUSCULAR; INTRAVENOUS at 14:06

## 2019-01-04 RX ADMIN — APIXABAN SCH MG: 5 TABLET, FILM COATED ORAL at 21:15

## 2019-01-04 RX ADMIN — SUCRALFATE SCH GM: 1 TABLET ORAL at 08:24

## 2019-01-04 RX ADMIN — PANTOPRAZOLE SODIUM SCH MG: 40 TABLET, DELAYED RELEASE ORAL at 06:49

## 2019-01-04 RX ADMIN — SUCRALFATE 1 GM: 1 TABLET ORAL at 08:24

## 2019-01-04 RX ADMIN — ATORVASTATIN CALCIUM SCH MG: 20 TABLET, FILM COATED ORAL at 21:15

## 2019-01-04 RX ADMIN — LORAZEPAM 1 MG: 4 INJECTION, SOLUTION INTRAMUSCULAR; INTRAVENOUS at 14:06

## 2019-01-04 RX ADMIN — LORAZEPAM 1 MG: 4 INJECTION, SOLUTION INTRAMUSCULAR; INTRAVENOUS at 06:49

## 2019-01-04 RX ADMIN — APIXABAN SCH MG: 5 TABLET, FILM COATED ORAL at 08:24

## 2019-01-04 RX ADMIN — PANTOPRAZOLE SODIUM 1 MG: 40 TABLET, DELAYED RELEASE ORAL at 06:49

## 2019-01-04 RX ADMIN — DILTIAZEM HYDROCHLORIDE SCH MG: 240 CAPSULE, EXTENDED RELEASE ORAL at 08:25

## 2019-01-04 RX ADMIN — FUROSEMIDE 1 MG: 10 INJECTION, SOLUTION INTRAVENOUS at 16:24

## 2019-01-04 RX ADMIN — ATORVASTATIN CALCIUM 1 MG: 20 TABLET, FILM COATED ORAL at 21:15

## 2019-01-04 RX ADMIN — APIXABAN 1 MG: 5 TABLET, FILM COATED ORAL at 08:24

## 2019-01-04 RX ADMIN — LORAZEPAM 1 MG: 4 INJECTION, SOLUTION INTRAMUSCULAR; INTRAVENOUS at 21:15

## 2019-01-04 RX ADMIN — DILTIAZEM HYDROCHLORIDE 1 MG: 240 CAPSULE, EXTENDED RELEASE ORAL at 08:25

## 2019-01-04 RX ADMIN — APIXABAN 1 MG: 5 TABLET, FILM COATED ORAL at 21:15

## 2019-01-04 RX ADMIN — ASPIRIN 81 MG SCH MG: 81 TABLET ORAL at 08:24

## 2019-01-04 RX ADMIN — LORAZEPAM SCH MG: 4 INJECTION, SOLUTION INTRAMUSCULAR; INTRAVENOUS at 21:15

## 2019-01-04 NOTE — PN
Date/Time of Note


Date/Time of Note


DATE: 1/4/19 


TIME: 13:44





Assessment/Plan


VTE Prophylaxis


Risk score (from Ns)>0 risk:  4


SCD applied (from Ns):  Yes


Pharmacological prophylaxis:  apixaban





Lines/Catheters


IV Catheter Type (from [facility]):  Saline Lock


Urinary Cath still in place:  No





Assessment/Plan


Assessment/Plan


A: sob improved after lasix


elevated bnp


tremor


severe Ao stenosis


HTN


CAD





P: cont current rx


dispo as per cardiology


Result Diagram:  


1/4/19 0533                                                                     


          1/4/19 0533





Results 24hrs





Laboratory Tests


               Test
                                1/4/19
05:33


               White Blood Count                          3.9  L


               Red Blood Count                           4.35  L


               Hemoglobin                                12.6  L


               Hematocrit                                38.7  L


               Mean Corpuscular Volume                    89.0


               Mean Corpuscular Hemoglobin                29.0


               Mean Corpuscular Hemoglobin
Concent       32.6  



               Red Cell Distribution Width               16.1  H


               Platelet Count                             106  L


               Mean Platelet Volume                      11.6  H


               Immature Granulocytes %                   0.300


               Neutrophils %                              74.4


               Lymphocytes %                              9.8  L


               Monocytes %                               15.2  H


               Eosinophils %                               0.0


               Basophils %                                 0.3


               Nucleated Red Blood Cells %                 0.0


               Immature Granulocytes #                   0.010


               Neutrophils #                               2.9


               Lymphocytes #                              0.4  L


               Monocytes #                                 0.6


               Eosinophils #                               0.0


               Basophils #                                 0.0


               Nucleated Red Blood Cells #                 0.0


               Sodium Level                                139


               Potassium Level                             3.6


               Chloride Level                              103


               Carbon Dioxide Level                         30


               Anion Gap                                     6


               Blood Urea Nitrogen                          12


               Creatinine                                 0.74


               Est Glomerular Filtrat Rate
mL/min     



               Glucose Level                                93


               Calcium Level                               8.7


               Total Bilirubin                            1.8  H


               Direct Bilirubin                           0.00


               Indirect Bilirubin                         1.8  H


               Aspartate Amino Transf
(AST/SGOT)          48  H



               Alanine Aminotransferase
(ALT/SGPT)         23  



               Alkaline Phosphatase                         72


               Total Protein                               7.6


               Albumin                                     3.6


               Globulin                                  4.00  H


               Albumin/Globulin Ratio                     0.90








Subjective


24 Hr Interval Summary


Free Text/Dictation


Pt given some lasix yesterday, feeling pretty good today.  No sob at rest, mild 


when walking.  No cp.  Passing flatus with some relief of abd sx.





Exam/Review of Systems


Vital Signs


Vitals





Vital Signs


  Date      Temp  Pulse  Resp  B/P (MAP)   Pulse Ox  O2          O2 Flow    FiO2


Time                                                 Delivery    Rate


    1/4/19           86


     12:57


    1/4/19  98.6           20      116/72        95  Room Air


     11:05                           (87)


    1/3/19                                                             2.0


     08:37








Intake and Output





1/3/19


1/3/19


1/4/19





1515:00


23:00


07:00





IntakeIntake Total


900 ml


600 ml





BalanceBalance


900 ml


600 ml














Exam


gen- nad, nontoxic


lungs- CTA


heart- RRR


abd- +BS, soft, nontender


ext- no edema





Medications


Medications





Current Medications


Apixaban (Eliquis) 5 mg BID PO  Last administered on 1/4/19at 08:24; Admin Dose 


5 MG;  Start 1/1/19 at 21:00


Aspirin (Aspirin) 81 mg DAILY PO  Last administered on 1/4/19at 08:24; Admin 


Dose 81 MG;  Start 1/2/19 at 09:00


Metoclopramide HCl (Reglan) 5 mg TID  PRN PO NAUSEA Last administered on 


1/3/19at 08:27; Admin Dose 5 MG;  Start 1/1/19 at 17:00


Pantoprazole (Protonix Tab) 40 mg DAILY@06 PO  Last administered on 1/4/19at 


06:49; Admin Dose 40 MG;  Start 1/2/19 at 06:00


Sucralfate (Carafate) 1 gm BID PO  Last administered on 1/4/19at 08:24; Admin 


Dose 1 GM;  Start 1/1/19 at 21:00


Diltiazem HCl (Cardizem Cd) 240 mg DAILY PO  Last administered on 1/4/19at 


08:25; Admin Dose 240 MG;  Start 1/2/19 at 09:00


Atorvastatin Calcium (Lipitor) 20 mg HS PO  Last administered on 1/3/19at 21:02;


Admin Dose 20 MG;  Start 1/1/19 at 21:00


Lorazepam (Ativan) 0.5 mg Q8 IV  Last administered on 1/4/19at 06:49; Admin Dose


0.5 MG;  Start 1/1/19 at 22:00


Simethicone (Mylicon) 80 mg Q6H  PRN PO DISTENSION/GAS/BLOATING Last 


administered on 1/4/19at 08:24; Admin Dose 80 MG;  Start 1/3/19 at 09:00











LISA BENNETT MD         Jan 4, 2019 13:48

## 2019-01-04 NOTE — CONS
Date/Time of Note


Date/Time of Note


DATE: 1/4/19 


TIME: 16:00





Assessment/Plan


Assessment/Plan


Hospital Course


78 yowm w/ severe AS, HCM (w/ LVOT obstruction), PAF, CAD (PCI of LAD, RCA), htn


and hld who presented w/ cp or sob.  Pt states he awoke yesterday and had chest 


pain and sob.  There was also concern for ETOH withdrawal, though pt states he 


only drinks a small amount daily.  He feels better today and states sob has 


improved. 


    Pt is well known to me from outpt clinic.  He is being evaluated for an AVR 


(possible TAVR).  Unfortunately, he also has HCM w/ LVOT obstruction, which 


complicates the treatment.  Making arrangements for a possible ETOH septal 


ablation prior to AVR (possible TAVR).  Pt was last see a few weeks ago (mid-


December), and he was stable then.





Update - pt c/o sob and nausea.  Spoke with his daughter (Beatrice) who states pt 


had a fall at home.  Asked her how much he drinks, and she says she doesn't 


know.  State he doesn't drink daily.  Pt remains in sinus rhythm.  He c/o sob.  


Does not appear to be grossly in failure, but will give a dose of lasix to see 


if it helps.   





Update 1/3/19 - Pt improved symptomatically with lasix.  Still not at baseline. 


Will give another dose of IV lasix today.  Hopefully discharge tomorrow.


Result Diagram:  


1/4/19 0533                                                                     


          1/4/19 0533





Results 24hrs





Laboratory Tests


               Test
                                1/4/19
05:33


               White Blood Count                          3.9  L


               Red Blood Count                           4.35  L


               Hemoglobin                                12.6  L


               Hematocrit                                38.7  L


               Mean Corpuscular Volume                    89.0


               Mean Corpuscular Hemoglobin                29.0


               Mean Corpuscular Hemoglobin
Concent       32.6  



               Red Cell Distribution Width               16.1  H


               Platelet Count                             106  L


               Mean Platelet Volume                      11.6  H


               Immature Granulocytes %                   0.300


               Neutrophils %                              74.4


               Lymphocytes %                              9.8  L


               Monocytes %                               15.2  H


               Eosinophils %                               0.0


               Basophils %                                 0.3


               Nucleated Red Blood Cells %                 0.0


               Immature Granulocytes #                   0.010


               Neutrophils #                               2.9


               Lymphocytes #                              0.4  L


               Monocytes #                                 0.6


               Eosinophils #                               0.0


               Basophils #                                 0.0


               Nucleated Red Blood Cells #                 0.0


               Sodium Level                                139


               Potassium Level                             3.6


               Chloride Level                              103


               Carbon Dioxide Level                         30


               Anion Gap                                     6


               Blood Urea Nitrogen                          12


               Creatinine                                 0.74


               Est Glomerular Filtrat Rate
mL/min     



               Glucose Level                                93


               Calcium Level                               8.7


               Total Bilirubin                            1.8  H


               Direct Bilirubin                           0.00


               Indirect Bilirubin                         1.8  H


               Aspartate Amino Transf
(AST/SGOT)          48  H



               Alanine Aminotransferase
(ALT/SGPT)         23  



               Alkaline Phosphatase                         72


               Total Protein                               7.6


               Albumin                                     3.6


               Globulin                                  4.00  H


               Albumin/Globulin Ratio                     0.90








Consultation Date/Type/Reason


Admit Date/Time


Jan 1, 2019 at 14:29


Initial Consult Date


1/2/19


Type of Consult


Cardiology





24 HR Interval Summary


Free Text/Dictation


Feels better.  Less sob. Abd pain better.  Still not at baseline.





Exam/Review of Systems


Vital Signs


Vitals





Vital Signs


  Date      Temp  Pulse  Resp  B/P (MAP)   Pulse Ox  O2          O2 Flow    FiO2


Time                                                 Delivery    Rate


    1/4/19  97.6     85    20      122/64        95  Room Air


     15:27                           (83)


    1/3/19                                                             2.0


     08:37








Intake and Output





1/3/19


1/3/19


1/4/19





1515:00


23:00


07:00





IntakeIntake Total


900 ml


600 ml





BalanceBalance


900 ml


600 ml














Exam


Constitutional:  alert


Neck:  other (thick, difficult to asses JVP)


Respiratory:  other (decreased BS at bases)


Cardiovascular:  regular rate and rhythm, other (3/6 SKYLAR)


Extremities:  other (trace edema)


Neurological:  nl mental status





Medications


Medications





Current Medications


Apixaban (Eliquis) 5 mg BID PO  Last administered on 1/4/19at 08:24; Admin Dose 


5 MG;  Start 1/1/19 at 21:00


Aspirin (Aspirin) 81 mg DAILY PO  Last administered on 1/4/19at 08:24; Admin 


Dose 81 MG;  Start 1/2/19 at 09:00


Metoclopramide HCl (Reglan) 5 mg TID  PRN PO NAUSEA Last administered on 


1/3/19at 08:27; Admin Dose 5 MG;  Start 1/1/19 at 17:00


Pantoprazole (Protonix Tab) 40 mg DAILY@06 PO  Last administered on 1/4/19at 


06:49; Admin Dose 40 MG;  Start 1/2/19 at 06:00


Sucralfate (Carafate) 1 gm BID PO  Last administered on 1/4/19at 08:24; Admin 


Dose 1 GM;  Start 1/1/19 at 21:00


Diltiazem HCl (Cardizem Cd) 240 mg DAILY PO  Last administered on 1/4/19at 


08:25; Admin Dose 240 MG;  Start 1/2/19 at 09:00


Atorvastatin Calcium (Lipitor) 20 mg HS PO  Last administered on 1/3/19at 21:02;


Admin Dose 20 MG;  Start 1/1/19 at 21:00


Lorazepam (Ativan) 0.5 mg Q8 IV  Last administered on 1/4/19at 14:06; Admin Dose


0.5 MG;  Start 1/1/19 at 22:00


Simethicone (Mylicon) 80 mg Q6H  PRN PO DISTENSION/GAS/BLOATING Last 


administered on 1/4/19at 08:24; Admin Dose 80 MG;  Start 1/3/19 at 09:00











NALDO MARTIN                  Jan 4, 2019 16:02

## 2019-01-05 VITALS — HEART RATE: 88 BPM

## 2019-01-05 VITALS — DIASTOLIC BLOOD PRESSURE: 73 MMHG | RESPIRATION RATE: 19 BRPM | SYSTOLIC BLOOD PRESSURE: 126 MMHG | HEART RATE: 91 BPM

## 2019-01-05 VITALS — SYSTOLIC BLOOD PRESSURE: 115 MMHG | RESPIRATION RATE: 19 BRPM | HEART RATE: 81 BPM | DIASTOLIC BLOOD PRESSURE: 65 MMHG

## 2019-01-05 VITALS — SYSTOLIC BLOOD PRESSURE: 124 MMHG | RESPIRATION RATE: 20 BRPM | HEART RATE: 92 BPM | DIASTOLIC BLOOD PRESSURE: 77 MMHG

## 2019-01-05 VITALS — HEART RATE: 82 BPM

## 2019-01-05 VITALS — RESPIRATION RATE: 20 BRPM | DIASTOLIC BLOOD PRESSURE: 70 MMHG | HEART RATE: 97 BPM | SYSTOLIC BLOOD PRESSURE: 118 MMHG

## 2019-01-05 VITALS — DIASTOLIC BLOOD PRESSURE: 73 MMHG | SYSTOLIC BLOOD PRESSURE: 117 MMHG | RESPIRATION RATE: 18 BRPM | HEART RATE: 94 BPM

## 2019-01-05 VITALS — SYSTOLIC BLOOD PRESSURE: 128 MMHG | RESPIRATION RATE: 20 BRPM | DIASTOLIC BLOOD PRESSURE: 70 MMHG | HEART RATE: 89 BPM

## 2019-01-05 VITALS — HEART RATE: 87 BPM

## 2019-01-05 VITALS — HEART RATE: 96 BPM

## 2019-01-05 VITALS — HEART RATE: 90 BPM

## 2019-01-05 LAB
ANION GAP: 11 (ref 5–13)
BLOOD UREA NITROGEN: 15 MG/DL (ref 7–20)
CALCIUM: 9.2 MG/DL (ref 8.4–10.2)
CARBON DIOXIDE: 24 MMOL/L (ref 21–31)
CHLORIDE: 103 MMOL/L (ref 97–110)
CREATININE: 0.62 MG/DL (ref 0.61–1.24)
GLUCOSE: 135 MG/DL (ref 70–220)
POTASSIUM: 3.7 MMOL/L (ref 3.5–5.1)
SODIUM: 138 MMOL/L (ref 135–144)

## 2019-01-05 RX ADMIN — LORAZEPAM 1 MG: 4 INJECTION, SOLUTION INTRAMUSCULAR; INTRAVENOUS at 21:59

## 2019-01-05 RX ADMIN — SUCRALFATE 1 GM: 1 TABLET ORAL at 20:09

## 2019-01-05 RX ADMIN — DILTIAZEM HYDROCHLORIDE 1 MG: 240 CAPSULE, EXTENDED RELEASE ORAL at 08:38

## 2019-01-05 RX ADMIN — LORAZEPAM SCH MG: 4 INJECTION, SOLUTION INTRAMUSCULAR; INTRAVENOUS at 15:16

## 2019-01-05 RX ADMIN — LORAZEPAM 1 MG: 4 INJECTION, SOLUTION INTRAMUSCULAR; INTRAVENOUS at 05:29

## 2019-01-05 RX ADMIN — LORAZEPAM 1 MG: 4 INJECTION, SOLUTION INTRAMUSCULAR; INTRAVENOUS at 15:16

## 2019-01-05 RX ADMIN — ASPIRIN 81 MG CHEWABLE TABLET 1 MG: 81 TABLET CHEWABLE at 08:38

## 2019-01-05 RX ADMIN — APIXABAN 1 MG: 5 TABLET, FILM COATED ORAL at 08:38

## 2019-01-05 RX ADMIN — SUCRALFATE SCH GM: 1 TABLET ORAL at 08:38

## 2019-01-05 RX ADMIN — LORAZEPAM SCH MG: 4 INJECTION, SOLUTION INTRAMUSCULAR; INTRAVENOUS at 21:59

## 2019-01-05 RX ADMIN — ATORVASTATIN CALCIUM SCH MG: 20 TABLET, FILM COATED ORAL at 20:10

## 2019-01-05 RX ADMIN — PANTOPRAZOLE SODIUM SCH MG: 40 TABLET, DELAYED RELEASE ORAL at 05:29

## 2019-01-05 RX ADMIN — LORAZEPAM SCH MG: 4 INJECTION, SOLUTION INTRAMUSCULAR; INTRAVENOUS at 05:29

## 2019-01-05 RX ADMIN — ASPIRIN 81 MG SCH MG: 81 TABLET ORAL at 08:38

## 2019-01-05 RX ADMIN — APIXABAN SCH MG: 5 TABLET, FILM COATED ORAL at 20:10

## 2019-01-05 RX ADMIN — FUROSEMIDE 1 MG: 10 INJECTION, SOLUTION INTRAVENOUS at 12:48

## 2019-01-05 RX ADMIN — DILTIAZEM HYDROCHLORIDE SCH MG: 240 CAPSULE, EXTENDED RELEASE ORAL at 08:38

## 2019-01-05 RX ADMIN — PANTOPRAZOLE SODIUM 1 MG: 40 TABLET, DELAYED RELEASE ORAL at 05:29

## 2019-01-05 RX ADMIN — ATORVASTATIN CALCIUM 1 MG: 20 TABLET, FILM COATED ORAL at 20:10

## 2019-01-05 RX ADMIN — SUCRALFATE 1 GM: 1 TABLET ORAL at 08:38

## 2019-01-05 RX ADMIN — APIXABAN SCH MG: 5 TABLET, FILM COATED ORAL at 08:38

## 2019-01-05 RX ADMIN — SUCRALFATE SCH GM: 1 TABLET ORAL at 20:09

## 2019-01-05 RX ADMIN — APIXABAN 1 MG: 5 TABLET, FILM COATED ORAL at 20:10

## 2019-01-05 NOTE — CONS
Date/Time of Note


Date/Time of Note


DATE: 1/5/19 


TIME: 12:13





Assessment/Plan


Assessment/Plan


Hospital Course


78 yowm w/ severe AS, HCM (w/ LVOT obstruction), PAF, CAD (PCI of LAD, RCA), htn


and hld who presented w/ cp or sob.  Pt states he awoke yesterday and had chest 


pain and sob.  There was also concern for ETOH withdrawal, though pt states he 


only drinks a small amount daily.  He feels better today and states sob has 


improved. 


    Pt is well known to me from outpt clinic.  He is being evaluated for an AVR 


(possible TAVR).  Unfortunately, he also has HCM w/ LVOT obstruction, which 


complicates the treatment.  Making arrangements for a possible ETOH septal 


ablation prior to AVR (possible TAVR).  Pt was last see a few weeks ago (mid-


December), and he was stable then.





Update - pt c/o sob and nausea.  Spoke with his daughter (Beatrice) who states pt 


had a fall at home.  Asked her how much he drinks, and she says she doesn't 


know.  State he doesn't drink daily.  Pt remains in sinus rhythm.  He c/o sob.  


Does not appear to be grossly in failure, but will give a dose of lasix to see 


if it helps.   





Update 1/5/19 - Feels better.  Denies sob at rest.  Will give another dose of IV


lasix today.  If feeling better by later in the afternoon, may go home.  (Check 


lytes today and replete K if needed).


Result Diagram:  


1/4/19 0533                                                                     


          1/4/19 0533








Consultation Date/Type/Reason


Admit Date/Time


Jan 1, 2019 at 14:29


Initial Consult Date


1/2/19


Type of Consult


Cardiology





24 HR Interval Summary


Free Text/Dictation


Pt feeling better.  Breathing is better.  No longer having abdominal pain.





Exam/Review of Systems


Vital Signs


Vitals





Vital Signs


  Date      Temp  Pulse  Resp  B/P (MAP)   Pulse Ox  O2          O2 Flow    FiO2


Time                                                 Delivery    Rate


    1/5/19  98.0     94    18      117/73        95  Room Air


     11:17                           (88)


    1/3/19                                                             2.0


     08:37








Intake and Output





1/4/19 1/4/19 1/5/19





1515:00


23:00


07:00





IntakeIntake Total


800 ml


700 ml





BalanceBalance


800 ml


700 ml














Exam


Constitutional:  alert


Neck:  other (thick neck, difficult to assess JVP)


Respiratory:  other (mostly clear b/l)


Cardiovascular:  regular rate and rhythm, other (2/6 SKYLAR)


Extremities:  other (no edema)





Medications


Medications





Current Medications


Apixaban (Eliquis) 5 mg BID PO  Last administered on 1/5/19at 08:38; Admin Dose 


5 MG;  Start 1/1/19 at 21:00


Aspirin (Aspirin) 81 mg DAILY PO  Last administered on 1/5/19at 08:38; Admin 


Dose 81 MG;  Start 1/2/19 at 09:00


Metoclopramide HCl (Reglan) 5 mg TID  PRN PO NAUSEA Last administered on 


1/3/19at 08:27; Admin Dose 5 MG;  Start 1/1/19 at 17:00


Pantoprazole (Protonix Tab) 40 mg DAILY@06 PO  Last administered on 1/5/19at 


05:29; Admin Dose 40 MG;  Start 1/2/19 at 06:00


Sucralfate (Carafate) 1 gm BID PO  Last administered on 1/5/19at 08:38; Admin 


Dose 1 GM;  Start 1/1/19 at 21:00


Diltiazem HCl (Cardizem Cd) 240 mg DAILY PO  Last administered on 1/5/19at 


08:38; Admin Dose 240 MG;  Start 1/2/19 at 09:00


Atorvastatin Calcium (Lipitor) 20 mg HS PO  Last administered on 1/4/19at 21:15;


Admin Dose 20 MG;  Start 1/1/19 at 21:00


Lorazepam (Ativan) 0.5 mg Q8 IV  Last administered on 1/5/19at 05:29; Admin Dose


0.5 MG;  Start 1/1/19 at 22:00


Simethicone (Mylicon) 80 mg Q6H  PRN PO DISTENSION/GAS/BLOATING Last 


administered on 1/4/19at 08:24; Admin Dose 80 MG;  Start 1/3/19 at 09:00


Furosemide (Lasix) 20 mg ONCE  ONCE IV ;  Start 1/5/19 at 12:30;  Stop 1/5/19 at


12:31











NALDO MARTIN                  Jan 5, 2019 12:19

## 2019-01-05 NOTE — PN
Date/Time of Note


Date/Time of Note


DATE: 1/5/19 


TIME: 13:39





Assessment/Plan


VTE Prophylaxis


Risk score (from Ns)>0 risk:  5


[place] [facility] [place]  [place]


SCD contraindicated:  other


Pharmacological prophylaxis:  apixaban





Lines/Catheters


IV Catheter Type (from [facility]):  Saline Lock


Urinary Cath still in place:  No





Assessment/Plan


Assessment/Plan


A: sob- improving, not at baseline yet


elevated bnp


tremor


severe Aortic stenosis


HTN


CAD





P: diuresis as per cardiology


cont current rx


monitor labs


Result Diagram:  


1/4/19 0533 1/4/19 0533








Subjective


24 Hr Interval Summary


Free Text/Dictation


Breathing a bit better, no dyspnea with walking to bathroom.  Not feeling great 


right now, did not eat much lunch, wasn't very appetizing.  No cp.





Exam/Review of Systems


Vital Signs


Vitals





Vital Signs


  Date      Temp  Pulse  Resp  B/P (MAP)   Pulse Ox  O2          O2 Flow    FiO2


Time                                                 Delivery    Rate


    1/5/19  98.0     94    18      117/73        95  Room Air


     11:17                           (88)


    1/3/19                                                             2.0


     08:37








Intake and Output





1/4/19 1/4/19 1/5/19





1515:00


23:00


07:00





IntakeIntake Total


800 ml


700 ml





BalanceBalance


800 ml


700 ml














Exam


gen- nad, nontoxic


lungs- CTA


heart- RRR


ext- no edema





Medications


Medications





Current Medications


Apixaban (Eliquis) 5 mg BID PO  Last administered on 1/5/19at 08:38; Admin Dose 


5 MG;  Start 1/1/19 at 21:00


Aspirin (Aspirin) 81 mg DAILY PO  Last administered on 1/5/19at 08:38; Admin 


Dose 81 MG;  Start 1/2/19 at 09:00


Metoclopramide HCl (Reglan) 5 mg TID  PRN PO NAUSEA Last administered on 


1/3/19at 08:27; Admin Dose 5 MG;  Start 1/1/19 at 17:00


Pantoprazole (Protonix Tab) 40 mg DAILY@06 PO  Last administered on 1/5/19at 


05:29; Admin Dose 40 MG;  Start 1/2/19 at 06:00


Sucralfate (Carafate) 1 gm BID PO  Last administered on 1/5/19at 08:38; Admin 


Dose 1 GM;  Start 1/1/19 at 21:00


Diltiazem HCl (Cardizem Cd) 240 mg DAILY PO  Last administered on 1/5/19at 08


:38; Admin Dose 240 MG;  Start 1/2/19 at 09:00


Atorvastatin Calcium (Lipitor) 20 mg HS PO  Last administered on 1/4/19at 21:15;


Admin Dose 20 MG;  Start 1/1/19 at 21:00


Lorazepam (Ativan) 0.5 mg Q8 IV  Last administered on 1/5/19at 05:29; Admin Dose


0.5 MG;  Start 1/1/19 at 22:00


Simethicone (Mylicon) 80 mg Q6H  PRN PO DISTENSION/GAS/BLOATING Last 


administered on 1/4/19at 08:24; Admin Dose 80 MG;  Start 1/3/19 at 09:00











LISA BENNETT MD         Jan 5, 2019 13:46

## 2019-01-06 VITALS — HEART RATE: 81 BPM

## 2019-01-06 VITALS — RESPIRATION RATE: 16 BRPM | DIASTOLIC BLOOD PRESSURE: 72 MMHG | SYSTOLIC BLOOD PRESSURE: 123 MMHG | HEART RATE: 87 BPM

## 2019-01-06 VITALS — RESPIRATION RATE: 18 BRPM | DIASTOLIC BLOOD PRESSURE: 66 MMHG | HEART RATE: 88 BPM | SYSTOLIC BLOOD PRESSURE: 125 MMHG

## 2019-01-06 VITALS — HEART RATE: 84 BPM

## 2019-01-06 VITALS — RESPIRATION RATE: 16 BRPM | SYSTOLIC BLOOD PRESSURE: 106 MMHG | DIASTOLIC BLOOD PRESSURE: 65 MMHG | HEART RATE: 84 BPM

## 2019-01-06 VITALS — DIASTOLIC BLOOD PRESSURE: 67 MMHG | SYSTOLIC BLOOD PRESSURE: 118 MMHG | HEART RATE: 85 BPM | RESPIRATION RATE: 20 BRPM

## 2019-01-06 VITALS — HEART RATE: 95 BPM

## 2019-01-06 VITALS — HEART RATE: 79 BPM

## 2019-01-06 LAB
ABNORMAL IP MESSAGE: 1
ADD MAN DIFF?: NO
ANION GAP: 8 (ref 5–13)
BASOPHIL #: 0 10^3/UL (ref 0–0.1)
BASOPHILS %: 0.6 % (ref 0–2)
BLOOD UREA NITROGEN: 16 MG/DL (ref 7–20)
CALCIUM: 9 MG/DL (ref 8.4–10.2)
CARBON DIOXIDE: 27 MMOL/L (ref 21–31)
CHLORIDE: 104 MMOL/L (ref 97–110)
CREATININE: 0.62 MG/DL (ref 0.61–1.24)
EOSINOPHILS #: 0 10^3/UL (ref 0–0.5)
EOSINOPHILS %: 0.3 % (ref 0–7)
GLUCOSE: 94 MG/DL (ref 70–220)
HEMATOCRIT: 39.9 % (ref 42–52)
HEMOGLOBIN: 13.1 G/DL (ref 14–18)
IMMATURE GRANS #M: 0.01 10^3/UL (ref 0–0.03)
IMMATURE GRANS % (M): 0.3 % (ref 0–0.43)
LYMPHOCYTES #: 0.3 10^3/UL (ref 0.8–2.9)
LYMPHOCYTES %: 9.8 % (ref 15–51)
MAGNESIUM: 2.2 MG/DL (ref 1.7–2.5)
MEAN CORPUSCULAR HEMOGLOBIN: 29.4 PG (ref 29–33)
MEAN CORPUSCULAR HGB CONC: 32.8 G/DL (ref 32–37)
MEAN CORPUSCULAR VOLUME: 89.5 FL (ref 82–101)
MEAN PLATELET VOLUME: 11.1 FL (ref 7.4–10.4)
MONOCYTE #: 0.6 10^3/UL (ref 0.3–0.9)
MONOCYTES %: 17.1 % (ref 0–11)
NEUTROPHIL #: 2.4 10^3/UL (ref 1.6–7.5)
NEUTROPHILS %: 71.9 % (ref 39–77)
NUCLEATED RED BLOOD CELLS #: 0 10^3/UL (ref 0–0)
NUCLEATED RED BLOOD CELLS%: 0 /100WBC (ref 0–0)
PLATELET COUNT: 129 10^3/UL (ref 140–415)
POSITIVE DIFF: (no result)
POTASSIUM: 3.3 MMOL/L (ref 3.5–5.1)
RED BLOOD COUNT: 4.46 10^6/UL (ref 4.7–6.1)
RED CELL DISTRIBUTION WIDTH: 16.2 % (ref 11.5–14.5)
SODIUM: 139 MMOL/L (ref 135–144)
WHITE BLOOD COUNT: 3.3 10^3/UL (ref 4.8–10.8)

## 2019-01-06 RX ADMIN — DILTIAZEM HYDROCHLORIDE SCH MG: 240 CAPSULE, EXTENDED RELEASE ORAL at 07:33

## 2019-01-06 RX ADMIN — APIXABAN 1 MG: 5 TABLET, FILM COATED ORAL at 07:34

## 2019-01-06 RX ADMIN — LORAZEPAM 1 MG: 4 INJECTION, SOLUTION INTRAMUSCULAR; INTRAVENOUS at 05:48

## 2019-01-06 RX ADMIN — PANTOPRAZOLE SODIUM 1 MG: 40 TABLET, DELAYED RELEASE ORAL at 05:47

## 2019-01-06 RX ADMIN — SUCRALFATE 1 GM: 1 TABLET ORAL at 07:33

## 2019-01-06 RX ADMIN — ASPIRIN 81 MG CHEWABLE TABLET 1 MG: 81 TABLET CHEWABLE at 07:33

## 2019-01-06 RX ADMIN — PANTOPRAZOLE SODIUM SCH MG: 40 TABLET, DELAYED RELEASE ORAL at 05:47

## 2019-01-06 RX ADMIN — LORAZEPAM SCH MG: 4 INJECTION, SOLUTION INTRAMUSCULAR; INTRAVENOUS at 14:00

## 2019-01-06 RX ADMIN — FUROSEMIDE 1 MG: 10 INJECTION, SOLUTION INTRAVENOUS at 12:20

## 2019-01-06 RX ADMIN — POTASSIUM CHLORIDE 1 MEQ: 1500 TABLET, EXTENDED RELEASE ORAL at 14:10

## 2019-01-06 RX ADMIN — POTASSIUM CHLORIDE 1 MEQ: 1500 TABLET, EXTENDED RELEASE ORAL at 12:19

## 2019-01-06 RX ADMIN — LORAZEPAM 1 MG: 4 INJECTION, SOLUTION INTRAMUSCULAR; INTRAVENOUS at 14:00

## 2019-01-06 RX ADMIN — APIXABAN SCH MG: 5 TABLET, FILM COATED ORAL at 07:34

## 2019-01-06 RX ADMIN — SUCRALFATE SCH GM: 1 TABLET ORAL at 07:33

## 2019-01-06 RX ADMIN — ASPIRIN 81 MG SCH MG: 81 TABLET ORAL at 07:33

## 2019-01-06 RX ADMIN — DILTIAZEM HYDROCHLORIDE 1 MG: 240 CAPSULE, EXTENDED RELEASE ORAL at 07:33

## 2019-01-06 RX ADMIN — LORAZEPAM SCH MG: 4 INJECTION, SOLUTION INTRAMUSCULAR; INTRAVENOUS at 05:48

## 2019-01-06 NOTE — PN
Date/Time of Note


Date/Time of Note


DATE: 1/6/19 


TIME: 13:32





Assessment/Plan


VTE Prophylaxis


Risk score (from Ns)>0 risk:  5


[place] [facility] [place]  [place]


SCD contraindicated:  other


Pharmacological prophylaxis:  apixaban





Lines/Catheters


IV Catheter Type (from [facility]):  Saline Lock


Urinary Cath still in place:  No





Assessment/Plan


Assessment/Plan


A: sob- improved, back to baseline


elevated bnp


tremor


hypokalemia


severe Aortic Stenosis


CAD


HTN





P: replete k


d/c home


cont outpt meds


f/u with cardiology in 1-2 wks


f/u with me next week


Result Diagram:  


1/6/19 0545                                                                     


          1/6/19 0545





Results 24hrs





Laboratory Tests


        Test
                                1/5/19
13:48  1/6/19
05:45


        Sodium Level                                138           139


        Potassium Level                             3.7          3.3  L


        Chloride Level                              103           104


        Carbon Dioxide Level                         24            27


        Anion Gap                                    11             8


        Blood Urea Nitrogen                          15            16


        Creatinine                                 0.62          0.62


        Est Glomerular Filtrat Rate
mL/min     
             



        Glucose Level                              135  #         94  #


        Calcium Level                               9.2           9.0


        White Blood Count                                        3.3  L


        Red Blood Count                                         4.46  L


        Hemoglobin                                              13.1  L


        Hematocrit                                              39.9  L


        Mean Corpuscular Volume                                  89.5


        Mean Corpuscular Hemoglobin                              29.4


        Mean Corpuscular Hemoglobin
Concent  
                  32.8  



        Red Cell Distribution Width                             16.2  H


        Platelet Count                                          129  #L


        Mean Platelet Volume                                    11.1  H


        Immature Granulocytes %                                 0.300


        Neutrophils %                                            71.9


        Lymphocytes %                                            9.8  L


        Monocytes %                                             17.1  H


        Eosinophils %                                             0.3


        Basophils %                                               0.6


        Nucleated Red Blood Cells %                               0.0


        Immature Granulocytes #                                 0.010


        Neutrophils #                                             2.4


        Lymphocytes #                                            0.3  L


        Monocytes #                                               0.6


        Eosinophils #                                             0.0


        Basophils #                                               0.0


        Nucleated Red Blood Cells #                               0.0


        Magnesium Level                                           2.2








Subjective


24 Hr Interval Summary


Free Text/Dictation


Pt feeling better today, back to baseline.  Ambulates to bathroom without sob.  


No cp.





Exam/Review of Systems


Vital Signs


Vitals





Vital Signs


  Date      Temp  Pulse  Resp  B/P (MAP)   Pulse Ox  O2          O2 Flow    FiO2


Time                                                 Delivery    Rate


    1/6/19           81


     12:30


    1/6/19  98.2           16      106/65        96  Room Air


     11:52                           (79)


    1/3/19                                                             2.0


     08:37








Intake and Output





1/5/19 1/5/19 1/6/19





1515:00


23:00


07:00





IntakeIntake Total


950 ml


220 ml





BalanceBalance


950 ml


220 ml














Exam


gen- nad, nontoxic


lungs- CTA


heart- RRR


abd- +BS, soft, nontender


ext- no edema





Medications


Medications





Current Medications


Apixaban (Eliquis) 5 mg BID PO  Last administered on 1/6/19at 07:34; Admin Dose 


5 MG;  Start 1/1/19 at 21:00


Aspirin (Aspirin) 81 mg DAILY PO  Last administered on 1/6/19at 07:33; Admin 


Dose 81 MG;  Start 1/2/19 at 09:00


Metoclopramide HCl (Reglan) 5 mg TID  PRN PO NAUSEA Last administered on 


1/3/19at 08:27; Admin Dose 5 MG;  Start 1/1/19 at 17:00


Pantoprazole (Protonix Tab) 40 mg DAILY@06 PO  Last administered on 1/6/19at 


05:47; Admin Dose 40 MG;  Start 1/2/19 at 06:00


Sucralfate (Carafate) 1 gm BID PO  Last administered on 1/6/19at 07:33; Admin 


Dose 1 GM;  Start 1/1/19 at 21:00


Diltiazem HCl (Cardizem Cd) 240 mg DAILY PO  Last administered on 1/6/19at 


07:33; Admin Dose 240 MG;  Start 1/2/19 at 09:00


Atorvastatin Calcium (Lipitor) 20 mg HS PO  Last administered on 1/5/19at 20:10;


Admin Dose 20 MG;  Start 1/1/19 at 21:00


Lorazepam (Ativan) 0.5 mg Q8 IV  Last administered on 1/6/19at 05:48; Admin Dose


0.5 MG;  Start 1/1/19 at 22:00


Simethicone (Mylicon) 80 mg Q6H  PRN PO DISTENSION/GAS/BLOATING Last 


administered on 1/4/19at 08:24; Admin Dose 80 MG;  Start 1/3/19 at 09:00


Furosemide (Lasix) 20 mg DAILY IV  Last administered on 1/6/19at 12:20; Admin 


Dose 20 MG;  Start 1/6/19 at 12:00











LISA BENNETT MD         Jan 6, 2019 13:36

## 2019-01-06 NOTE — PDOCDIS
Discharge Instructions


CONDITION


                 [id number]
Patient Condition:  [id number]
Fair








HOME CARE INSTRUCTIONS:


                  [id number]
Special Diet:  [id number]
u with Dr. Bennett this week











LISA BENNETT MD         Jan 6, 2019 13:39

## 2019-01-06 NOTE — CONS
Date/Time of Note


Date/Time of Note


DATE: 1/6/19 


TIME: 13:20





Assessment/Plan


Assessment/Plan


Hospital Course


78 yowm w/ severe AS, HCM (w/ LVOT obstruction), PAF, CAD (PCI of LAD, RCA), htn


and hld who presented w/ cp or sob.  Pt states he awoke yesterday and had chest 


pain and sob.  There was also concern for ETOH withdrawal, though pt states he 


only drinks a small amount daily.  He feels better today and states sob has 


improved. 


    Pt is well known to me from outpt clinic.  He is being evaluated for an AVR 


(possible TAVR).  Unfortunately, he also has HCM w/ LVOT obstruction, which 


complicates the treatment.  Making arrangements for a possible ETOH septal 


ablation prior to AVR (possible TAVR).  Pt was last see a few weeks ago (mid-


December), and he was stable then.





Update - pt c/o sob and nausea.  Spoke with his daughter (Beatrice) who states pt 


had a fall at home.  Asked her how much he drinks, and she says she doesn't 


know.  State he doesn't drink daily.  Pt remains in sinus rhythm.  He c/o sob.  


Does not appear to be grossly in failure, but will give a dose of lasix to see 


if it helps.   





Update 1/5/19 - Feels better.  Denies sob at rest.  Will give another dose of IV


lasix today.  If feeling better by later in the afternoon, may go home.  (Check 


lytes today and replete K if needed). 





Update 1/6/19 - feels back to baseline.  Ok to d/c home.  Keep home cardiac  


meds same. Can f/u at [facility] [place]


Result Diagram:  


1/6/19 0545                                                                     


          1/6/19 0545





Results 24hrs





Laboratory Tests


        Test
                                1/5/19
13:48  1/6/19
05:45


        Sodium Level                                138           139


        Potassium Level                             3.7          3.3  L


        Chloride Level                              103           104


        Carbon Dioxide Level                         24            27


        Anion Gap                                    11             8


        Blood Urea Nitrogen                          15            16


        Creatinine                                 0.62          0.62


        Est Glomerular Filtrat Rate
mL/min     
             



        Glucose Level                              135  #         94  #


        Calcium Level                               9.2           9.0


        White Blood Count                                        3.3  L


        Red Blood Count                                         4.46  L


        Hemoglobin                                              13.1  L


        Hematocrit                                              39.9  L


        Mean Corpuscular Volume                                  89.5


        Mean Corpuscular Hemoglobin                              29.4


        Mean Corpuscular Hemoglobin
Concent  
                  32.8  



        Red Cell Distribution Width                             16.2  H


        Platelet Count                                          129  #L


        Mean Platelet Volume                                    11.1  H


        Immature Granulocytes %                                 0.300


        Neutrophils %                                            71.9


        Lymphocytes %                                            9.8  L


        Monocytes %                                             17.1  H


        Eosinophils %                                             0.3


        Basophils %                                               0.6


        Nucleated Red Blood Cells %                               0.0


        Immature Granulocytes #                                 0.010


        Neutrophils #                                             2.4


        Lymphocytes #                                            0.3  L


        Monocytes #                                               0.6


        Eosinophils #                                             0.0


        Basophils #                                               0.0


        Nucleated Red Blood Cells #                               0.0


        Magnesium Level                                           2.2








Consultation Date/Type/Reason


Admit Date/Time


Jan 1, 2019 at 14:29


Initial Consult Date


1/2/19


Type of Consult


Cardiology





24 HR Interval Summary


Free Text/Dictation


Feeling better. Breathing is back to baseline.





Exam/Review of Systems


Vital Signs


Vitals





Vital Signs


  Date      Temp  Pulse  Resp  B/P (MAP)   Pulse Ox  O2          O2 Flow    FiO2


Time                                                 Delivery    Rate


    1/6/19           81


     12:30


    1/6/19  98.2           16      106/65        96  Room Air


     11:52                           (79)


    1/3/19                                                             2.0


     08:37








Intake and Output





1/5/19 1/5/19 1/6/19





1515:00


23:00


07:00





IntakeIntake Total


950 ml


220 ml





BalanceBalance


950 ml


220 ml














Exam


Constitutional:  alert; 


   No distress


Neck:  supple, other (difficult to assess JVP)


Respiratory:  clear to auscultation


Cardiovascular:  regular rate and rhythm, other (3/6 SKYLAR)


Extremities:  other (trace edema)


Neurological:  nl mental status





Medications


Medications





Current Medications


Apixaban (Eliquis) 5 mg BID PO  Last administered on 1/6/19at 07:34; Admin Dose 


5 MG;  Start 1/1/19 at 21:00


Aspirin (Aspirin) 81 mg DAILY PO  Last administered on 1/6/19at 07:33; Admin 


Dose 81 MG;  Start 1/2/19 at 09:00


Metoclopramide HCl (Reglan) 5 mg TID  PRN PO NAUSEA Last administered on 


1/3/19at 08:27; Admin Dose 5 MG;  Start 1/1/19 at 17:00


Pantoprazole (Protonix Tab) 40 mg DAILY@06 PO  Last administered on 1/6/19at 


05:47; Admin Dose 40 MG;  Start 1/2/19 at 06:00


Sucralfate (Carafate) 1 gm BID PO  Last administered on 1/6/19at 07:33; Admin 


Dose 1 GM;  Start 1/1/19 at 21:00


Diltiazem HCl (Cardizem Cd) 240 mg DAILY PO  Last administered on 1/6/19at 07:


33; Admin Dose 240 MG;  Start 1/2/19 at 09:00


Atorvastatin Calcium (Lipitor) 20 mg HS PO  Last administered on 1/5/19at 20:10;


Admin Dose 20 MG;  Start 1/1/19 at 21:00


Lorazepam (Ativan) 0.5 mg Q8 IV  Last administered on 1/6/19at 05:48; Admin Dose


0.5 MG;  Start 1/1/19 at 22:00


Simethicone (Mylicon) 80 mg Q6H  PRN PO DISTENSION/GAS/BLOATING Last 


administered on 1/4/19at 08:24; Admin Dose 80 MG;  Start 1/3/19 at 09:00


Furosemide (Lasix) 20 mg DAILY IV  Last administered on 1/6/19at 12:20; Admin 


Dose 20 MG;  Start 1/6/19 at 12:00











NALDO MARTIN                  Jan 6, 2019 13:23

## 2019-03-01 ENCOUNTER — HOSPITAL ENCOUNTER (INPATIENT)
Dept: HOSPITAL 91 - 2NE | Age: 79
LOS: 7 days | Discharge: HOME | DRG: 307 | End: 2019-03-08
Payer: MEDICARE

## 2019-03-01 ENCOUNTER — HOSPITAL ENCOUNTER (INPATIENT)
Dept: HOSPITAL 10 - E/R | Age: 79
LOS: 7 days | Discharge: HOME | DRG: 307 | End: 2019-03-08
Attending: INTERNAL MEDICINE | Admitting: INTERNAL MEDICINE
Payer: MEDICARE

## 2019-03-01 VITALS — HEART RATE: 62 BPM | RESPIRATION RATE: 18 BRPM | DIASTOLIC BLOOD PRESSURE: 72 MMHG | SYSTOLIC BLOOD PRESSURE: 141 MMHG

## 2019-03-01 VITALS — HEART RATE: 64 BPM | RESPIRATION RATE: 22 BRPM | SYSTOLIC BLOOD PRESSURE: 145 MMHG | DIASTOLIC BLOOD PRESSURE: 73 MMHG

## 2019-03-01 VITALS
WEIGHT: 203.05 LBS | BODY MASS INDEX: 29.07 KG/M2 | WEIGHT: 203.05 LBS | HEIGHT: 70 IN | BODY MASS INDEX: 29.07 KG/M2 | HEIGHT: 70 IN

## 2019-03-01 VITALS — HEART RATE: 66 BPM

## 2019-03-01 VITALS — HEART RATE: 73 BPM

## 2019-03-01 DIAGNOSIS — I35.0: Primary | ICD-10-CM

## 2019-03-01 DIAGNOSIS — E87.6: ICD-10-CM

## 2019-03-01 DIAGNOSIS — Z95.5: ICD-10-CM

## 2019-03-01 DIAGNOSIS — F10.239: ICD-10-CM

## 2019-03-01 DIAGNOSIS — D72.819: ICD-10-CM

## 2019-03-01 DIAGNOSIS — F41.9: ICD-10-CM

## 2019-03-01 DIAGNOSIS — I42.1: ICD-10-CM

## 2019-03-01 DIAGNOSIS — J44.9: ICD-10-CM

## 2019-03-01 DIAGNOSIS — N39.0: ICD-10-CM

## 2019-03-01 DIAGNOSIS — I48.91: ICD-10-CM

## 2019-03-01 DIAGNOSIS — G93.40: ICD-10-CM

## 2019-03-01 DIAGNOSIS — I10: ICD-10-CM

## 2019-03-01 DIAGNOSIS — K21.9: ICD-10-CM

## 2019-03-01 DIAGNOSIS — E78.5: ICD-10-CM

## 2019-03-01 DIAGNOSIS — Z87.891: ICD-10-CM

## 2019-03-01 DIAGNOSIS — I25.10: ICD-10-CM

## 2019-03-01 LAB
ADD MAN DIFF?: NO
ANION GAP: 14 (ref 5–13)
B-TYPE NATRIURETIC PEPTIDE: 429 PG/ML (ref 0–450)
BASOPHIL #: 0 10^3/UL (ref 0–0.1)
BASOPHILS %: 0.8 % (ref 0–2)
BLOOD UREA NITROGEN: 10 MG/DL (ref 7–20)
CALCIUM: 8.8 MG/DL (ref 8.4–10.2)
CARBON DIOXIDE: 22 MMOL/L (ref 21–31)
CHLORIDE: 111 MMOL/L (ref 97–110)
CK INDEX: 2.8
CK-MB: 1.35 NG/ML (ref 0–2.4)
CREATINE KINASE: 48 IU/L (ref 23–200)
CREATININE: 0.64 MG/DL (ref 0.61–1.24)
EOSINOPHILS #: 0 10^3/UL (ref 0–0.5)
EOSINOPHILS %: 0 % (ref 0–7)
GLUCOSE: 82 MG/DL (ref 70–220)
HEMATOCRIT: 43.4 % (ref 42–52)
HEMOGLOBIN: 13.5 G/DL (ref 14–18)
IMMATURE GRANS #M: 0.01 10^3/UL (ref 0–0.03)
IMMATURE GRANS % (M): 0.3 % (ref 0–0.43)
INR: 1.06
LYMPHOCYTES #: 1 10^3/UL (ref 0.8–2.9)
LYMPHOCYTES %: 26.3 % (ref 15–51)
MEAN CORPUSCULAR HEMOGLOBIN: 28.3 PG (ref 29–33)
MEAN CORPUSCULAR HGB CONC: 31.1 G/DL (ref 32–37)
MEAN CORPUSCULAR VOLUME: 91 FL (ref 82–101)
MEAN PLATELET VOLUME: 11.3 FL (ref 7.4–10.4)
MONOCYTE #: 0.4 10^3/UL (ref 0.3–0.9)
MONOCYTES %: 11.4 % (ref 0–11)
NEUTROPHIL #: 2.2 10^3/UL (ref 1.6–7.5)
NEUTROPHILS %: 61.2 % (ref 39–77)
NUCLEATED RED BLOOD CELLS #: 0 10^3/UL (ref 0–0)
NUCLEATED RED BLOOD CELLS%: 0 /100WBC (ref 0–0)
PARTIAL THROMBOPLASTIN TIME: 29 SEC (ref 23–35)
PLATELET COUNT: 132 10^3/UL (ref 140–415)
POTASSIUM: 4.4 MMOL/L (ref 3.5–5.1)
PROTIME: 13.9 SEC (ref 11.9–14.9)
PT RATIO: 1.1
RED BLOOD COUNT: 4.77 10^6/UL (ref 4.7–6.1)
RED CELL DISTRIBUTION WIDTH: 15.8 % (ref 11.5–14.5)
SODIUM: 147 MMOL/L (ref 135–144)
TROPONIN-I: 0.02 NG/ML (ref 0–0.12)
TROPONIN-I: < 0.012 NG/ML (ref 0–0.12)
WHITE BLOOD COUNT: 3.6 10^3/UL (ref 4.8–10.8)

## 2019-03-01 PROCEDURE — 87086 URINE CULTURE/COLONY COUNT: CPT

## 2019-03-01 PROCEDURE — 80061 LIPID PANEL: CPT

## 2019-03-01 PROCEDURE — 85730 THROMBOPLASTIN TIME PARTIAL: CPT

## 2019-03-01 PROCEDURE — 85610 PROTHROMBIN TIME: CPT

## 2019-03-01 PROCEDURE — 80048 BASIC METABOLIC PNL TOTAL CA: CPT

## 2019-03-01 PROCEDURE — 82553 CREATINE MB FRACTION: CPT

## 2019-03-01 PROCEDURE — 70450 CT HEAD/BRAIN W/O DYE: CPT

## 2019-03-01 PROCEDURE — 80053 COMPREHEN METABOLIC PANEL: CPT

## 2019-03-01 PROCEDURE — 84484 ASSAY OF TROPONIN QUANT: CPT

## 2019-03-01 PROCEDURE — 96374 THER/PROPH/DIAG INJ IV PUSH: CPT

## 2019-03-01 PROCEDURE — 93005 ELECTROCARDIOGRAM TRACING: CPT

## 2019-03-01 PROCEDURE — 83735 ASSAY OF MAGNESIUM: CPT

## 2019-03-01 PROCEDURE — 85025 COMPLETE CBC W/AUTO DIFF WBC: CPT

## 2019-03-01 PROCEDURE — 99285 EMERGENCY DEPT VISIT HI MDM: CPT

## 2019-03-01 PROCEDURE — 83880 ASSAY OF NATRIURETIC PEPTIDE: CPT

## 2019-03-01 PROCEDURE — 71045 X-RAY EXAM CHEST 1 VIEW: CPT

## 2019-03-01 PROCEDURE — 82140 ASSAY OF AMMONIA: CPT

## 2019-03-01 PROCEDURE — 82550 ASSAY OF CK (CPK): CPT

## 2019-03-01 PROCEDURE — 36415 COLL VENOUS BLD VENIPUNCTURE: CPT

## 2019-03-01 PROCEDURE — 84436 ASSAY OF TOTAL THYROXINE: CPT

## 2019-03-01 PROCEDURE — 84479 ASSAY OF THYROID (T3 OR T4): CPT

## 2019-03-01 RX ADMIN — ATORVASTATIN CALCIUM SCH MG: 40 TABLET, FILM COATED ORAL at 21:19

## 2019-03-01 RX ADMIN — LORAZEPAM 1 MG: 2 INJECTION, SOLUTION INTRAMUSCULAR; INTRAVENOUS at 11:41

## 2019-03-01 RX ADMIN — SUCRALFATE SCH GM: 1 TABLET ORAL at 21:18

## 2019-03-01 RX ADMIN — ASPIRIN 81 MG CHEWABLE TABLET 1 MG: 81 TABLET CHEWABLE at 11:41

## 2019-03-01 RX ADMIN — DEXAMETHASONE SODIUM PHOSPHATE PRN MG: 10 INJECTION, SOLUTION INTRAMUSCULAR; INTRAVENOUS at 23:04

## 2019-03-01 RX ADMIN — BUSPIRONE HYDROCHLORIDE 1 MG: 5 TABLET ORAL at 21:18

## 2019-03-01 RX ADMIN — APIXABAN 1 MG: 5 TABLET, FILM COATED ORAL at 21:18

## 2019-03-01 RX ADMIN — LORAZEPAM 1 MG: 0.5 TABLET ORAL at 23:13

## 2019-03-01 RX ADMIN — ATORVASTATIN CALCIUM 1 MG: 40 TABLET, FILM COATED ORAL at 21:19

## 2019-03-01 RX ADMIN — ONDANSETRON HYDROCHLORIDE 1 MG: 2 INJECTION, SOLUTION INTRAMUSCULAR; INTRAVENOUS at 23:04

## 2019-03-01 RX ADMIN — METOPROLOL SUCCINATE 1 MG: 100 TABLET, EXTENDED RELEASE ORAL at 15:57

## 2019-03-01 RX ADMIN — SUCRALFATE 1 GM: 1 TABLET ORAL at 21:18

## 2019-03-01 RX ADMIN — LORAZEPAM PRN MG: 0.5 TABLET ORAL at 23:13

## 2019-03-01 RX ADMIN — DILTIAZEM HYDROCHLORIDE 1 MG: 240 CAPSULE, EXTENDED RELEASE ORAL at 15:57

## 2019-03-01 RX ADMIN — APIXABAN SCH MG: 5 TABLET, FILM COATED ORAL at 21:18

## 2019-03-01 RX ADMIN — PANTOPRAZOLE SODIUM 1 MG: 40 TABLET, DELAYED RELEASE ORAL at 15:56

## 2019-03-01 RX ADMIN — METOPROLOL SUCCINATE SCH MG: 100 TABLET, FILM COATED, EXTENDED RELEASE ORAL at 15:57

## 2019-03-01 RX ADMIN — ACETAMINOPHEN 1 MG: 325 TABLET, FILM COATED ORAL at 23:08

## 2019-03-01 RX ADMIN — BUSPIRONE HYDROCHLORIDE SCH MG: 5 TABLET ORAL at 21:18

## 2019-03-01 RX ADMIN — PANTOPRAZOLE SODIUM SCH MG: 40 TABLET, DELAYED RELEASE ORAL at 15:56

## 2019-03-01 RX ADMIN — DILTIAZEM HYDROCHLORIDE SCH MG: 240 CAPSULE, EXTENDED RELEASE ORAL at 15:57

## 2019-03-01 NOTE — HP
DATE OF ADMISSION: 03/01/2019

 

CHIEF COMPLAINT AND HISTORY OF PRESENT ILLNESS:  The patient is a 78-year-old gentleman well known to
 me from previous followup, presenting with chest pressure and shortness of breath which has been pro
gressively getting worse over the last several weeks.  The patient was evaluated in the emergency jack
 and was admitted.  The patient has a complicated cardiac history with history of atrial fibrillatio
n with recurrent rapid ventricular response, has been hospitalized several times for same and also ha
s been diagnosed to have moderate to severe aortic stenosis, history of hypertrophic obstructive card
iomyopathy and history of coronary artery disease, status post PCI.

 

MEDICATIONS:  Include:

1.  Diltiazem.

2.  Metoprolol.

3.  BuSpar.

4.  Aspirin.

5.  Eliquis.

6.  Carafate.

7.  Lorazepam.

 

REVIEW OF SYSTEMS:

HEAD:  No history of headaches, focal weakness or numbness.

EYES:  No blurry vision or glaucoma.  Did have a previous stroke from which he resolved completely.

ENT:  Noncontributory.

NECK:  No history of thyroid disease.

CHEST:  No bronchitis, hay fever or asthma.  The patient has had prior chest x-ray showing calcified 
pleural plaques, query asbestos exposure in the past, was smoker for about 10 years and quit about 40
 years back.  No history of hemoptysis or TB.

HEART:  As above.

GASTROINTESTINAL:  History of GERD and diverticulosis, history of cholelithiasis.

GENITOURINARY:  No dysuria, hematuria, kidney stones.

HEMATOLOGIC:  History of chronic leukopenia without any evidence of myelodysplasia.

GENERAL:  No history of weight loss or weight gain.

HABITS:  Drinks on a regular basis, has been cutting down intake of alcohol recently.

 

PHYSICAL EXAMINATION:

GENERAL:  The patient is an average-built male who appears anxious.

VITAL SIGNS:  Temperature 97.3, O2 saturation 100% on room air, in mild respiratory distress with min
imal exertion.  Blood pressure 151/70, respiratory rate 20 per minute, afebrile.

HEENT:  Head is normocephalic.  Tongue is coated, dry.

NECK:  Supple.

CHEST:  Decreased breath sounds at bases.

HEART:  S1, S2 with no definite gallops.

ABDOMEN:  Soft, nontender, no hepatosplenomegaly, obese anterior abdominal wall.

EXTREMITIES:  No edema.  Pedals are poorly palpable.  Homans sign is negative.

NEUROLOGIC:  No localizing or lateralizing signs.

 

LABORATORY DATA:  WBC count 3.6, hematocrit 43.4.  Sodium 147, potassium 4.4, BUN 10, creatinine 0.64
, glucose 82.  BNP is 429.  PT/INR is 1.06, PTT 29.

 

DIAGNOSTIC DATA:  Chest x-ray shows mild right basilar and linear atelectatic changes, mild left uppe
r lobe scarring, calcified granuloma of left upper lobe.

 

IMPRESSION:

1.  Shortness of breath, appears somewhat anxious.  The patient probably also has underlying element 
of chronic obstructive pulmonary disease as well.

2.  Severe aortic stenosis.

3.  Coronary artery disease, status post PCI of LAD and RCA.

4.  Hypertension.

5.  Gastroesophageal reflux disease.

6.  Chronic leukopenia.

 

PLAN:  We will continue anxiolytics.  Obtain PFTs.  We will also request cardiac consultation with Dr Sujey Lemus.  This patient was being considered for aortic valve replacement.  We will discuss regardi
ng same with Dr. Lemus and Dr. Luis.  Continue beta blockers and calcium channel blockers for now
.  Observe for signs of failure.

 

 

Dictated By: ASAD VAUGHN MD, SR/RENE

DD:    03/01/2019 15:36:16

DT:    03/01/2019 17:57:04

Conf#: [id number]

DID#:  [id number]

## 2019-03-01 NOTE — ERD
ER Documentation


Chief Complaint


Chief Complaint





WORSENING CHEST PAIN W/ SOB NON-PROVOKED NON-RADIATING





HPI


78-year-old gentleman who presents emergency room complaining of chest pain and 


shortness of breath.  The patient has a history of cardiac disease.  He states 


symptoms for approximately 24 hours.  He denies positional shortness of breath. 


The patient is very anxious upon arrival.  No pleuritic pain fevers chills or 


cough.





ROS


All systems reviewed and are negative except as per history of present illness.





Medications


Home Meds


Reported Medications


Diltiazem Hcl* (Cardizem CD*) 240 Mg Cap.sr.24h, 240 MG PO DAILY, #30 CAP


   1/1/19


Lorazepam* (Lorazepam*) 0.5 Mg Tablet, 0.5 MG PO HS PRN for ANXIETY, TAB


   10/8/18


Metoclopramide Hcl* (Metoclopramide Hcl*) 5 Mg Tablet, 5 MG PO Q8H PRN for 


NAUSEA AND OR VOMITING, TAB


   10/8/18


Sucralfate* (Carafate*) 1 Gm Tab, 1 GM PO BID, TAB


   2/10/18


Simvastatin* (Zocor*) 40 Mg Tablet, 40 MG PO QHS, #30 TAB


   2/10/18


Pantoprazole* (Protonix*) 40 Mg Tablet.dr, 40 MG PO DAILY, TAB


   2/10/18


Buspirone Hcl* (Buspar*) 5 Mg Tab, 5 MG PO BID, TAB


   2/10/18


Aspirin (Low Dose Aspirin) 81 Mg Tablet.dr, 81 MG PO DAILY, #30 TAB


   2/10/18


Apixaban* (Eliquis*) 5 Mg Tablet, 5 MG PO BID, TAB


   2/10/18





Allergies


Allergies:  


Coded Allergies:  


     No Known Allergies (Verified  Allergy, Unknown, 1/5/19)





PMhx/Soc


History of Surgery:  Yes


Anesthesia Reaction:  No


Hx Neurological Disorder:  No


Hx Respiratory Disorders:  No


Hx Cardiac Disorders:  Yes (HTN, STENTS 2017, AORTIC STENOSIS, AFIB)


Hx Psychiatric Problems:  Yes (ANXIETY)


Hx Miscellaneous Medical Probl:  Yes (ALCOHOL ABUSE)


Hx Alcohol Use:  Yes


Hx Substance Use:  Yes


Hx Tobacco Use:  Yes (QUIT 54 YRS AGO)


Smoking Status:  Former smoker





FmHx


Family History:  No diabetes





Physical Exam


Vitals





Vital Signs


  Date      Temp  Pulse  Resp  B/P (MAP)   Pulse Ox  O2          O2 Flow    FiO2


Time                                                 Delivery    Rate


    3/1/19  97.3     55    30      151/70       100


     11:26                           (97)


    3/1/19           60    16      134/67       100  Room Air


     11:25                           (89)





Physical Exam


General: Extremely anxious


Head: Normocephalic, atraumatic.


Eyes: Pupils equally reactive, EOM intact


ENT: Moist mucous membranes


Neck: Supple, no lymphadenopathy


Respiratory: Lungs clear bilaterally, no distress


Cardiovascular: RRR, no murmurs, rubs, or gallops


Abdominal: Soft, non-tender, non-distended, no peritoneal signs


: Deferred


MSK: No edema, no unilateral swelling, 5/5 strength


Neurologic: Alert and oriented, moving all extremities, normal speech, no focal 


weakness, no cerebellar signs


Skin: No rash


Psych: Anxious mood


Result Diagram:  


3/1/19 1140                                                                     


          3/1/19 1140





Results 24 hrs





Laboratory Tests


              Test
                                  3/1/19
11:40


              White Blood Count                      3.6 10^3/ul


              Red Blood Count                       4.77 10^6/ul


              Hemoglobin                               13.5 g/dl


              Hematocrit                                  43.4 %


              Mean Corpuscular Volume                    91.0 fl


              Mean Corpuscular Hemoglobin                28.3 pg


              Mean Corpuscular Hemoglobin
Concent     31.1 g/dl 



              Red Cell Distribution Width                 15.8 %


              Platelet Count                         132 10^3/UL


              Mean Platelet Volume                       11.3 fl


              Immature Granulocytes %                    0.300 %


              Neutrophils %                               61.2 %


              Lymphocytes %                               26.3 %


              Monocytes %                                 11.4 %


              Eosinophils %                                0.0 %


              Basophils %                                  0.8 %


              Nucleated Red Blood Cells %            0.0 /100WBC


              Immature Granulocytes #              0.010 10^3/ul


              Neutrophils #                          2.2 10^3/ul


              Lymphocytes #                          1.0 10^3/ul


              Monocytes #                            0.4 10^3/ul


              Eosinophils #                          0.0 10^3/ul


              Basophils #                            0.0 10^3/ul


              Nucleated Red Blood Cells #            0.0 10^3/ul


              Prothrombin Time                          13.9 Sec


              Prothrombin Time Ratio                         1.1


              INR International Normalized
Ratio           1.06 



              Activated Partial
Thromboplast Time      29.0 Sec 



              Sodium Level                            147 mmol/L


              Potassium Level                         4.4 mmol/L


              Chloride Level                          111 mmol/L


              Carbon Dioxide Level                     22 mmol/L


              Anion Gap                                       14


              Blood Urea Nitrogen                       10 mg/dl


              Creatinine                              0.64 mg/dl


              Est Glomerular Filtrat Rate
mL/min    mL/min 



              Glucose Level                             82 mg/dl


              Calcium Level                            8.8 mg/dl


              Troponin I                             0.017 ng/ml


              B-Type Natriuretic Peptide               429 PG/ML





Current Medications


 Medications
   Dose
          Sig/Graciela
       Start Time
   Status  Last


 (Trade)       Ordered        Route
 PRN     Stop Time              Admin
Dose


                              Reason                                Admin


 Aspirin
       162 mg         ONCE  STAT
    3/1/19        DC            3/1/19


(Aspirin)                     PO
            11:33
 3/1/19                11:41



                                             11:34


 Lorazepam
     0.5 mg         ONCE  ONCE
    3/1/19        DC            3/1/19


(Ativan)                      IV
            12:00
 3/1/19                11:41



                                             12:01


 Ondansetron    4 mg           [facility]      3/1/19                 



HCl
  (Zofran                 PRN
 IV
       13:30
 3/2/19


Inj)                          NAUSEA/VOMITI  13:29


                              NG


                650 mg         [facility]      3/1/19                 



Acetaminophen                 PRN
 PO
       13:30
 3/2/19



  (Tylenol                   .MILD PAIN     13:29


Tab)                          1-3 OR TEMP








Procedures/MDM


EKG, MONITORS, & DIAGNOSTIC IMAGING:


EKG: I reviewed and interpreted a 12-lead EKG. 


Rhythm: Normal sinus rhythm


ST Changes: Subtle ST segment abnormalities in the precordial leads consistent 


with baseline


T waves: No contiguous T wave inversions


Impression: Abnormal EKG but stable





Repeat EKG:


EKG: I reviewed and interpreted a 12-lead EKG. 


Rhythm: Normal sinus rhythm


ST Changes: Subtle ST segment abnormalities in the precordial leads consistent 


with baseline


T waves: No contiguous T wave inversions


Impression: Abnormal EKG but stable





Chest x-ray: I reviewed and interpreted a 1 view of the chest


Mediastinum: No enlargement


Cardiac silhouette: No cardiomegaly


Airspace: Clear lung fields bilaterally without evidence of pneumothorax


Bones: No evidence of fracture








PROCEDURES:


[None]





LAB INTERPRETATION:


Negative troponin and BNP





MEDICAL DECISION MAKING:


The patient's history, physical exam and clinical presentation is concerning for


 possible cardiogenic etiology and acute coronary syndrome.  The patient is a 


strong component of anxiety and I believe in the end his presentation is very 


consistent with anxiety and panic attack.  This is similar to documentation with


 prior hospitalizations including most recent hospitalization.  Lower concern 


for CHF.  No evidence of PE.





Based on the patient's clinical exam and history and risk factors, I have a much


 lower clinical concern for pulmonary embolism, acute aortic dissection, 


pneumothorax, pneumonia, cardiac tamponade





HEART Score: Greater than 4


MACE Rate: 16.6%





Shared Decision Making: We had a conversation regarding risk stratification, 


MACE rate, and the risks, benefits, alternatives of disposition planning 


options.





Disposition planning: Admission





ER COURSE:


* Patient was given anxiolysis.  Aspirin.


* Symptoms improved.  The patient will be admitted for rule out





CONSULTATION:


[None]





DISPOSITION PLAN:


Telemetry admission for management of chest pain to rule out acute coronary 


syndrome, serial enzymes, risk stratification and consideration of provocative 


testing





CONSULTATION:


Accepting care team and consultations:


I discussed the current laboratory data, diagnostic imaging and emergency care 


provided.


Admitting team: Dr. Mata


Admitting team indication: Insurance directed





Departure


Diagnosis:  


   Primary Impression:  


   Chest pain


   Chest pain type:  unspecified  Qualified Codes:  R07.9 - Chest pain, 


   unspecified


   Additional Impressions:  


   Shortness of breath


   Anxiety reaction


   Pancytopenia


Condition:  Stable











CRUZITO GOTTLIEB MD           Mar 1, 2019 13:11

## 2019-03-02 VITALS — DIASTOLIC BLOOD PRESSURE: 82 MMHG | SYSTOLIC BLOOD PRESSURE: 142 MMHG

## 2019-03-02 VITALS — DIASTOLIC BLOOD PRESSURE: 66 MMHG | HEART RATE: 63 BPM | RESPIRATION RATE: 18 BRPM | SYSTOLIC BLOOD PRESSURE: 147 MMHG

## 2019-03-02 VITALS — SYSTOLIC BLOOD PRESSURE: 182 MMHG | RESPIRATION RATE: 16 BRPM | HEART RATE: 62 BPM | DIASTOLIC BLOOD PRESSURE: 91 MMHG

## 2019-03-02 VITALS — HEART RATE: 59 BPM | RESPIRATION RATE: 18 BRPM | DIASTOLIC BLOOD PRESSURE: 74 MMHG | SYSTOLIC BLOOD PRESSURE: 155 MMHG

## 2019-03-02 VITALS — DIASTOLIC BLOOD PRESSURE: 86 MMHG | RESPIRATION RATE: 18 BRPM | SYSTOLIC BLOOD PRESSURE: 179 MMHG | HEART RATE: 65 BPM

## 2019-03-02 VITALS — HEART RATE: 50 BPM

## 2019-03-02 VITALS — SYSTOLIC BLOOD PRESSURE: 127 MMHG | DIASTOLIC BLOOD PRESSURE: 69 MMHG | RESPIRATION RATE: 19 BRPM

## 2019-03-02 VITALS — DIASTOLIC BLOOD PRESSURE: 79 MMHG | RESPIRATION RATE: 18 BRPM | SYSTOLIC BLOOD PRESSURE: 168 MMHG | HEART RATE: 67 BPM

## 2019-03-02 VITALS — HEART RATE: 56 BPM

## 2019-03-02 VITALS — HEART RATE: 58 BPM

## 2019-03-02 VITALS — HEART RATE: 57 BPM

## 2019-03-02 LAB
ABNORMAL IP MESSAGE: 1
ADD MAN DIFF?: NO
ALANINE AMINOTRANSFERASE: 26 IU/L (ref 13–69)
ALBUMIN/GLOBULIN RATIO: 1.14
ALBUMIN: 4 G/DL (ref 3.3–4.9)
ALKALINE PHOSPHATASE: 76 IU/L (ref 42–121)
ANION GAP: 10 (ref 5–13)
ASPARTATE AMINO TRANSFERASE: 49 IU/L (ref 15–46)
BASOPHIL #: 0 10^3/UL (ref 0–0.1)
BASOPHILS %: 0.4 % (ref 0–2)
BILIRUBIN,DIRECT: 0 MG/DL (ref 0–0.2)
BILIRUBIN,TOTAL: 1.5 MG/DL (ref 0.2–1.3)
BLOOD UREA NITROGEN: 12 MG/DL (ref 7–20)
CALCIUM: 8.8 MG/DL (ref 8.4–10.2)
CARBON DIOXIDE: 25 MMOL/L (ref 21–31)
CHLORIDE: 106 MMOL/L (ref 97–110)
CHOL/HDL RATIO: 2.8 RATIO
CHOLESTEROL: 184 MG/DL (ref 100–200)
CK INDEX: 2.8
CK-MB: 1.46 NG/ML (ref 0–2.4)
CREATINE KINASE: 53 IU/L (ref 23–200)
CREATININE: 0.59 MG/DL (ref 0.61–1.24)
EOSINOPHILS #: 0 10^3/UL (ref 0–0.5)
EOSINOPHILS %: 0 % (ref 0–7)
FREE THYROXINE INDEX (CALC): 2.84 UG/ML (ref 0.65–3.89)
GLOBULIN: 3.5 G/DL (ref 1.3–3.2)
GLUCOSE: 122 MG/DL (ref 70–220)
HDL CHOLESTEROL: 64 MG/DL (ref 31–75)
HEMATOCRIT: 40.3 % (ref 42–52)
HEMOGLOBIN: 12.9 G/DL (ref 14–18)
IMMATURE GRANS #M: 0.02 10^3/UL (ref 0–0.03)
IMMATURE GRANS % (M): 0.8 % (ref 0–0.43)
LDL CHOLESTEROL,CALCULATED: 109 MG/DL
LYMPHOCYTES #: 0.2 10^3/UL (ref 0.8–2.9)
LYMPHOCYTES %: 7.6 % (ref 15–51)
MAGNESIUM: 2 MG/DL (ref 1.7–2.5)
MEAN CORPUSCULAR HEMOGLOBIN: 28.9 PG (ref 29–33)
MEAN CORPUSCULAR HGB CONC: 32 G/DL (ref 32–37)
MEAN CORPUSCULAR VOLUME: 90.2 FL (ref 82–101)
MEAN PLATELET VOLUME: 10.9 FL (ref 7.4–10.4)
MONOCYTE #: 0.3 10^3/UL (ref 0.3–0.9)
MONOCYTES %: 10.8 % (ref 0–11)
NEUTROPHIL #: 2 10^3/UL (ref 1.6–7.5)
NEUTROPHILS %: 80.4 % (ref 39–77)
NUCLEATED RED BLOOD CELLS #: 0 10^3/UL (ref 0–0)
NUCLEATED RED BLOOD CELLS%: 0 /100WBC (ref 0–0)
PLATELET COUNT: 100 10^3/UL (ref 140–415)
POSITIVE DIFF: (no result)
POTASSIUM: 4.1 MMOL/L (ref 3.5–5.1)
RED BLOOD COUNT: 4.47 10^6/UL (ref 4.7–6.1)
RED CELL DISTRIBUTION WIDTH: 15.4 % (ref 11.5–14.5)
SODIUM: 141 MMOL/L (ref 135–144)
T3 UPTAKE: 35.9 % (ref 23.5–40.5)
T4 (THYROXINE): 7.9 UG/DL (ref 5.5–11)
TOTAL PROTEIN: 7.5 G/DL (ref 6.1–8.1)
TRIGLYCERIDES: 53 MG/DL (ref 0–149)
TROPONIN-I: 0.01 NG/ML (ref 0–0.12)
WHITE BLOOD COUNT: 2.5 10^3/UL (ref 4.8–10.8)

## 2019-03-02 RX ADMIN — ATORVASTATIN CALCIUM 1 MG: 40 TABLET, FILM COATED ORAL at 20:34

## 2019-03-02 RX ADMIN — APIXABAN SCH MG: 5 TABLET, FILM COATED ORAL at 08:32

## 2019-03-02 RX ADMIN — PANTOPRAZOLE SODIUM 1 MG: 40 TABLET, DELAYED RELEASE ORAL at 08:32

## 2019-03-02 RX ADMIN — BUSPIRONE HYDROCHLORIDE 1 MG: 5 TABLET ORAL at 08:32

## 2019-03-02 RX ADMIN — SUCRALFATE 1 GM: 1 TABLET ORAL at 08:32

## 2019-03-02 RX ADMIN — DEXAMETHASONE SODIUM PHOSPHATE PRN MG: 10 INJECTION, SOLUTION INTRAMUSCULAR; INTRAVENOUS at 15:09

## 2019-03-02 RX ADMIN — BUSPIRONE HYDROCHLORIDE SCH MG: 5 TABLET ORAL at 20:34

## 2019-03-02 RX ADMIN — ATORVASTATIN CALCIUM SCH MG: 40 TABLET, FILM COATED ORAL at 20:34

## 2019-03-02 RX ADMIN — METOPROLOL SUCCINATE SCH MG: 100 TABLET, FILM COATED, EXTENDED RELEASE ORAL at 08:32

## 2019-03-02 RX ADMIN — FUROSEMIDE 1 MG: 10 INJECTION, SOLUTION INTRAVENOUS at 13:55

## 2019-03-02 RX ADMIN — PANTOPRAZOLE SODIUM SCH MG: 40 TABLET, DELAYED RELEASE ORAL at 08:32

## 2019-03-02 RX ADMIN — BUSPIRONE HYDROCHLORIDE 1 MG: 5 TABLET ORAL at 20:34

## 2019-03-02 RX ADMIN — DILTIAZEM HYDROCHLORIDE SCH MG: 240 CAPSULE, EXTENDED RELEASE ORAL at 08:33

## 2019-03-02 RX ADMIN — ONDANSETRON HYDROCHLORIDE 1 MG: 2 INJECTION, SOLUTION INTRAMUSCULAR; INTRAVENOUS at 15:09

## 2019-03-02 RX ADMIN — ASPIRIN 1 MG: 81 TABLET, COATED ORAL at 08:33

## 2019-03-02 RX ADMIN — ASPIRIN SCH MG: 81 TABLET, COATED ORAL at 08:33

## 2019-03-02 RX ADMIN — DILTIAZEM HYDROCHLORIDE 1 MG: 240 CAPSULE, EXTENDED RELEASE ORAL at 08:33

## 2019-03-02 RX ADMIN — BUSPIRONE HYDROCHLORIDE SCH MG: 5 TABLET ORAL at 08:32

## 2019-03-02 RX ADMIN — APIXABAN 1 MG: 5 TABLET, FILM COATED ORAL at 20:34

## 2019-03-02 RX ADMIN — SUCRALFATE 1 GM: 1 TABLET ORAL at 20:34

## 2019-03-02 RX ADMIN — APIXABAN SCH MG: 5 TABLET, FILM COATED ORAL at 20:34

## 2019-03-02 RX ADMIN — METOPROLOL SUCCINATE 1 MG: 100 TABLET, EXTENDED RELEASE ORAL at 08:32

## 2019-03-02 RX ADMIN — APIXABAN 1 MG: 5 TABLET, FILM COATED ORAL at 08:32

## 2019-03-02 RX ADMIN — SUCRALFATE SCH GM: 1 TABLET ORAL at 20:34

## 2019-03-02 RX ADMIN — SUCRALFATE SCH GM: 1 TABLET ORAL at 08:32

## 2019-03-02 NOTE — CONS
Assessment/Plan


Assessment/Plan


Hospital Course (Demo Recall)


1.Dyspnea on exertion- likely related to severe AS with HCM


- gentle diuresis 


- cont bp control, if remains elevated can consider switch diltiazem to 


verapamil and monitor





2.  Atrial fibrillation. currently nsr, on eliquis for prophy. on metoprolol and


dilt





3. AS- severe on heart cath. awaiting outpt eval for TAVR/ETOH ablation vs. 


AVR/myoectomy will d/w Dr. Luis





4. Hypertrophic cardiomyopathy- likely related to severe AS


- cont bp control, bb





5.  Hypertension


- elevated on admit will observe and titrate meds if needed 





6. Hyperlipidemia


- on statin.





7.CAD hx- stable, neg serial trop


- contstatin/bb





Consultation Date/Type/Reason


Admit Date/Time


Mar 1, 2019 at 13:09


Date of Consultation:  Mar 2, 2019


Type of Consult


Cardiology


Reason for Consultation


SOB


Requesting Provider:  ASAD VAUGHN MD


Date/Time of Note


DATE: 3/2/19 


TIME: 12:52





Hx of Present Illness


78 yowm w/ severe AS, HCM (w/ LVOT obstruction), PAF, CAD (PCI of LAD, RCA), htn


and hld who presented w/ cp or sob. Pt reports progressive dyspnea on exertion 


over the last few weeks.  States feels sob/tired walking back/forth to the 


bathroom. 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 


so typically active previously he states. no sob at rest or sleeping. no 


palpitations, heart racing, dizziness syncope. no chest pain/pressure he states.


no abd pain he states.  compliant with meds, stopped etoh 2 mo ago he states af


ter intervention by family.  denies any h/o withdrawals.


all other systems negative





Past Medical History


Severe AS


HCM w/ LVOT obstruction


afib


CAD (PCI of LAD and RCA)


htn 


hld


Home Meds


Reported Medications


Lorazepam* (Lorazepam*) 0.5 Mg Tablet, 0.5 MG PO Q6 PRN for ANXIETY, TAB


   3/1/19


Metoprolol Succinate* (Toprol XL*) 100 Mg Tab.sr.24h, 100 MG PO DAILY, #30 TAB


   3/1/19


Simethicone* (Mylicon*) 80 Mg Tab, 80 MG PO Q6H PRN for DISTENSION/GAS/BLOATING,


TAB


   3/1/19


Diltiazem Hcl* (Cardizem CD*) 240 Mg Cap.sr.24h, 240 MG PO DAILY, #30 CAP


   1/1/19


Lorazepam* (Lorazepam*) 0.5 Mg Tablet, 0.5 MG PO HS PRN for ANXIETY, TAB


   10/8/18


Sucralfate* (Carafate*) 1 Gm Tab, 1 GM PO BID, TAB


   2/10/18


Simvastatin* (Zocor*) 40 Mg Tablet, 40 MG PO QHS, #30 TAB


   2/10/18


Pantoprazole* (Protonix*) 40 Mg Tablet.dr, 40 MG PO DAILY, TAB


   2/10/18


Buspirone Hcl* (Buspar*) 5 Mg Tab, 5 MG PO BID, TAB


   2/10/18


Aspirin (Low Dose Aspirin) 81 Mg Tablet.dr, 81 MG PO DAILY, #30 TAB


   2/10/18


Apixaban* (Eliquis*) 5 Mg Tablet, 5 MG PO BID, TAB


   2/10/18


Discontinued Reported Medications


Metoclopramide Hcl* (Metoclopramide Hcl*) 5 Mg Tablet, 5 MG PO Q8H PRN for 


NAUSEA AND OR VOMITING, TAB


   10/8/18


Medications





Current Medications


Ondansetron HCl (Zofran Inj) 4 mg ER BRIDGE PRN IV NAUSEA/VOMITING;  Start 


3/1/19 at 13:30;  Stop 3/2/19 at 13:29


Acetaminophen (Tylenol Tab) 650 mg ER BRIDGE PRN PO .MILD PAIN 1-3 OR TEMP;  


Start 3/1/19 at 13:30;  Stop 3/2/19 at 13:29


Apixaban (Eliquis) 5 mg BID PO  Last administered on 3/2/19at 08:32; Admin Dose 


5 MG;  Start 3/1/19 at 21:00


Aspirin (Halfprin) 81 mg DAILY PO  Last administered on 3/2/19at 08:33; Admin 


Dose 81 MG;  Start 3/2/19 at 09:00


Buspirone HCl (Buspar) 5 mg BID PO  Last administered on 3/2/19at 08:32; Admin 


Dose 5 MG;  Start 3/1/19 at 21:00


Diltiazem HCl (Cardizem Cd) 240 mg DAILY PO  Last administered on 3/2/19at 


08:33; Admin Dose 240 MG;  Start 3/1/19 at 15:30


Lorazepam (Ativan) 0.5 mg Q6H  PRN PO ANXIETY Last administered on 3/1/19at 23:


13; Admin Dose 0.5 MG;  Start 3/1/19 at 15:30


Metoprolol Succinate (Toprol Xl) 100 mg DAILY PO  Last administered on 3/2/19at 


08:32; Admin Dose 100 MG;  Start 3/1/19 at 15:30


Pantoprazole (Protonix Tab) 40 mg DAILY PO  Last administered on 3/2/19at 08:32;


Admin Dose 40 MG;  Start 3/1/19 at 15:30


Simethicone (Mylicon) 80 mg Q6H  PRN PO DISTENSION/GAS/BLOATING Last 


administered on 3/2/19at 04:32; Admin Dose 80 MG;  Start 3/1/19 at 15:30


Sucralfate (Carafate) 1 gm BID PO  Last administered on 3/2/19at 08:32; Admin 


Dose 1 GM;  Start 3/1/19 at 21:00


Atorvastatin Calcium (Lipitor) 40 mg HS PO  Last administered on 3/1/19at 21:19;


Admin Dose 40 MG;  Start 3/1/19 at 21:00


IV Flush (NS 3 ml) 3 ml PER PROTOCOL IV ;  Start 3/1/19 at 16:00


Ondansetron HCl (Zofran Inj) 4 mg Q6H  PRN IV NAUSEA/VOMITING Last administered 


on 3/1/19at 23:04; Admin Dose 4 MG;  Start 3/1/19 at 16:00


Acetaminophen (Tylenol Tab) 650 mg Q6H  PRN PO .PAIN 1-3 OR TEMP Last 


administered on 3/1/19at 23:08; Admin Dose 650 MG;  Start 3/1/19 at 16:00


Docusate Sodium (Colace) 100 mg Q12H  PRN PO .CONSTIPATION;  Start 3/1/19 at 16


:00


Zolpidem Tartrate (Ambien) 5 mg QHS  PRN PO .INSOMNIA;  Start 3/1/19 at 16:00


Morphine Sulfate (morphine) 4 mg Q4H  PRN IV SEVERE PAIN LEVEL 7-10 Last 


administered on 3/2/19at 04:38; Admin Dose 4 MG;  Start 3/2/19 at 00:01


Dicyclomine HCl (Bentyl) 10 mg TID  PRN PO ABDOMINAL PAIN;  Start 3/2/19 at 


05:42


Clonidine (Catapres) 0.1 mg Q4H  PRN PO ELEVATED BLOOD PRESSURE Last 


administered on 3/2/19at 06:03; Admin Dose 0.1 MG;  Start 3/2/19 at 05:43


Allergies:  


Coded Allergies:  


     No Known Allergies (Verified  Allergy, Unknown, 3/1/19)





Family History


Significant Family History:  other (no sig cad)





Social History


Alcohol Use:  sober (x 2 mo )


Smoking Status:  Former smoker


Drug Use:  none





Exam/Review of Systems


Exam


Vitals





Vital Signs


  Date      Temp  Pulse  Resp  B/P (MAP)   Pulse Ox  O2          O2 Flow    FiO2


Time                                                 Delivery    Rate


    3/2/19  98.0     59    18      155/74        97  Nasal


     11:08                          (101)            Cannula


    3/2/19                                                             2.0


     08:37








Intake and Output





3/1/19


3/1/19


3/2/19





1414:59


22:59


06:59





IntakeIntake Total


240 ml


500 ml





BalanceBalance


240 ml


500 ml











Constitutional:  alert, oriented


Psych:  no complaints


Head:  normocephalic, atraumatic


Eyes:  nl conjunctiva


ENMT:  nl external ears & nose, nl lips & teeth, nl nasal mucosa & septum


Neck:  supple, non-tender, jvd (+)


Respiratory:  clear to auscultation, normal air movement


Cardiovascular:  regular rate and rhythm, nl pulses, systolic murmur; 


   No irregular rhythm, No S3, No S4


Gastrointestinal:  soft, non-tender


Musculoskeletal:  nl extremities to inspection, nl gait and stance; 


   No swelling


Extremities:  normal pulses


Neurological:  CNS II-XII intact, nl mental status, nl speech


Skin:  nl turgor





Results


Result Diagram:  


3/2/19 0752                                                                     


          3/2/19 0752





Results 24hrs





Laboratory Tests


 Test
                                3/1/19
18:27  3/2/19
00:33  3/2/19
07:52


 Creatine Kinase                              48            53


 Creatine Kinase Index                       2.8           2.8


 Creatinine Kinase MB (Mass)                1.35          1.46


 Troponin I                           < 0.012            0.015


 White Blood Count                                                     2.5  #L


 Red Blood Count                                                       4.47  L


 Hemoglobin                                                            12.9  L


 Hematocrit                                                            40.3  L


 Mean Corpuscular Volume                                                90.2


 Mean Corpuscular Hemoglobin                                           28.9  L


 Mean Corpuscular Hemoglobin
Concent  
             
                  32.0  



 Red Cell Distribution Width                                           15.4  H


 Platelet Count                                                        100  #L


 Mean Platelet Volume                                                  10.9  H


 Immature Granulocytes %                                              0.800  H


 Neutrophils %                                                         80.4  H


 Lymphocytes %                                                          7.6  L


 Monocytes %                                                            10.8


 Eosinophils %                                                           0.0


 Basophils %                                                             0.4


 Nucleated Red Blood Cells %                                             0.0


 Immature Granulocytes #                                               0.020


 Neutrophils #                                                           2.0


 Lymphocytes #                                                          0.2  L


 Monocytes #                                                             0.3


 Eosinophils #                                                           0.0


 Basophils #                                                             0.0


 Nucleated Red Blood Cells #                                             0.0


 Sodium Level                                                            141


 Potassium Level                                                         4.1


 Chloride Level                                                          106


 Carbon Dioxide Level                                                     25


 Anion Gap                                                                10


 Blood Urea Nitrogen                                                      12


 Creatinine                                                            0.59  L


 Est Glomerular Filtrat Rate
mL/min   
             
               



 Glucose Level                                                          122  #


 Calcium Level                                                           8.8


 Magnesium Level                                                         2.0


 Total Bilirubin                                                        1.5  H


 Direct Bilirubin                                                       0.00


 Indirect Bilirubin                                                     1.5  H


 Aspartate Amino Transf
(AST/SGOT)    
             
                   49  H



 Alanine Aminotransferase
(ALT/SGPT)  
             
                    26  



 Alkaline Phosphatase                                                     76


 Total Protein                                                           7.5


 Albumin                                                                 4.0


 Globulin                                                              3.50  H


 Albumin/Globulin Ratio                                                 1.14


 Triglycerides Level                                                      53


 Cholesterol Level                                                       184


 LDL Cholesterol, Calculated                                             109


 HDL Cholesterol                                                          64


 Cholesterol/HDL Ratio                                                   2.8


 Free Thyroxine Index                                                   2.84


 Thyroxine (T4)                                                          7.9


 Triiodothyronine (T3) Uptake                                           35.9








Imaging


Imaging


EKG images reviewed nsr, borderline LAD, , IVCD - left bundle pattern, no


ischemic ST changes


cxr report reviewed in emr





Medications


Medication





Current Medications


Ondansetron HCl (Zofran Inj) 4 mg ER BRIDGE PRN IV NAUSEA/VOMITING;  Start 


3/1/19 at 13:30;  Stop 3/2/19 at 13:29


Acetaminophen (Tylenol Tab) 650 mg ER BRIDGE PRN PO .MILD PAIN 1-3 OR TEMP;  


Start 3/1/19 at 13:30;  Stop 3/2/19 at 13:29


Apixaban (Eliquis) 5 mg BID PO  Last administered on 3/2/19at 08:32; Admin Dose 


5 MG;  Start 3/1/19 at 21:00


Aspirin (Halfprin) 81 mg DAILY PO  Last administered on 3/2/19at 08:33; Admin 


Dose 81 MG;  Start 3/2/19 at 09:00


Buspirone HCl (Buspar) 5 mg BID PO  Last administered on 3/2/19at 08:32; Admin 


Dose 5 MG;  Start 3/1/19 at 21:00


Diltiazem HCl (Cardizem Cd) 240 mg DAILY PO  Last administered on 3/2/19at 


08:33; Admin Dose 240 MG;  Start 3/1/19 at 15:30


Lorazepam (Ativan) 0.5 mg Q6H  PRN PO ANXIETY Last administered on 3/1/19at 


23:13; Admin Dose 0.5 MG;  Start 3/1/19 at 15:30


Metoprolol Succinate (Toprol Xl) 100 mg DAILY PO  Last administered on 3/2/19at 


08:32; Admin Dose 100 MG;  Start 3/1/19 at 15:30


Pantoprazole (Protonix Tab) 40 mg DAILY PO  Last administered on 3/2/19at 08:32;


Admin Dose 40 MG;  Start 3/1/19 at 15:30


Simethicone (Mylicon) 80 mg Q6H  PRN PO DISTENSION/GAS/BLOATING Last 


administered on 3/2/19at 04:32; Admin Dose 80 MG;  Start 3/1/19 at 15:30


Sucralfate (Carafate) 1 gm BID PO  Last administered on 3/2/19at 08:32; Admin 


Dose 1 GM;  Start 3/1/19 at 21:00


Atorvastatin Calcium (Lipitor) 40 mg HS PO  Last administered on 3/1/19at 21:19;


Admin Dose 40 MG;  Start 3/1/19 at 21:00


IV Flush (NS 3 ml) 3 ml PER PROTOCOL IV ;  Start 3/1/19 at 16:00


Ondansetron HCl (Zofran Inj) 4 mg Q6H  PRN IV NAUSEA/VOMITING Last administered 


on 3/1/19at 23:04; Admin Dose 4 MG;  Start 3/1/19 at 16:00


Acetaminophen (Tylenol Tab) 650 mg Q6H  PRN PO .PAIN 1-3 OR TEMP Last 


administered on 3/1/19at 23:08; Admin Dose 650 MG;  Start 3/1/19 at 16:00


Docusate Sodium (Colace) 100 mg Q12H  PRN PO .CONSTIPATION;  Start 3/1/19 at 


16:00


Zolpidem Tartrate (Ambien) 5 mg QHS  PRN PO .INSOMNIA;  Start 3/1/19 at 16:00


Morphine Sulfate (morphine) 4 mg Q4H  PRN IV SEVERE PAIN LEVEL 7-10 Last 


administered on 3/2/19at 04:38; Admin Dose 4 MG;  Start 3/2/19 at 00:01


Dicyclomine HCl (Bentyl) 10 mg TID  PRN PO ABDOMINAL PAIN;  Start 3/2/19 at 


05:42


Clonidine (Catapres) 0.1 mg Q4H  PRN PO ELEVATED BLOOD PRESSURE Last 


administered on 3/2/19at 06:03; Admin Dose 0.1 MG;  Start 3/2/19 at 05:43











STEVAN DURANT              Mar 2, 2019 13:05

## 2019-03-03 VITALS — DIASTOLIC BLOOD PRESSURE: 73 MMHG | RESPIRATION RATE: 20 BRPM | SYSTOLIC BLOOD PRESSURE: 138 MMHG | HEART RATE: 57 BPM

## 2019-03-03 VITALS — SYSTOLIC BLOOD PRESSURE: 113 MMHG | RESPIRATION RATE: 22 BRPM | HEART RATE: 54 BPM | DIASTOLIC BLOOD PRESSURE: 67 MMHG

## 2019-03-03 VITALS — SYSTOLIC BLOOD PRESSURE: 114 MMHG | DIASTOLIC BLOOD PRESSURE: 56 MMHG | HEART RATE: 55 BPM | RESPIRATION RATE: 22 BRPM

## 2019-03-03 VITALS — DIASTOLIC BLOOD PRESSURE: 67 MMHG | RESPIRATION RATE: 19 BRPM | HEART RATE: 60 BPM | SYSTOLIC BLOOD PRESSURE: 113 MMHG

## 2019-03-03 VITALS — HEART RATE: 57 BPM

## 2019-03-03 VITALS — HEART RATE: 62 BPM | DIASTOLIC BLOOD PRESSURE: 71 MMHG | RESPIRATION RATE: 20 BRPM | SYSTOLIC BLOOD PRESSURE: 154 MMHG

## 2019-03-03 VITALS — HEART RATE: 63 BPM | RESPIRATION RATE: 21 BRPM | SYSTOLIC BLOOD PRESSURE: 116 MMHG | DIASTOLIC BLOOD PRESSURE: 58 MMHG

## 2019-03-03 VITALS — HEART RATE: 74 BPM

## 2019-03-03 VITALS — HEART RATE: 53 BPM

## 2019-03-03 VITALS — HEART RATE: 66 BPM

## 2019-03-03 VITALS — HEART RATE: 54 BPM

## 2019-03-03 VITALS — HEART RATE: 55 BPM

## 2019-03-03 RX ADMIN — SUCRALFATE 1 GM: 1 TABLET ORAL at 20:23

## 2019-03-03 RX ADMIN — APIXABAN 1 MG: 5 TABLET, FILM COATED ORAL at 20:23

## 2019-03-03 RX ADMIN — SUCRALFATE SCH GM: 1 TABLET ORAL at 20:23

## 2019-03-03 RX ADMIN — METOPROLOL SUCCINATE SCH MG: 100 TABLET, FILM COATED, EXTENDED RELEASE ORAL at 12:26

## 2019-03-03 RX ADMIN — FUROSEMIDE 1 MG: 10 INJECTION, SOLUTION INTRAVENOUS at 10:18

## 2019-03-03 RX ADMIN — LORAZEPAM PRN MG: 0.5 TABLET ORAL at 23:03

## 2019-03-03 RX ADMIN — BUSPIRONE HYDROCHLORIDE SCH MG: 5 TABLET ORAL at 08:41

## 2019-03-03 RX ADMIN — DILTIAZEM HYDROCHLORIDE 1 MG: 240 CAPSULE, EXTENDED RELEASE ORAL at 08:41

## 2019-03-03 RX ADMIN — BUSPIRONE HYDROCHLORIDE 1 MG: 5 TABLET ORAL at 20:23

## 2019-03-03 RX ADMIN — SUCRALFATE SCH GM: 1 TABLET ORAL at 08:41

## 2019-03-03 RX ADMIN — BUSPIRONE HYDROCHLORIDE SCH MG: 5 TABLET ORAL at 20:23

## 2019-03-03 RX ADMIN — LORAZEPAM 1 MG: 0.5 TABLET ORAL at 23:03

## 2019-03-03 RX ADMIN — PANTOPRAZOLE SODIUM 1 MG: 40 TABLET, DELAYED RELEASE ORAL at 08:41

## 2019-03-03 RX ADMIN — APIXABAN SCH MG: 5 TABLET, FILM COATED ORAL at 08:41

## 2019-03-03 RX ADMIN — ATORVASTATIN CALCIUM SCH MG: 40 TABLET, FILM COATED ORAL at 20:23

## 2019-03-03 RX ADMIN — DILTIAZEM HYDROCHLORIDE SCH MG: 240 CAPSULE, EXTENDED RELEASE ORAL at 08:41

## 2019-03-03 RX ADMIN — APIXABAN 1 MG: 5 TABLET, FILM COATED ORAL at 08:41

## 2019-03-03 RX ADMIN — ASPIRIN SCH MG: 81 TABLET, COATED ORAL at 08:41

## 2019-03-03 RX ADMIN — METOPROLOL SUCCINATE 1 MG: 100 TABLET, EXTENDED RELEASE ORAL at 12:26

## 2019-03-03 RX ADMIN — BUSPIRONE HYDROCHLORIDE 1 MG: 5 TABLET ORAL at 08:41

## 2019-03-03 RX ADMIN — ASPIRIN 1 MG: 81 TABLET, COATED ORAL at 08:41

## 2019-03-03 RX ADMIN — PANTOPRAZOLE SODIUM SCH MG: 40 TABLET, DELAYED RELEASE ORAL at 08:41

## 2019-03-03 RX ADMIN — APIXABAN SCH MG: 5 TABLET, FILM COATED ORAL at 20:23

## 2019-03-03 RX ADMIN — SUCRALFATE 1 GM: 1 TABLET ORAL at 08:41

## 2019-03-03 RX ADMIN — ATORVASTATIN CALCIUM 1 MG: 40 TABLET, FILM COATED ORAL at 20:23

## 2019-03-03 NOTE — CONS
Assessment/Plan


Assessment/Plan


Hospital Course (Demo Recall)


1.Dyspnea on exertion- likely related to severe AS with HCM


- gentle diuresis 


- cont bp control, stable on current regimen 





2.  Atrial fibrillation. currently nsr, on eliquis for prophy. on metoprolol and


dilt





3. AS- severe on heart cath. awaiting outpt eval for TAVR/ETOH ablation vs. 


AVR/myomectomy. has outpt eval pending next week 





4. Hypertrophic cardiomyopathy- likely related to severe AS


- cont bp control, bb





5.  Hypertension


- elevated on admit will observe and titrate meds if needed 





6. Hyperlipidemia


- on statin.





7.CAD hx- stable, neg serial trop


- contstatin/bb





Consultation Date/Type/Reason


Admit Date/Time


Mar 1, 2019 at 13:09


Initial Consult Date


3/2/19


Type of Consult


Cardiology


Requesting Provider:  ASAD VAUGHN MD


Date/Time of Note


DATE: 3/3/19 


TIME: 09:58





24 HR Interval Summary


Free Text/Dictation


pt states felt better this am, no sob, but then worse after breakfast. no cp, 


palpitations, dizziness





tele reviewed nsr no afib





Detailed Summary


Eyes:  no complaints


ENT:  no complaints


Respiratory:  shortness of breath


Cardiovascular:  no complaints


Gastrointestinal:  no complaints





Exam/Review of Systems


Exam


Vitals





Vital Signs


  Date      Temp  Pulse  Resp  B/P (MAP)   Pulse Ox  O2          O2 Flow    FiO2


Time                                                 Delivery    Rate


    3/3/19                                           Nasal             2.0


     08:48                                           Cannula


    3/3/19           66


     08:00


    3/3/19  98.0           22      114/56        96


     07:26                           (75)








Intake and Output





3/2/19


3/2/19


3/3/19





1515:00


23:00


07:00





IntakeIntake Total


680 ml





BalanceBalance


680 ml











Exam


Constitutional:  alert, oriented


Psych:  no complaints


Head:  normocephalic, atraumatic


Eyes:  nl conjunctiva


ENMT:  nl external ears & nose, nl lips & teeth, nl nasal mucosa & septum


Neck:  supple, non-tender, jvd (+)


Respiratory:  clear to auscultation, normal air movement


Cardiovascular:  regular rate and rhythm, nl pulses, systolic murmur; 


   No irregular rhythm, No S3, No S4


Gastrointestinal:  soft, non-tender


Musculoskeletal:  nl extremities to inspection, nl gait and stance; 


   No swelling


Extremities:  normal pulses


Neurological:  CNS II-XII intact, nl mental status, nl speech


Skin:  nl turgor





Results


Result Diagram:  


3/2/19 0752                                                                     


          3/2/19 0752








Imaging


Imaging


cxr report reviewed in emr





Medications


Medication





Current Medications


Apixaban (Eliquis) 5 mg BID PO  Last administered on 3/3/19at 08:41; Admin Dose 


5 MG;  Start 3/1/19 at 21:00


Aspirin (Halfprin) 81 mg DAILY PO  Last administered on 3/3/19at 08:41; Admin 


Dose 81 MG;  Start 3/2/19 at 09:00


Buspirone HCl (Buspar) 5 mg BID PO  Last administered on 3/3/19at 08:41; Admin 


Dose 5 MG;  Start 3/1/19 at 21:00


Diltiazem HCl (Cardizem Cd) 240 mg DAILY PO  Last administered on 3/3/19at 


08:41; Admin Dose 240 MG;  Start 3/1/19 at 15:30


Lorazepam (Ativan) 0.5 mg Q6H  PRN PO ANXIETY Last administered on 3/1/19at 2


3:13; Admin Dose 0.5 MG;  Start 3/1/19 at 15:30


Metoprolol Succinate (Toprol Xl) 100 mg DAILY PO  Last administered on 3/2/19at 


08:32; Admin Dose 100 MG;  Start 3/1/19 at 15:30


Pantoprazole (Protonix Tab) 40 mg DAILY PO  Last administered on 3/3/19at 08:41;


Admin Dose 40 MG;  Start 3/1/19 at 15:30


Simethicone (Mylicon) 80 mg Q6H  PRN PO DISTENSION/GAS/BLOATING Last 


administered on 3/2/19at 04:32; Admin Dose 80 MG;  Start 3/1/19 at 15:30


Sucralfate (Carafate) 1 gm BID PO  Last administered on 3/3/19at 08:41; Admin 


Dose 1 GM;  Start 3/1/19 at 21:00


Atorvastatin Calcium (Lipitor) 40 mg HS PO  Last administered on 3/2/19at 20:34;


Admin Dose 40 MG;  Start 3/1/19 at 21:00


IV Flush (NS 3 ml) 3 ml PER PROTOCOL IV ;  Start 3/1/19 at 16:00


Ondansetron HCl (Zofran Inj) 4 mg Q6H  PRN IV NAUSEA/VOMITING Last administered 


on 3/2/19at 15:09; Admin Dose 4 MG;  Start 3/1/19 at 16:00


Acetaminophen (Tylenol Tab) 650 mg Q6H  PRN PO .PAIN 1-3 OR TEMP Last 


administered on 3/1/19at 23:08; Admin Dose 650 MG;  Start 3/1/19 at 16:00


Docusate Sodium (Colace) 100 mg Q12H  PRN PO .CONSTIPATION;  Start 3/1/19 at 


16:00


Zolpidem Tartrate (Ambien) 5 mg QHS  PRN PO .INSOMNIA;  Start 3/1/19 at 16:00


Morphine Sulfate (morphine) 4 mg Q4H  PRN IV SEVERE PAIN LEVEL 7-10 Last 


administered on 3/2/19at 04:38; Admin Dose 4 MG;  Start 3/2/19 at 00:01


Dicyclomine HCl (Bentyl) 10 mg TID  PRN PO ABDOMINAL PAIN;  Start 3/2/19 at 


05:42


Clonidine (Catapres) 0.1 mg Q4H  PRN PO ELEVATED BLOOD PRESSURE Last 


administered on 3/2/19at 06:03; Admin Dose 0.1 MG;  Start 3/2/19 at 05:43











STEVAN DURANT              Mar 3, 2019 10:00

## 2019-03-03 NOTE — PN
DATE:  03/03/2019

 

 

SUBJECTIVE:  The patient overall feels better.  The shortness of breath improved.

 

Dr. Lemus's cardiology consultation is greatly appreciated.

 

PHYSICAL EXAMINATION

GENERAL:  The patient is awake, alert.

VITAL SIGNS:  Temperature 98.0, blood pressure 113/67, O2 sats 98% on 2 liters nasal cannula.

HEENT:  JVD is not increased.

CHEST:  Decreased breath sounds at the bases.

HEART:  S1, S2 heard.  No definite gallops.

ABDOMEN:  Soft, nontender.  No hepatosplenomegaly.

EXTREMITIES:  No edema.

NEUROLOGIC:  No localizing or lateralizing signs.

 

LABORATORY DATA:  Sodium 141, potassium 4.1, BUN 12, creatinine 0.59 yesterday.  Magnesium was 2 yest
erday.

 

IMPRESSION:

1.  Dyspnea on exertion related to severe aortic stenosis with hypertrophic obstructive cardiomyopath
y.

2.  Atrial fibrillation, presently normal sinus rhythm.

3.  Hypertension.

4.  Hyperlipidemia.

5.  Chronic leukopenia.

6.  Gastroesophageal reflux disease.

 

PLAN:  We will continue diuretics per Dr. Lemus.  Recheck labs in a.m.  Increase activity and obse
rve him.

 

 

Dictated By: ASAD VAUGHN MD

 

SR/NTS

DD:    03/03/2019 16:15:52

DT:    03/03/2019 16:57:58

Conf#: [id number]

DID#:  [id number]

## 2019-03-04 VITALS — HEART RATE: 67 BPM | RESPIRATION RATE: 18 BRPM | DIASTOLIC BLOOD PRESSURE: 69 MMHG | SYSTOLIC BLOOD PRESSURE: 135 MMHG

## 2019-03-04 VITALS — HEART RATE: 68 BPM | DIASTOLIC BLOOD PRESSURE: 68 MMHG | SYSTOLIC BLOOD PRESSURE: 107 MMHG | RESPIRATION RATE: 18 BRPM

## 2019-03-04 VITALS — SYSTOLIC BLOOD PRESSURE: 132 MMHG | DIASTOLIC BLOOD PRESSURE: 83 MMHG | HEART RATE: 71 BPM | RESPIRATION RATE: 18 BRPM

## 2019-03-04 VITALS — HEART RATE: 71 BPM

## 2019-03-04 VITALS — HEART RATE: 65 BPM

## 2019-03-04 VITALS — HEART RATE: 70 BPM | SYSTOLIC BLOOD PRESSURE: 124 MMHG | DIASTOLIC BLOOD PRESSURE: 68 MMHG | RESPIRATION RATE: 18 BRPM

## 2019-03-04 VITALS — HEART RATE: 67 BPM | RESPIRATION RATE: 18 BRPM | DIASTOLIC BLOOD PRESSURE: 70 MMHG | SYSTOLIC BLOOD PRESSURE: 136 MMHG

## 2019-03-04 VITALS — HEART RATE: 63 BPM | SYSTOLIC BLOOD PRESSURE: 130 MMHG | RESPIRATION RATE: 20 BRPM | DIASTOLIC BLOOD PRESSURE: 75 MMHG

## 2019-03-04 VITALS — HEART RATE: 63 BPM

## 2019-03-04 VITALS — HEART RATE: 64 BPM

## 2019-03-04 VITALS — HEART RATE: 66 BPM

## 2019-03-04 LAB
ABNORMAL IP MESSAGE: 1
ADD MAN DIFF?: NO
AMMONIA: 15 UMOL/L (ref 9–30)
ANION GAP: 12 (ref 5–13)
BASOPHIL #: 0 10^3/UL (ref 0–0.1)
BASOPHILS %: 0.3 % (ref 0–2)
BLOOD UREA NITROGEN: 19 MG/DL (ref 7–20)
CALCIUM: 8.9 MG/DL (ref 8.4–10.2)
CARBON DIOXIDE: 32 MMOL/L (ref 21–31)
CHLORIDE: 99 MMOL/L (ref 97–110)
CREATININE: 0.79 MG/DL (ref 0.61–1.24)
EOSINOPHILS #: 0 10^3/UL (ref 0–0.5)
EOSINOPHILS %: 0 % (ref 0–7)
GLUCOSE: 97 MG/DL (ref 70–220)
HEMATOCRIT: 41.1 % (ref 42–52)
HEMOGLOBIN: 13.1 G/DL (ref 14–18)
IMMATURE GRANS #M: 0.02 10^3/UL (ref 0–0.03)
IMMATURE GRANS % (M): 0.7 % (ref 0–0.43)
LYMPHOCYTES #: 0.4 10^3/UL (ref 0.8–2.9)
LYMPHOCYTES %: 11.5 % (ref 15–51)
MEAN CORPUSCULAR HEMOGLOBIN: 29 PG (ref 29–33)
MEAN CORPUSCULAR HGB CONC: 31.9 G/DL (ref 32–37)
MEAN CORPUSCULAR VOLUME: 91.1 FL (ref 82–101)
MEAN PLATELET VOLUME: 11.7 FL (ref 7.4–10.4)
MONOCYTE #: 0.4 10^3/UL (ref 0.3–0.9)
MONOCYTES %: 13.8 % (ref 0–11)
NEUTROPHIL #: 2.3 10^3/UL (ref 1.6–7.5)
NEUTROPHILS %: 73.7 % (ref 39–77)
NUCLEATED RED BLOOD CELLS #: 0 10^3/UL (ref 0–0)
NUCLEATED RED BLOOD CELLS%: 0 /100WBC (ref 0–0)
PLATELET COUNT: 99 10^3/UL (ref 140–415)
POSITIVE DIFF: (no result)
POTASSIUM: 3.4 MMOL/L (ref 3.5–5.1)
RED BLOOD COUNT: 4.51 10^6/UL (ref 4.7–6.1)
RED CELL DISTRIBUTION WIDTH: 15.5 % (ref 11.5–14.5)
SODIUM: 143 MMOL/L (ref 135–144)
UR MUCUS: (no result) /HPF
UR RBC: 21 /HPF (ref 0–5)
UR WBC: 4 /HPF (ref 0–5)
WHITE BLOOD COUNT: 3.1 10^3/UL (ref 4.8–10.8)

## 2019-03-04 RX ADMIN — ATORVASTATIN CALCIUM SCH MG: 40 TABLET, FILM COATED ORAL at 21:03

## 2019-03-04 RX ADMIN — SUCRALFATE 1 GM: 1 TABLET ORAL at 21:03

## 2019-03-04 RX ADMIN — ATORVASTATIN CALCIUM 1 MG: 40 TABLET, FILM COATED ORAL at 21:03

## 2019-03-04 RX ADMIN — BUSPIRONE HYDROCHLORIDE SCH MG: 5 TABLET ORAL at 08:27

## 2019-03-04 RX ADMIN — BUSPIRONE HYDROCHLORIDE SCH MG: 5 TABLET ORAL at 21:03

## 2019-03-04 RX ADMIN — ASPIRIN SCH MG: 81 TABLET, COATED ORAL at 08:27

## 2019-03-04 RX ADMIN — APIXABAN 1 MG: 5 TABLET, FILM COATED ORAL at 08:27

## 2019-03-04 RX ADMIN — DILTIAZEM HYDROCHLORIDE 1 MG: 240 CAPSULE, EXTENDED RELEASE ORAL at 08:28

## 2019-03-04 RX ADMIN — PANTOPRAZOLE SODIUM SCH MG: 40 TABLET, DELAYED RELEASE ORAL at 08:27

## 2019-03-04 RX ADMIN — APIXABAN SCH MG: 5 TABLET, FILM COATED ORAL at 08:27

## 2019-03-04 RX ADMIN — LORAZEPAM 1 MG: 2 INJECTION, SOLUTION INTRAMUSCULAR; INTRAVENOUS at 10:33

## 2019-03-04 RX ADMIN — ASPIRIN 1 MG: 81 TABLET, COATED ORAL at 08:27

## 2019-03-04 RX ADMIN — APIXABAN 1 MG: 5 TABLET, FILM COATED ORAL at 21:03

## 2019-03-04 RX ADMIN — POTASSIUM CHLORIDE 1 MEQ: 10 TABLET, EXTENDED RELEASE ORAL at 14:00

## 2019-03-04 RX ADMIN — BUSPIRONE HYDROCHLORIDE 1 MG: 5 TABLET ORAL at 21:03

## 2019-03-04 RX ADMIN — PANTOPRAZOLE SODIUM 1 MG: 40 TABLET, DELAYED RELEASE ORAL at 08:27

## 2019-03-04 RX ADMIN — APIXABAN SCH MG: 5 TABLET, FILM COATED ORAL at 21:03

## 2019-03-04 RX ADMIN — SUCRALFATE SCH GM: 1 TABLET ORAL at 08:27

## 2019-03-04 RX ADMIN — DILTIAZEM HYDROCHLORIDE SCH MG: 240 CAPSULE, EXTENDED RELEASE ORAL at 08:28

## 2019-03-04 RX ADMIN — SUCRALFATE 1 GM: 1 TABLET ORAL at 08:27

## 2019-03-04 RX ADMIN — ZOLPIDEM TARTRATE 1 MG: 5 TABLET, FILM COATED ORAL at 21:06

## 2019-03-04 RX ADMIN — SUCRALFATE SCH GM: 1 TABLET ORAL at 21:03

## 2019-03-04 RX ADMIN — BUSPIRONE HYDROCHLORIDE 1 MG: 5 TABLET ORAL at 08:27

## 2019-03-04 RX ADMIN — LORAZEPAM PRN MG: 2 INJECTION, SOLUTION INTRAMUSCULAR; INTRAVENOUS at 10:33

## 2019-03-04 RX ADMIN — METOPROLOL SUCCINATE 1 MG: 100 TABLET, EXTENDED RELEASE ORAL at 08:27

## 2019-03-04 RX ADMIN — METOPROLOL SUCCINATE SCH MG: 100 TABLET, FILM COATED, EXTENDED RELEASE ORAL at 08:27

## 2019-03-05 VITALS — DIASTOLIC BLOOD PRESSURE: 60 MMHG | HEART RATE: 57 BPM | RESPIRATION RATE: 22 BRPM | SYSTOLIC BLOOD PRESSURE: 113 MMHG

## 2019-03-05 VITALS — HEART RATE: 64 BPM

## 2019-03-05 VITALS — RESPIRATION RATE: 24 BRPM | SYSTOLIC BLOOD PRESSURE: 123 MMHG | HEART RATE: 62 BPM | DIASTOLIC BLOOD PRESSURE: 58 MMHG

## 2019-03-05 VITALS — RESPIRATION RATE: 20 BRPM | HEART RATE: 56 BPM | DIASTOLIC BLOOD PRESSURE: 66 MMHG | SYSTOLIC BLOOD PRESSURE: 127 MMHG

## 2019-03-05 VITALS — DIASTOLIC BLOOD PRESSURE: 66 MMHG | RESPIRATION RATE: 22 BRPM | HEART RATE: 69 BPM | SYSTOLIC BLOOD PRESSURE: 108 MMHG

## 2019-03-05 VITALS — HEART RATE: 57 BPM

## 2019-03-05 VITALS — HEART RATE: 66 BPM

## 2019-03-05 VITALS — HEART RATE: 55 BPM

## 2019-03-05 VITALS — HEART RATE: 63 BPM

## 2019-03-05 LAB
ABNORMAL IP MESSAGE: 1
ADD MAN DIFF?: NO
ALANINE AMINOTRANSFERASE: 32 IU/L (ref 13–69)
ALBUMIN/GLOBULIN RATIO: 1.02
ALBUMIN: 3.7 G/DL (ref 3.3–4.9)
ALKALINE PHOSPHATASE: 71 IU/L (ref 42–121)
ANION GAP: 9 (ref 5–13)
ASPARTATE AMINO TRANSFERASE: 71 IU/L (ref 15–46)
BASOPHIL #: 0 10^3/UL (ref 0–0.1)
BASOPHILS %: 0.4 % (ref 0–2)
BILIRUBIN,DIRECT: 0 MG/DL (ref 0–0.2)
BILIRUBIN,TOTAL: 1.3 MG/DL (ref 0.2–1.3)
BLOOD UREA NITROGEN: 20 MG/DL (ref 7–20)
CALCIUM: 9 MG/DL (ref 8.4–10.2)
CARBON DIOXIDE: 27 MMOL/L (ref 21–31)
CHLORIDE: 106 MMOL/L (ref 97–110)
CREATININE: 0.62 MG/DL (ref 0.61–1.24)
EOSINOPHILS #: 0 10^3/UL (ref 0–0.5)
EOSINOPHILS %: 0 % (ref 0–7)
GLOBULIN: 3.6 G/DL (ref 1.3–3.2)
GLUCOSE: 88 MG/DL (ref 70–220)
HEMATOCRIT: 40.9 % (ref 42–52)
HEMOGLOBIN: 13.3 G/DL (ref 14–18)
IMMATURE GRANS #M: 0.01 10^3/UL (ref 0–0.03)
IMMATURE GRANS % (M): 0.4 % (ref 0–0.43)
LYMPHOCYTES #: 0.4 10^3/UL (ref 0.8–2.9)
LYMPHOCYTES %: 14 % (ref 15–51)
MAGNESIUM: 2.1 MG/DL (ref 1.7–2.5)
MEAN CORPUSCULAR HEMOGLOBIN: 29 PG (ref 29–33)
MEAN CORPUSCULAR HGB CONC: 32.5 G/DL (ref 32–37)
MEAN CORPUSCULAR VOLUME: 89.3 FL (ref 82–101)
MEAN PLATELET VOLUME: 11.5 FL (ref 7.4–10.4)
MONOCYTE #: 0.5 10^3/UL (ref 0.3–0.9)
MONOCYTES %: 16.8 % (ref 0–11)
NEUTROPHIL #: 1.9 10^3/UL (ref 1.6–7.5)
NEUTROPHILS %: 68.4 % (ref 39–77)
NUCLEATED RED BLOOD CELLS #: 0 10^3/UL (ref 0–0)
NUCLEATED RED BLOOD CELLS%: 0 /100WBC (ref 0–0)
PLATELET COUNT: 90 10^3/UL (ref 140–415)
POSITIVE DIFF: (no result)
POTASSIUM: 3.2 MMOL/L (ref 3.5–5.1)
RED BLOOD COUNT: 4.58 10^6/UL (ref 4.7–6.1)
RED CELL DISTRIBUTION WIDTH: 15.9 % (ref 11.5–14.5)
SODIUM: 142 MMOL/L (ref 135–144)
TOTAL PROTEIN: 7.3 G/DL (ref 6.1–8.1)
WHITE BLOOD COUNT: 2.8 10^3/UL (ref 4.8–10.8)

## 2019-03-05 RX ADMIN — BUSPIRONE HYDROCHLORIDE SCH MG: 5 TABLET ORAL at 20:05

## 2019-03-05 RX ADMIN — APIXABAN SCH MG: 5 TABLET, FILM COATED ORAL at 20:05

## 2019-03-05 RX ADMIN — POTASSIUM CHLORIDE 1 MEQ: 1500 TABLET, EXTENDED RELEASE ORAL at 10:01

## 2019-03-05 RX ADMIN — BUSPIRONE HYDROCHLORIDE SCH MG: 5 TABLET ORAL at 08:28

## 2019-03-05 RX ADMIN — CEFTRIAXONE 1 MLS/HR: 1 INJECTION, SOLUTION INTRAVENOUS at 09:58

## 2019-03-05 RX ADMIN — ATORVASTATIN CALCIUM 1 MG: 40 TABLET, FILM COATED ORAL at 20:05

## 2019-03-05 RX ADMIN — METOPROLOL SUCCINATE SCH MG: 100 TABLET, FILM COATED, EXTENDED RELEASE ORAL at 08:29

## 2019-03-05 RX ADMIN — ATORVASTATIN CALCIUM SCH MG: 40 TABLET, FILM COATED ORAL at 20:05

## 2019-03-05 RX ADMIN — POTASSIUM CHLORIDE SCH MEQ: 20 TABLET, EXTENDED RELEASE ORAL at 10:01

## 2019-03-05 RX ADMIN — PANTOPRAZOLE SODIUM 1 MG: 40 TABLET, DELAYED RELEASE ORAL at 08:28

## 2019-03-05 RX ADMIN — PANTOPRAZOLE SODIUM SCH MG: 40 TABLET, DELAYED RELEASE ORAL at 08:28

## 2019-03-05 RX ADMIN — DILTIAZEM HYDROCHLORIDE SCH MG: 240 CAPSULE, EXTENDED RELEASE ORAL at 08:29

## 2019-03-05 RX ADMIN — ASPIRIN 1 MG: 81 TABLET, COATED ORAL at 08:28

## 2019-03-05 RX ADMIN — ASPIRIN SCH MG: 81 TABLET, COATED ORAL at 08:28

## 2019-03-05 RX ADMIN — LORAZEPAM PRN MG: 2 INJECTION, SOLUTION INTRAMUSCULAR; INTRAVENOUS at 01:10

## 2019-03-05 RX ADMIN — SUCRALFATE SCH GM: 1 TABLET ORAL at 20:05

## 2019-03-05 RX ADMIN — BUSPIRONE HYDROCHLORIDE 1 MG: 5 TABLET ORAL at 20:05

## 2019-03-05 RX ADMIN — DILTIAZEM HYDROCHLORIDE 1 MG: 240 CAPSULE, EXTENDED RELEASE ORAL at 08:29

## 2019-03-05 RX ADMIN — CEFTRIAXONE SCH MLS/HR: 1 INJECTION, SOLUTION INTRAVENOUS at 09:58

## 2019-03-05 RX ADMIN — APIXABAN SCH MG: 5 TABLET, FILM COATED ORAL at 08:28

## 2019-03-05 RX ADMIN — SUCRALFATE 1 GM: 1 TABLET ORAL at 08:29

## 2019-03-05 RX ADMIN — LORAZEPAM 1 MG: 2 INJECTION, SOLUTION INTRAMUSCULAR; INTRAVENOUS at 01:10

## 2019-03-05 RX ADMIN — SUCRALFATE SCH GM: 1 TABLET ORAL at 08:29

## 2019-03-05 RX ADMIN — APIXABAN 1 MG: 5 TABLET, FILM COATED ORAL at 20:05

## 2019-03-05 RX ADMIN — SUCRALFATE 1 GM: 1 TABLET ORAL at 20:05

## 2019-03-05 RX ADMIN — BUSPIRONE HYDROCHLORIDE 1 MG: 5 TABLET ORAL at 08:28

## 2019-03-05 RX ADMIN — APIXABAN 1 MG: 5 TABLET, FILM COATED ORAL at 08:28

## 2019-03-05 RX ADMIN — METOPROLOL SUCCINATE 1 MG: 100 TABLET, EXTENDED RELEASE ORAL at 08:29

## 2019-03-05 NOTE — CONS
Assessment/Plan


Assessment/Plan


Hospital Course (Demo Recall)


1.Dyspnea on exertion- likely related to severe AS with HCM


- hold diurerixa


- cont bp control, stable on current regimen 





2.  Atrial fibrillation. currently nsr, on eliquis for prophy. on metoprolol and


dilt





3. AS- severe on heart cath. awaiting outpt eval for TAVR/ETOH ablation vs. 


AVR/myomectomy. has outpt eval pending next week 





4. Hypertrophic cardiomyopathy- likely related to severe AS


- cont bp control, bb





5.  Hypertension


- elevated on admit will observe and titrate meds if needed p


p


6. Hyperlipidemia


- on statin.





7.CAD hx- stable, neg serial trop


- contstatin/bb





ams  improving hct neg





Consultation Date/Type/Reason


Admit Date/Time


Mar 4, 2019 at 13:09


Initial Consult Date


3/2/19


Type of Consult


Cardiology


Requesting Provider:  ASAD VAUGHN MD


Date/Time of Note


DATE: 3/5/19 


TIME: 23:18





Exam/Review of Systems


Exam


Vitals





Vital Signs


  Date      Temp  Pulse  Resp  B/P (MAP)   Pulse Ox  O2          O2 Flow    FiO2


Time                                                 Delivery    Rate


    3/5/19           55


     20:28


    3/5/19  98.6           20      127/66        96  Room Air


     19:29                           (86)


    3/5/19                                                             2.0


     07:40








Intake and Output





3/4/19


3/4/19


3/5/19





1515:00


23:00


07:00





IntakeIntake Total


640 ml


700 ml





OutputOutput Total


200 ml





BalanceBalance


440 ml


700 ml














Results


Result Diagram:  


3/5/19 0547                                                                     


          3/5/19 0547





Results 24hrs





Laboratory Tests


        Test
                                3/4/19
23:45  3/5/19
05:47


        Urine Microscopic RBC                       21  H


        Urine Microscopic WBC                         4


        Urine Mucus                          MANY  A


        White Blood Count                                        2.8  L


        Red Blood Count                                         4.58  L


        Hemoglobin                                              13.3  L


        Hematocrit                                              40.9  L


        Mean Corpuscular Volume                                  89.3


        Mean Corpuscular Hemoglobin                              29.0


        Mean Corpuscular Hemoglobin
Concent  
                  32.5  



        Red Cell Distribution Width                             15.9  H


        Platelet Count                                            90  L


        Mean Platelet Volume                                    11.5  H


        Immature Granulocytes %                                 0.400


        Neutrophils %                                            68.4


        Lymphocytes %                                           14.0  L


        Monocytes %                                             16.8  H


        Eosinophils %                                             0.0


        Basophils %                                               0.4


        Nucleated Red Blood Cells %                               0.0


        Immature Granulocytes #                                 0.010


        Neutrophils #                                             1.9


        Lymphocytes #                                            0.4  L


        Monocytes #                                               0.5


        Eosinophils #                                             0.0


        Basophils #                                               0.0


        Nucleated Red Blood Cells #                               0.0


        Sodium Level                                              142


        Potassium Level                                          3.2  L


        Chloride Level                                            106


        Carbon Dioxide Level                                       27


        Anion Gap                                                   9


        Blood Urea Nitrogen                                        20


        Creatinine                                               0.62


        Est Glomerular Filtrat Rate
mL/min   
               



        Glucose Level                                              88


        Calcium Level                                             9.0


        Magnesium Level                                           2.1


        Total Bilirubin                                           1.3


        Direct Bilirubin                                         0.00


        Indirect Bilirubin                                       1.3  H


        Aspartate Amino Transf
(AST/SGOT)    
                   71  H



        Alanine Aminotransferase
(ALT/SGPT)  
                    32  



        Alkaline Phosphatase                                       71


        Total Protein                                             7.3


        Albumin                                                   3.7


        Globulin                                                3.60  H


        Albumin/Globulin Ratio                                   1.02








Medications


Medication





Current Medications


Apixaban (Eliquis) 5 mg BID PO  Last administered on 3/5/19at 20:05; Admin Dose 


5 MG;  Start 3/1/19 at 21:00


Aspirin (Halfprin) 81 mg DAILY PO  Last administered on 3/5/19at 08:28; Admin 


Dose 81 MG;  Start 3/2/19 at 09:00


Buspirone HCl (Buspar) 5 mg BID PO  Last administered on 3/5/19at 20:05; Admin 


Dose 5 MG;  Start 3/1/19 at 21:00


Diltiazem HCl (Cardizem Cd) 240 mg DAILY PO  Last administered on 3/5/19at 


08:29; Admin Dose 240 MG;  Start 3/1/19 at 15:30


Lorazepam (Ativan) 0.5 mg Q6H  PRN PO ANXIETY Last administered on 3/3/19at 


23:03; Admin Dose 0.5 MG;  Start 3/1/19 at 15:30


Metoprolol Succinate (Toprol Xl) 100 mg DAILY PO  Last administered on 3/5/19at 


08:29; Admin Dose 100 MG;  Start 3/1/19 at 15:30


Pantoprazole (Protonix Tab) 40 mg DAILY PO  Last administered on 3/5/19at 08:28;


Admin Dose 40 MG;  Start 3/1/19 at 15:30


Simethicone (Mylicon) 80 mg Q6H  PRN PO DISTENSION/GAS/BLOATING Last 


administered on 3/2/19at 04:32; Admin Dose 80 MG;  Start 3/1/19 at 15:30


Sucralfate (Carafate) 1 gm BID PO  Last administered on 3/5/19at 20:05; Admin 


Dose 1 GM;  Start 3/1/19 at 21:00


Atorvastatin Calcium (Lipitor) 40 mg HS PO  Last administered on 3/5/19at 20:05;


Admin Dose 40 MG;  Start 3/1/19 at 21:00


IV Flush (NS 3 ml) 3 ml PER PROTOCOL IV ;  Start 3/1/19 at 16:00


Ondansetron HCl (Zofran Inj) 4 mg Q6H  PRN IV NAUSEA/VOMITING Last administered 


on 3/2/19at 15:09; Admin Dose 4 MG;  Start 3/1/19 at 16:00


Acetaminophen (Tylenol Tab) 650 mg Q6H  PRN PO .PAIN 1-3 OR TEMP Last 


administered on 3/1/19at 23:08; Admin Dose 650 MG;  Start 3/1/19 at 16:00


Docusate Sodium (Colace) 100 mg Q12H  PRN PO .CONSTIPATION;  Start 3/1/19 at 


16:00


Zolpidem Tartrate (Ambien) 5 mg QHS  PRN PO .INSOMNIA Last administered on 


3/4/19at 21:06; Admin Dose 5 MG;  Start 3/1/19 at 16:00


Morphine Sulfate (morphine) 4 mg Q4H  PRN IV SEVERE PAIN LEVEL 7-10 Last 


administered on 3/2/19at 04:38; Admin Dose 4 MG;  Start 3/2/19 at 00:01


Dicyclomine HCl (Bentyl) 10 mg TID  PRN PO ABDOMINAL PAIN;  Start 3/2/19 at 


05:42


Clonidine (Catapres) 0.1 mg Q4H  PRN PO ELEVATED BLOOD PRESSURE Last 


administered on 3/2/19at 06:03; Admin Dose 0.1 MG;  Start 3/2/19 at 05:43


Lorazepam (Ativan) 0.5 mg Q4H  PRN IV mild trembling and anxiety Last 


administered on 3/5/19at 01:10; Admin Dose 0.5 MG;  Start 3/4/19 at 10:00


Ceftriaxone Sodium 50 ml @  100 mls/hr Q24H IVPB  Last administered on 3/5/19at 


09:58; Admin Dose 100 MLS/HR;  Start 3/5/19 at 09:30


Potassium Chloride (Klor-Con 20) 40 meq DAILY PO  Last administered on 3/5/19at 


10:01; Admin Dose 40 MEQ;  Start 3/5/19 at 09:30











STEVAN DURANT              Mar 5, 2019 23:20

## 2019-03-05 NOTE — PN
DATE:  03/05/2019

 

 

SUBJECTIVE:  The patient is awake, alert, continues to be confused.  Denies any headaches, any focal 
weakness or numbness.

 

OBJECTIVE:

VITAL SIGNS:  Temperature 98.3, blood pressure 108/66, O2 saturation is 97% on room air.

HEENT:  Head is normocephalic.

NECK:  No signs of meningism.

CHEST:  Clinically clear.

HEART:  S1, S2.  No definite gallops.

EXTREMITIES:  No edema.

NEUROLOGIC:  The patient moves both upper and lower extremities well; oriented to place not time.

 

LABORATORY DATA:  Potassium 3.2.  Magnesium is 2.1.  WBC count is 2.8, hematocrit 40.9, platelet coun
t is 90,000.

 

DIAGNOSTIC DATA:  CT scan of the brain shows no significant change compared to CT from 07/27/2017, ol
d posterior cerebral artery, right distribution infarct.

 

MICROBIOLOGY:  Urinalysis shows 21 RBCs, 4 WBCs.

 

IMPRESSION:

1.  Encephalopathy, probably a combination of query urinary tract infection and I suspect the patient
 may be withdrawing as well, although he denies any intake of alcohol recently.

2.  Hypokalemia.

3.  Severe aortic stenosis.

4.  Hypertrophic obstructive cardiomyopathy.

5.  Hyperlipidemia.

6.  Chronic leukopenia.

7.  Gastroesophageal reflux disease.

 

Serum ammonia levels are normal.  The patient does not have any asterixis.  We will continue anxiolyt
ics.  Replace potassium.  Start the patient on ceftriaxone after obtaining urine cultures and observe
.

 

 

Dictated By: ASAD VAUGHN MD, SR/NTS

DD:    03/05/2019 13:22:20

DT:    03/05/2019 13:58:21

Conf#: [id number]

DID#:  [id number]

## 2019-03-05 NOTE — PN
DATE:  03/04/2019

 

 

SUBJECTIVE:  The patient has been confused with periods of agitation, apparently was quite tremulous 
and diaphoretic this morning.  When I questioned him repeatedly regarding any alcohol intake, he says
 he stopped drinking about a month ago which was confirmed with his wife as well.  There is no histor
y of head trauma recently.  Denies any fever or chills.  Denies any chest pain.

 

PHYSICAL EXAMINATION:

GENERAL:  The patient is awake, alert.  He attempted to walk out of the floor once.  He is not orient
ed to time.

VITAL SIGNS:  Temperature 98.0, blood pressure 132/83, O2 saturation 99% on room air.

HEENT:  Head is normocephalic.  No signs of external head trauma.

NECK:  No signs of meningism.

CHEST:  Decreased breath sounds at bases.

HEART:  S1, S2 heard.  No definite gallops.

EXTREMITIES:  No edema.

NEUROLOGIC:  The patient has strength 5/5 both upper and lower extremities.  Mild tremor of the upper
 extremities is evident.

 

LABORATORY DATA:  Potassium 3.4, BUN 19, creatinine 0.79.  WBC count 3.1, hematocrit 41.1, platelets 
_____.

 

IMPRESSION:

1.  Recent confusion, etiology undetermined, suspect withdrawal from alcohol.

2.  Severe aortic stenosis.

3.  Hypertrophic obstructive cardiomyopathy.

4.  Hyperlipidemia.

5.  Chronic leukopenia.

6.  Gastroesophageal reflux disease.

 

PLAN:  We will check serum ammonia level and magnesium level now.  Replace potassium levels.  Start t
he patient on anxiolytics namely lorazepam 0.5 mg q.4 hours p.r.n.  Fall precautions.  We will also r
equest sitter.  Also obtain CT of the brain without IV contrast.  Recheck labs in a.m.

 

 

Dictated By: ASAD VAUGHN MD, SR/NTS

DD:    03/04/2019 13:37:47

DT:    03/04/2019 14:26:48

Conf#: [id number]

DID#:  [id number]

## 2019-03-06 VITALS — DIASTOLIC BLOOD PRESSURE: 67 MMHG | RESPIRATION RATE: 18 BRPM | HEART RATE: 54 BPM | SYSTOLIC BLOOD PRESSURE: 125 MMHG

## 2019-03-06 VITALS — HEART RATE: 64 BPM | RESPIRATION RATE: 18 BRPM | DIASTOLIC BLOOD PRESSURE: 64 MMHG | SYSTOLIC BLOOD PRESSURE: 110 MMHG

## 2019-03-06 VITALS — DIASTOLIC BLOOD PRESSURE: 63 MMHG | HEART RATE: 57 BPM | RESPIRATION RATE: 18 BRPM | SYSTOLIC BLOOD PRESSURE: 133 MMHG

## 2019-03-06 VITALS — HEART RATE: 63 BPM | RESPIRATION RATE: 19 BRPM | DIASTOLIC BLOOD PRESSURE: 59 MMHG | SYSTOLIC BLOOD PRESSURE: 158 MMHG

## 2019-03-06 VITALS — SYSTOLIC BLOOD PRESSURE: 135 MMHG | HEART RATE: 60 BPM | DIASTOLIC BLOOD PRESSURE: 63 MMHG | RESPIRATION RATE: 18 BRPM

## 2019-03-06 VITALS — HEART RATE: 58 BPM

## 2019-03-06 VITALS — RESPIRATION RATE: 18 BRPM | DIASTOLIC BLOOD PRESSURE: 59 MMHG | SYSTOLIC BLOOD PRESSURE: 131 MMHG | HEART RATE: 64 BPM

## 2019-03-06 VITALS — HEART RATE: 68 BPM | RESPIRATION RATE: 18 BRPM | SYSTOLIC BLOOD PRESSURE: 117 MMHG | DIASTOLIC BLOOD PRESSURE: 65 MMHG

## 2019-03-06 VITALS — HEART RATE: 64 BPM

## 2019-03-06 VITALS — HEART RATE: 54 BPM

## 2019-03-06 VITALS — HEART RATE: 55 BPM

## 2019-03-06 LAB
ABNORMAL IP MESSAGE: 1
ADD MAN DIFF?: NO
ANION GAP: 8 (ref 5–13)
BASOPHIL #: 0 10^3/UL (ref 0–0.1)
BASOPHILS %: 0.4 % (ref 0–2)
BLOOD UREA NITROGEN: 19 MG/DL (ref 7–20)
CALCIUM: 8.9 MG/DL (ref 8.4–10.2)
CARBON DIOXIDE: 28 MMOL/L (ref 21–31)
CHLORIDE: 106 MMOL/L (ref 97–110)
CREATININE: 0.8 MG/DL (ref 0.61–1.24)
EOSINOPHILS #: 0 10^3/UL (ref 0–0.5)
EOSINOPHILS %: 0.4 % (ref 0–7)
GLUCOSE: 100 MG/DL (ref 70–220)
HEMATOCRIT: 39.2 % (ref 42–52)
HEMOGLOBIN: 12.4 G/DL (ref 14–18)
IMMATURE GRANS #M: 0.01 10^3/UL (ref 0–0.03)
IMMATURE GRANS % (M): 0.4 % (ref 0–0.43)
LYMPHOCYTES #: 0.3 10^3/UL (ref 0.8–2.9)
LYMPHOCYTES %: 12.4 % (ref 15–51)
MAGNESIUM: 2 MG/DL (ref 1.7–2.5)
MEAN CORPUSCULAR HEMOGLOBIN: 29.3 PG (ref 29–33)
MEAN CORPUSCULAR HGB CONC: 31.6 G/DL (ref 32–37)
MEAN CORPUSCULAR VOLUME: 92.7 FL (ref 82–101)
MEAN PLATELET VOLUME: 12.1 FL (ref 7.4–10.4)
MONOCYTE #: 0.4 10^3/UL (ref 0.3–0.9)
MONOCYTES %: 18.9 % (ref 0–11)
NEUTROPHIL #: 1.6 10^3/UL (ref 1.6–7.5)
NEUTROPHILS %: 67.5 % (ref 39–77)
NUCLEATED RED BLOOD CELLS #: 0 10^3/UL (ref 0–0)
NUCLEATED RED BLOOD CELLS%: 0 /100WBC (ref 0–0)
PLATELET COUNT: 90 10^3/UL (ref 140–415)
POSITIVE DIFF: (no result)
POTASSIUM: 3.9 MMOL/L (ref 3.5–5.1)
RED BLOOD COUNT: 4.23 10^6/UL (ref 4.7–6.1)
RED CELL DISTRIBUTION WIDTH: 15.9 % (ref 11.5–14.5)
SODIUM: 142 MMOL/L (ref 135–144)
WHITE BLOOD COUNT: 2.3 10^3/UL (ref 4.8–10.8)

## 2019-03-06 RX ADMIN — ASPIRIN SCH MG: 81 TABLET, COATED ORAL at 08:27

## 2019-03-06 RX ADMIN — APIXABAN 1 MG: 5 TABLET, FILM COATED ORAL at 08:26

## 2019-03-06 RX ADMIN — LORAZEPAM 1 MG: 0.5 TABLET ORAL at 23:46

## 2019-03-06 RX ADMIN — CEFTRIAXONE 1 MLS/HR: 1 INJECTION, SOLUTION INTRAVENOUS at 09:34

## 2019-03-06 RX ADMIN — APIXABAN SCH MG: 5 TABLET, FILM COATED ORAL at 20:59

## 2019-03-06 RX ADMIN — POTASSIUM CHLORIDE 1 MEQ: 1500 TABLET, EXTENDED RELEASE ORAL at 08:25

## 2019-03-06 RX ADMIN — PANTOPRAZOLE SODIUM 1 MG: 40 TABLET, DELAYED RELEASE ORAL at 08:26

## 2019-03-06 RX ADMIN — THIAMINE HCL TAB 100 MG SCH MG: 100 TAB at 14:59

## 2019-03-06 RX ADMIN — SUCRALFATE SCH GM: 1 TABLET ORAL at 20:59

## 2019-03-06 RX ADMIN — ATORVASTATIN CALCIUM 1 MG: 40 TABLET, FILM COATED ORAL at 20:59

## 2019-03-06 RX ADMIN — BUSPIRONE HYDROCHLORIDE SCH MG: 5 TABLET ORAL at 20:59

## 2019-03-06 RX ADMIN — ATORVASTATIN CALCIUM SCH MG: 40 TABLET, FILM COATED ORAL at 20:59

## 2019-03-06 RX ADMIN — DILTIAZEM HYDROCHLORIDE SCH MG: 240 CAPSULE, EXTENDED RELEASE ORAL at 08:26

## 2019-03-06 RX ADMIN — SUCRALFATE 1 GM: 1 TABLET ORAL at 20:59

## 2019-03-06 RX ADMIN — BUSPIRONE HYDROCHLORIDE 1 MG: 5 TABLET ORAL at 20:59

## 2019-03-06 RX ADMIN — ASPIRIN 1 MG: 81 TABLET, COATED ORAL at 08:27

## 2019-03-06 RX ADMIN — PANTOPRAZOLE SODIUM SCH MG: 40 TABLET, DELAYED RELEASE ORAL at 08:26

## 2019-03-06 RX ADMIN — CEFTRIAXONE SCH MLS/HR: 1 INJECTION, SOLUTION INTRAVENOUS at 09:34

## 2019-03-06 RX ADMIN — METOPROLOL SUCCINATE 1 MG: 100 TABLET, EXTENDED RELEASE ORAL at 08:26

## 2019-03-06 RX ADMIN — APIXABAN SCH MG: 5 TABLET, FILM COATED ORAL at 08:26

## 2019-03-06 RX ADMIN — THIAMINE HCL TAB 100 MG 1 MG: 100 TAB at 14:59

## 2019-03-06 RX ADMIN — SUCRALFATE SCH GM: 1 TABLET ORAL at 08:27

## 2019-03-06 RX ADMIN — BUSPIRONE HYDROCHLORIDE SCH MG: 5 TABLET ORAL at 08:26

## 2019-03-06 RX ADMIN — BUSPIRONE HYDROCHLORIDE 1 MG: 5 TABLET ORAL at 08:26

## 2019-03-06 RX ADMIN — APIXABAN 1 MG: 5 TABLET, FILM COATED ORAL at 20:59

## 2019-03-06 RX ADMIN — POTASSIUM CHLORIDE SCH MEQ: 20 TABLET, EXTENDED RELEASE ORAL at 08:25

## 2019-03-06 RX ADMIN — METOPROLOL SUCCINATE SCH MG: 100 TABLET, FILM COATED, EXTENDED RELEASE ORAL at 08:26

## 2019-03-06 RX ADMIN — DILTIAZEM HYDROCHLORIDE 1 MG: 240 CAPSULE, EXTENDED RELEASE ORAL at 08:26

## 2019-03-06 RX ADMIN — SUCRALFATE 1 GM: 1 TABLET ORAL at 08:27

## 2019-03-06 RX ADMIN — LORAZEPAM PRN MG: 0.5 TABLET ORAL at 23:46

## 2019-03-06 NOTE — CONS
Assessment/Plan


Assessment/Plan


Hospital Course (Demo Recall)


1.Dyspnea on exertion- likely related to severe AS with HCM


- stable off diuretics. avoid standing dose given HCM/AS risk for outflow 


obstruction 


- cont bp control, stable on current regimen 





2.  Atrial fibrillation. currently nsr, on eliquis for prophy. on metoprolol and


dilt





3. AS- severe on heart cath. awaiting outpt eval for TAVR/ETOH ablation vs. 


AVR/myomectomy. has outpt eval pending next week 





4. Hypertrophic cardiomyopathy- likely related to severe AS


- cont bp control, bb





5.  Hypertension


- elevated on admit, improved 





6. Hyperlipidemia


- on statin.





7.CAD hx- stable, neg serial trop


- contstatin/bb





ams  improving hct neg





Consultation Date/Type/Reason


Admit Date/Time


Mar 4, 2019 at 13:09


Initial Consult Date


3/2/19


Type of Consult


Cardiology


Requesting Provider:  ASAD VAUGHN MD


Date/Time of Note


DATE: 3/6/19 


TIME: 11:50





24 HR Interval Summary


Free Text/Dictation


no acute events. no cp/sob. confusion improved





tele reviewed nsr





Detailed Summary


Eyes:  no complaints


ENT:  no complaints


Respiratory:  no complaints


Cardiovascular:  no complaints


Gastrointestinal:  no complaints





Exam/Review of Systems


Exam


Vitals





Vital Signs


  Date      Temp  Pulse  Resp  B/P (MAP)   Pulse Ox  O2          O2 Flow    FiO2


Time                                                 Delivery    Rate


    3/6/19  98.6     68    18      117/65        98  Room Air


     11:33                           (82)


    3/5/19                                                             2.0


     07:40








Intake and Output





3/5/19


3/5/19


3/6/19





1515:00


23:00


07:00





IntakeIntake Total


800 ml


720 ml





BalanceBalance


800 ml


720 ml











Exam


Constitutional:  alert, oriented


Psych:  no complaints


Head:  normocephalic, atraumatic


Eyes:  nl conjunctiva


ENMT:  nl external ears & nose, nl lips & teeth, nl nasal mucosa & septum


Neck:  supple, non-tender, jvd (+)


Respiratory:  clear to auscultation, normal air movement


Cardiovascular:  regular rate and rhythm, nl pulses, systolic murmur; 


   No irregular rhythm, No S3, No S4


Gastrointestinal:  soft, non-tender


Musculoskeletal:  nl extremities to inspection, nl gait and stance; 


   No swelling


Extremities:  normal pulses


Neurological:  CNS II-XII intact, nl mental status, nl speech


Skin:  nl turgor





Results


Result Diagram:  


3/6/19 0623                                                                     


          3/6/19 0623





Results 24hrs





Laboratory Tests


               Test
                                3/6/19
06:23


               White Blood Count                          2.3  L


               Red Blood Count                           4.23  L


               Hemoglobin                                12.4  L


               Hematocrit                                39.2  L


               Mean Corpuscular Volume                    92.7


               Mean Corpuscular Hemoglobin                29.3


               Mean Corpuscular Hemoglobin
Concent      31.6  L



               Red Cell Distribution Width               15.9  H


               Platelet Count                              90  L


               Mean Platelet Volume                      12.1  H


               Immature Granulocytes %                   0.400


               Neutrophils %                              67.5


               Lymphocytes %                             12.4  L


               Monocytes %                               18.9  H


               Eosinophils %                               0.4


               Basophils %                                 0.4


               Nucleated Red Blood Cells %                 0.0


               Immature Granulocytes #                   0.010


               Neutrophils #                               1.6


               Lymphocytes #                              0.3  L


               Monocytes #                                 0.4


               Eosinophils #                               0.0


               Basophils #                                 0.0


               Nucleated Red Blood Cells #                 0.0


               Sodium Level                                142


               Potassium Level                             3.9


               Chloride Level                              106


               Carbon Dioxide Level                         28


               Anion Gap                                     8


               Blood Urea Nitrogen                          19


               Creatinine                                 0.80


               Est Glomerular Filtrat Rate
mL/min     



               Glucose Level                               100


               Calcium Level                               8.9


               Magnesium Level                             2.0








Medications


Medication





Current Medications


Apixaban (Eliquis) 5 mg BID PO  Last administered on 3/6/19at 08:26; Admin Dose 


5 MG;  Start 3/1/19 at 21:00


Aspirin (Halfprin) 81 mg DAILY PO  Last administered on 3/6/19at 08:27; Admin 


Dose 81 MG;  Start 3/2/19 at 09:00


Buspirone HCl (Buspar) 5 mg BID PO  Last administered on 3/6/19at 08:26; Admin 


Dose 5 MG;  Start 3/1/19 at 21:00


Diltiazem HCl (Cardizem Cd) 240 mg DAILY PO  Last administered on 3/6/19at 


08:26; Admin Dose 240 MG;  Start 3/1/19 at 15:30


Lorazepam (Ativan) 0.5 mg Q6H  PRN PO ANXIETY Last administered on 3/3/19at 


23:03; Admin Dose 0.5 MG;  Start 3/1/19 at 15:30


Metoprolol Succinate (Toprol Xl) 100 mg DAILY PO  Last administered on 3/6/19at 


08:26; Admin Dose 100 MG;  Start 3/1/19 at 15:30


Pantoprazole (Protonix Tab) 40 mg DAILY PO  Last administered on 3/6/19at 08:26;


Admin Dose 40 MG;  Start 3/1/19 at 15:30


Simethicone (Mylicon) 80 mg Q6H  PRN PO DISTENSION/GAS/BLOATING Last admi


nistered on 3/2/19at 04:32; Admin Dose 80 MG;  Start 3/1/19 at 15:30


Sucralfate (Carafate) 1 gm BID PO  Last administered on 3/6/19at 08:27; Admin 


Dose 1 GM;  Start 3/1/19 at 21:00


Atorvastatin Calcium (Lipitor) 40 mg HS PO  Last administered on 3/5/19at 20:05;


Admin Dose 40 MG;  Start 3/1/19 at 21:00


IV Flush (NS 3 ml) 3 ml PER PROTOCOL IV ;  Start 3/1/19 at 16:00


Ondansetron HCl (Zofran Inj) 4 mg Q6H  PRN IV NAUSEA/VOMITING Last administered 


on 3/2/19at 15:09; Admin Dose 4 MG;  Start 3/1/19 at 16:00


Acetaminophen (Tylenol Tab) 650 mg Q6H  PRN PO .PAIN 1-3 OR TEMP Last 


administered on 3/1/19at 23:08; Admin Dose 650 MG;  Start 3/1/19 at 16:00


Docusate Sodium (Colace) 100 mg Q12H  PRN PO .CONSTIPATION;  Start 3/1/19 at 


16:00


Zolpidem Tartrate (Ambien) 5 mg QHS  PRN PO .INSOMNIA Last administered on 


3/4/19at 21:06; Admin Dose 5 MG;  Start 3/1/19 at 16:00


Morphine Sulfate (morphine) 4 mg Q4H  PRN IV SEVERE PAIN LEVEL 7-10 Last 


administered on 3/2/19at 04:38; Admin Dose 4 MG;  Start 3/2/19 at 00:01


Dicyclomine HCl (Bentyl) 10 mg TID  PRN PO ABDOMINAL PAIN;  Start 3/2/19 at 


05:42


Clonidine (Catapres) 0.1 mg Q4H  PRN PO ELEVATED BLOOD PRESSURE Last 


administered on 3/2/19at 06:03; Admin Dose 0.1 MG;  Start 3/2/19 at 05:43


Lorazepam (Ativan) 0.5 mg Q4H  PRN IV mild trembling and anxiety Last 


administered on 3/5/19at 01:10; Admin Dose 0.5 MG;  Start 3/4/19 at 10:00


Ceftriaxone Sodium 50 ml @  100 mls/hr Q24H IVPB  Last administered on 3/6/19at 


09:34; Admin Dose 100 MLS/HR;  Start 3/5/19 at 09:30


Potassium Chloride (Klor-Con 20) 40 meq DAILY PO  Last administered on 3/6/19at 


08:25; Admin Dose 40 MEQ;  Start 3/5/19 at 09:30











STEVAN DURANT              Mar 6, 2019 11:51

## 2019-03-06 NOTE — PN
DATE:  03/06/2019

 

 

SUBJECTIVE:  The patient is awake, confused.  He denies any headaches.  He denies any nausea, vomitin
g.  He denies any fever or chills.

 

PHYSICAL EXAMINATION:  

VITAL SIGNS:  Temperature 98.6, blood pressure 117/65, O2 sat 98% on room air.  Rhythm is sinus rhyth
m.

NECK:  No neck stiffness.

CHEST:  Clinically clear.

HEART:  S1, S2 heard.  No rubs or gallops.

ABDOMEN:  Soft, obese, nontender.

EXTREMITIES:  No edema.  Mild tremor of the upper extremities noted.

 

IMPRESSION:

1.  Severe aortic stenosis.

2.  Encephalopathy, suspect ethanol withdrawal.

3.  Hypertrophic obstructive cardiomyopathy.

4.  Hyperlipidemia.

5.  Chronic leukopenia.

6.  Gastroesophageal reflux disease.

 

PLAN:  The patient cardiac-wise is stable.  We will transfer him to med/surg with a sitter and add th
iamine, continue lorazepam.  Recheck labs in the a.m.

 

 

Dictated By: ASAD VAUGHN MD, SR/RENE

DD:    03/06/2019 14:15:48

DT:    03/06/2019 17:04:39

Conf#: [id number]

DID#:  [id number]

## 2019-03-07 VITALS — RESPIRATION RATE: 18 BRPM | HEART RATE: 60 BPM | DIASTOLIC BLOOD PRESSURE: 77 MMHG | SYSTOLIC BLOOD PRESSURE: 127 MMHG

## 2019-03-07 VITALS — DIASTOLIC BLOOD PRESSURE: 66 MMHG | SYSTOLIC BLOOD PRESSURE: 111 MMHG | HEART RATE: 58 BPM | RESPIRATION RATE: 18 BRPM

## 2019-03-07 VITALS — RESPIRATION RATE: 18 BRPM | DIASTOLIC BLOOD PRESSURE: 89 MMHG | SYSTOLIC BLOOD PRESSURE: 122 MMHG | HEART RATE: 63 BPM

## 2019-03-07 VITALS — SYSTOLIC BLOOD PRESSURE: 129 MMHG | RESPIRATION RATE: 16 BRPM | HEART RATE: 67 BPM | DIASTOLIC BLOOD PRESSURE: 67 MMHG

## 2019-03-07 VITALS — RESPIRATION RATE: 18 BRPM | DIASTOLIC BLOOD PRESSURE: 55 MMHG | HEART RATE: 59 BPM | SYSTOLIC BLOOD PRESSURE: 111 MMHG

## 2019-03-07 LAB
ABNORMAL IP MESSAGE: 1
ADD MAN DIFF?: YES
ANION GAP: 7 (ref 5–13)
ANISOCYTOSIS: (no result) (ref 0–0)
BAND NEUTROPHILS #M: 0 10^3/UL (ref 0–0.6)
BAND NEUTROPHILS % (M): 4 % (ref 0–4)
BLOOD UREA NITROGEN: 13 MG/DL (ref 7–20)
CALCIUM: 8.8 MG/DL (ref 8.4–10.2)
CARBON DIOXIDE: 26 MMOL/L (ref 21–31)
CHLORIDE: 107 MMOL/L (ref 97–110)
CREATININE: 0.67 MG/DL (ref 0.61–1.24)
GIANT THROMBO% (M): 6 % (ref 0–0)
GLUCOSE: 85 MG/DL (ref 70–220)
HEMATOCRIT: 42.9 % (ref 42–52)
HEMOGLOBIN: 13 G/DL (ref 14–18)
IMMATURE GRANS #M: 0.01 10^3/UL (ref 0–0.03)
IMMATURE GRANS % (M): 0.5 % (ref 0–0.43)
LYMPHOCYTES #M: 0.4 10^3/UL (ref 0.8–2.9)
LYMPHOCYTES % (M): 20 % (ref 15–51)
MAGNESIUM: 2 MG/DL (ref 1.7–2.5)
MEAN CORPUSCULAR HEMOGLOBIN: 28.5 PG (ref 29–33)
MEAN CORPUSCULAR HGB CONC: 30.3 G/DL (ref 32–37)
MEAN CORPUSCULAR VOLUME: 94.1 FL (ref 82–101)
MEAN PLATELET VOLUME: 12 FL (ref 7.4–10.4)
MONOCYTE #M: 0.2 10^3/UL (ref 0.3–0.9)
MONOCYTES % (M): 14 % (ref 0–11)
NUCLEATED RED BLOOD CELLS%: 0 /100WBC (ref 0–0)
PLATELET COUNT: 89 10^3/UL (ref 140–415)
PLATELET ESTIMATE: (no result)
POSITIVE DIFF: (no result)
POTASSIUM: 4.4 MMOL/L (ref 3.5–5.1)
REACTIVE LYMPHOCYTES #M: 0 10^3/UL (ref 0–0)
REACTIVE LYMPHOCYTES% (M): 2 % (ref 0–0)
RED BLOOD COUNT: 4.56 10^6/UL (ref 4.7–6.1)
RED CELL DISTRIBUTION WIDTH: 15.8 % (ref 11.5–14.5)
SEG NEUT #M: 1.2 10^3/UL (ref 1.6–7.5)
SEGMENTED NEUTROPHILS (M) %: 60 % (ref 39–77)
SMUDGE%M: 32 % (ref 0–0)
SODIUM: 140 MMOL/L (ref 135–144)
WHITE BLOOD COUNT: 2 10^3/UL (ref 4.8–10.8)

## 2019-03-07 RX ADMIN — THIAMINE HCL TAB 100 MG 1 MG: 100 TAB at 08:46

## 2019-03-07 RX ADMIN — METOPROLOL SUCCINATE 1 MG: 100 TABLET, EXTENDED RELEASE ORAL at 10:13

## 2019-03-07 RX ADMIN — CEFTRIAXONE 1 MLS/HR: 1 INJECTION, SOLUTION INTRAVENOUS at 08:52

## 2019-03-07 RX ADMIN — BUSPIRONE HYDROCHLORIDE SCH MG: 5 TABLET ORAL at 20:40

## 2019-03-07 RX ADMIN — ATORVASTATIN CALCIUM 1 MG: 40 TABLET, FILM COATED ORAL at 20:40

## 2019-03-07 RX ADMIN — NEOMYCIN SULFATE, POLYMYXIN B SULFATE AND HYDROCORTISONE SCH DROP: 3.5; 10000; 1 SUSPENSION OPHTHALMIC at 20:41

## 2019-03-07 RX ADMIN — PANTOPRAZOLE SODIUM 1 MG: 40 TABLET, DELAYED RELEASE ORAL at 09:00

## 2019-03-07 RX ADMIN — DOCUSATE SODIUM 1 MG: 100 CAPSULE, LIQUID FILLED ORAL at 18:01

## 2019-03-07 RX ADMIN — APIXABAN SCH MG: 5 TABLET, FILM COATED ORAL at 08:46

## 2019-03-07 RX ADMIN — SUCRALFATE 1 GM: 1 TABLET ORAL at 08:46

## 2019-03-07 RX ADMIN — SUCRALFATE 1 GM: 1 TABLET ORAL at 20:40

## 2019-03-07 RX ADMIN — DILTIAZEM HYDROCHLORIDE SCH MG: 240 CAPSULE, EXTENDED RELEASE ORAL at 10:13

## 2019-03-07 RX ADMIN — POTASSIUM CHLORIDE SCH MEQ: 20 TABLET, EXTENDED RELEASE ORAL at 08:47

## 2019-03-07 RX ADMIN — SUCRALFATE SCH GM: 1 TABLET ORAL at 08:46

## 2019-03-07 RX ADMIN — SUCRALFATE SCH GM: 1 TABLET ORAL at 20:40

## 2019-03-07 RX ADMIN — PANTOPRAZOLE SODIUM SCH MG: 40 TABLET, DELAYED RELEASE ORAL at 09:00

## 2019-03-07 RX ADMIN — POTASSIUM CHLORIDE 1 MEQ: 1500 TABLET, EXTENDED RELEASE ORAL at 08:47

## 2019-03-07 RX ADMIN — NEOMYCIN SULFATE, POLYMYXIN B SULFATE AND HYDROCORTISONE 1 DROP: 3.5; 10000; 1 SUSPENSION OPHTHALMIC at 20:41

## 2019-03-07 RX ADMIN — ASPIRIN 1 MG: 81 TABLET, COATED ORAL at 08:46

## 2019-03-07 RX ADMIN — DILTIAZEM HYDROCHLORIDE 1 MG: 240 CAPSULE, EXTENDED RELEASE ORAL at 10:13

## 2019-03-07 RX ADMIN — BUSPIRONE HYDROCHLORIDE 1 MG: 5 TABLET ORAL at 20:40

## 2019-03-07 RX ADMIN — BUSPIRONE HYDROCHLORIDE 1 MG: 5 TABLET ORAL at 08:46

## 2019-03-07 RX ADMIN — APIXABAN 1 MG: 5 TABLET, FILM COATED ORAL at 08:46

## 2019-03-07 RX ADMIN — CEFTRIAXONE SCH MLS/HR: 1 INJECTION, SOLUTION INTRAVENOUS at 08:52

## 2019-03-07 RX ADMIN — APIXABAN 1 MG: 5 TABLET, FILM COATED ORAL at 20:40

## 2019-03-07 RX ADMIN — METOPROLOL SUCCINATE SCH MG: 100 TABLET, FILM COATED, EXTENDED RELEASE ORAL at 10:13

## 2019-03-07 RX ADMIN — ATORVASTATIN CALCIUM SCH MG: 40 TABLET, FILM COATED ORAL at 20:40

## 2019-03-07 RX ADMIN — THIAMINE HCL TAB 100 MG SCH MG: 100 TAB at 08:46

## 2019-03-07 RX ADMIN — APIXABAN SCH MG: 5 TABLET, FILM COATED ORAL at 20:40

## 2019-03-07 RX ADMIN — BUSPIRONE HYDROCHLORIDE SCH MG: 5 TABLET ORAL at 08:46

## 2019-03-07 RX ADMIN — NEOMYCIN SULFATE, POLYMYXIN B SULFATE AND HYDROCORTISONE SCH DROP: 3.5; 10000; 1 SUSPENSION OPHTHALMIC at 14:34

## 2019-03-07 RX ADMIN — ASPIRIN SCH MG: 81 TABLET, COATED ORAL at 08:46

## 2019-03-07 RX ADMIN — NEOMYCIN SULFATE, POLYMYXIN B SULFATE AND HYDROCORTISONE 1 DROP: 3.5; 10000; 1 SUSPENSION OPHTHALMIC at 14:34

## 2019-03-07 NOTE — PN
DATE:  03/07/2019

 

 

SUBJECTIVE:  The patient denies any chest pain or shortness of breath.  No abdominal discomfort.  The
 shortness of breath has significantly improved.  Denies any dysuria.

 

PHYSICAL EXAMINATION:

GENERAL:  The patient is awake, alert, oriented to time, still has bouts of confusion.  No auditory h
allucinations.

VITAL SIGNS:  Temperature 98.1, blood pressure 129/67, O2 saturation 100% on room air.

CHEST:  Clinically clear.

HEART:  S1, S2.  No definite gallops.

ABDOMEN:  Soft, nontender.  No hepatosplenomegaly.

EXTREMITIES:  No edema.

 

LABORATORY DATA:  WBC count is 2.0, hematocrit 42.9, platelet count 89,000.  Sodium 140, potassium 4.
4.

 

IMPRESSION:

1.  Encephalopathy, improving.

2.  Severe leukopenia with thrombocytopenia.

3.  Hyperlipidemia.

4.  Hypertrophic obstructive cardiomyopathy.

5.  Severe aortic stenosis.

6.  Gastroesophageal reflux disease.

 

PLAN:  We will switch IV lorazepam to p.o. lorazepam.  Increase activity.  Recheck labs in a.m.

 

 

Dictated By: ASAD VAUGHN MD, SR/RENE

DD:    03/07/2019 13:41:29

DT:    03/07/2019 13:53:24

Conf#: [id number]

DID#:  [id number]

## 2019-03-08 VITALS — SYSTOLIC BLOOD PRESSURE: 109 MMHG | HEART RATE: 60 BPM | RESPIRATION RATE: 16 BRPM | DIASTOLIC BLOOD PRESSURE: 58 MMHG

## 2019-03-08 VITALS — SYSTOLIC BLOOD PRESSURE: 114 MMHG | RESPIRATION RATE: 18 BRPM | DIASTOLIC BLOOD PRESSURE: 59 MMHG | HEART RATE: 58 BPM

## 2019-03-08 VITALS — HEART RATE: 67 BPM | RESPIRATION RATE: 18 BRPM | DIASTOLIC BLOOD PRESSURE: 69 MMHG | SYSTOLIC BLOOD PRESSURE: 111 MMHG

## 2019-03-08 LAB
ABNORMAL IP MESSAGE: 1
ADD MAN DIFF?: NO
ANION GAP: 7 (ref 5–13)
BASOPHIL #: 0 10^3/UL (ref 0–0.1)
BASOPHILS %: 0.4 % (ref 0–2)
BLOOD UREA NITROGEN: 11 MG/DL (ref 7–20)
CALCIUM: 8.8 MG/DL (ref 8.4–10.2)
CARBON DIOXIDE: 29 MMOL/L (ref 21–31)
CHLORIDE: 105 MMOL/L (ref 97–110)
CREATININE: 0.78 MG/DL (ref 0.61–1.24)
EOSINOPHILS #: 0 10^3/UL (ref 0–0.5)
EOSINOPHILS %: 0.4 % (ref 0–7)
GLUCOSE: 84 MG/DL (ref 70–220)
HEMATOCRIT: 40.1 % (ref 42–52)
HEMOGLOBIN: 12.9 G/DL (ref 14–18)
IMMATURE GRANS #M: 0.01 10^3/UL (ref 0–0.03)
IMMATURE GRANS % (M): 0.4 % (ref 0–0.43)
LYMPHOCYTES #: 0.4 10^3/UL (ref 0.8–2.9)
LYMPHOCYTES %: 15.4 % (ref 15–51)
MEAN CORPUSCULAR HEMOGLOBIN: 29.4 PG (ref 29–33)
MEAN CORPUSCULAR HGB CONC: 32.2 G/DL (ref 32–37)
MEAN CORPUSCULAR VOLUME: 91.3 FL (ref 82–101)
MEAN PLATELET VOLUME: 11.7 FL (ref 7.4–10.4)
MONOCYTE #: 0.5 10^3/UL (ref 0.3–0.9)
MONOCYTES %: 20.9 % (ref 0–11)
NEUTROPHIL #: 1.6 10^3/UL (ref 1.6–7.5)
NEUTROPHILS %: 62.5 % (ref 39–77)
NUCLEATED RED BLOOD CELLS #: 0 10^3/UL (ref 0–0)
NUCLEATED RED BLOOD CELLS%: 0 /100WBC (ref 0–0)
PLATELET COUNT: 105 10^3/UL (ref 140–415)
POSITIVE DIFF: (no result)
POTASSIUM: 4.2 MMOL/L (ref 3.5–5.1)
RED BLOOD COUNT: 4.39 10^6/UL (ref 4.7–6.1)
RED CELL DISTRIBUTION WIDTH: 16 % (ref 11.5–14.5)
SODIUM: 141 MMOL/L (ref 135–144)
WHITE BLOOD COUNT: 2.5 10^3/UL (ref 4.8–10.8)

## 2019-03-08 RX ADMIN — THIAMINE HCL TAB 100 MG 1 MG: 100 TAB at 08:28

## 2019-03-08 RX ADMIN — METOPROLOL SUCCINATE SCH MG: 100 TABLET, FILM COATED, EXTENDED RELEASE ORAL at 08:29

## 2019-03-08 RX ADMIN — SUCRALFATE 1 GM: 1 TABLET ORAL at 08:28

## 2019-03-08 RX ADMIN — ASPIRIN 1 MG: 81 TABLET, COATED ORAL at 08:28

## 2019-03-08 RX ADMIN — SUCRALFATE SCH GM: 1 TABLET ORAL at 08:28

## 2019-03-08 RX ADMIN — POTASSIUM CHLORIDE 1 MEQ: 1500 TABLET, EXTENDED RELEASE ORAL at 08:28

## 2019-03-08 RX ADMIN — APIXABAN SCH MG: 5 TABLET, FILM COATED ORAL at 08:28

## 2019-03-08 RX ADMIN — THIAMINE HCL TAB 100 MG SCH MG: 100 TAB at 08:28

## 2019-03-08 RX ADMIN — PANTOPRAZOLE SODIUM 1 MG: 40 TABLET, DELAYED RELEASE ORAL at 08:28

## 2019-03-08 RX ADMIN — DILTIAZEM HYDROCHLORIDE 1 MG: 240 CAPSULE, EXTENDED RELEASE ORAL at 08:28

## 2019-03-08 RX ADMIN — APIXABAN 1 MG: 5 TABLET, FILM COATED ORAL at 08:28

## 2019-03-08 RX ADMIN — ASPIRIN SCH MG: 81 TABLET, COATED ORAL at 08:28

## 2019-03-08 RX ADMIN — NEOMYCIN SULFATE, POLYMYXIN B SULFATE AND HYDROCORTISONE 1 DROP: 3.5; 10000; 1 SUSPENSION OPHTHALMIC at 08:28

## 2019-03-08 RX ADMIN — PANTOPRAZOLE SODIUM SCH MG: 40 TABLET, DELAYED RELEASE ORAL at 08:28

## 2019-03-08 RX ADMIN — BUSPIRONE HYDROCHLORIDE SCH MG: 5 TABLET ORAL at 08:28

## 2019-03-08 RX ADMIN — DILTIAZEM HYDROCHLORIDE SCH MG: 240 CAPSULE, EXTENDED RELEASE ORAL at 08:28

## 2019-03-08 RX ADMIN — NEOMYCIN SULFATE, POLYMYXIN B SULFATE AND HYDROCORTISONE SCH DROP: 3.5; 10000; 1 SUSPENSION OPHTHALMIC at 08:28

## 2019-03-08 RX ADMIN — BUSPIRONE HYDROCHLORIDE 1 MG: 5 TABLET ORAL at 08:28

## 2019-03-08 RX ADMIN — METOPROLOL SUCCINATE 1 MG: 100 TABLET, EXTENDED RELEASE ORAL at 08:29

## 2019-03-08 NOTE — DS
DATE OF ADMISSION: 03/04/2019

DATE OF DISCHARGE: 03/08/2019

 

FINAL DIAGNOSES:

1.  Atypical chest pain.  No evidence of myocardial infarction.

2.  Severe aortic stenosis.

3.  Hypertrophic obstructive cardiomyopathy.

4.  Coronary artery disease, status post PCI of LAD and RCA.

5.  Atrial fibrillation with acceptable ventricular response.

6.  Hypertension.

7.  Gastroesophageal reflux disease.

8.  Chronic leukopenia.

9.  Transient encephalopathy, resolved.

10.  Underlying anxiety disorder.

 

HOSPITAL COURSE:  The patient is a 78-year-old gentleman well known to me from previous followup, pre
senting with chest pressure, shortness of breath which had been getting progressively worse over angie
ral weeks.  The patient was evaluated in the emergency room and was admitted.

 

PHYSICAL EXAMINATION:

GENERAL:  The patient was found to be anxious.

VITAL SIGNS:  O2 sat is 100% on room air, temperature 97.3, blood pressure 151/70.

CHEST:  Revealed decreased breath sounds at bases.

HEART:  S1, S2.  No definite gallops.

ABDOMEN:  Obese.  No hepatosplenomegaly.

EXTREMITIES:  No edema.

 

The patient was maintained on anxiolytics and since he had dyspnea on exertion, it was felt to be rel
ated to severe aortic stenosis with hypertrophic cardiomyopathy and Dr. Lemus evaluated the patien
t who recommended gentle diuresis and intravenous Lasix with some improvement of his condition.  The 
patient was confused on 03/04/2019.  Denied any headaches, any head trauma.  Neurologically, he was o
riented to place, not time and was able to move both upper and lower extremities well.  There are no 
signs of meningism.  Metabolic status was normal.  CT scan showed an old posterior cerebral artery ri
ght distribution infarct unchanged from 07/27/2017.  There was concern of possible urinary tract infe
ction as he had few white cells in the urine.  The patient was started on ceftriaxone.  Urine culture
s came back negative.  Confusion gradually resolved and patient was discharged home in much improved 
condition.

 

The patient will be continued on his prior medications, namely:

1.  Carafate 1 gram b.i.d.

2.  Mylicon on a p.r.n. basis.

3.  Protonix 40 mg daily.

4.  Metoprolol  mg daily.

5.  Diltiazem  mg p.o. daily.

6.  BuSpar 5 mg b.i.d.

7.  Atorvastatin 40 mg at bedtime.

8.  Aspirin 81 mg daily.

9.  Eliquis 5 mg b.i.d.

10.  Lorazepam 0.5 mg p.o. b.i.d. p.r.n.

 

The patient will be followed in my office in 2 weeks.  The patient probably has underlying COPD as we
ll with prior history of smoking.  We will obtain PFTs in due course and consider CT chest in due cou
rse.

 

 

Dictated By: ASAD VAUGHN MD, SR/RENE

DD:    03/08/2019 13:32:52

DT:    03/08/2019 14:14:25

Conf#: [id number]

DID#:  [id number]

## 2019-03-23 ENCOUNTER — HOSPITAL ENCOUNTER (OUTPATIENT)
Dept: HOSPITAL 10 - E/R | Age: 79
Setting detail: OBSERVATION
LOS: 2 days | Discharge: HOME | End: 2019-03-25
Attending: INTERNAL MEDICINE | Admitting: INTERNAL MEDICINE
Payer: MEDICARE

## 2019-03-23 VITALS — HEART RATE: 78 BPM

## 2019-03-23 VITALS — DIASTOLIC BLOOD PRESSURE: 88 MMHG | RESPIRATION RATE: 18 BRPM | HEART RATE: 82 BPM | SYSTOLIC BLOOD PRESSURE: 151 MMHG

## 2019-03-23 VITALS — RESPIRATION RATE: 18 BRPM | DIASTOLIC BLOOD PRESSURE: 84 MMHG | SYSTOLIC BLOOD PRESSURE: 145 MMHG | HEART RATE: 83 BPM

## 2019-03-23 VITALS — RESPIRATION RATE: 20 BRPM | HEART RATE: 89 BPM | DIASTOLIC BLOOD PRESSURE: 77 MMHG | SYSTOLIC BLOOD PRESSURE: 131 MMHG

## 2019-03-23 VITALS
WEIGHT: 193.57 LBS | BODY MASS INDEX: 28.67 KG/M2 | BODY MASS INDEX: 28.67 KG/M2 | HEIGHT: 69 IN | HEIGHT: 69 IN | WEIGHT: 193.57 LBS

## 2019-03-23 VITALS — HEART RATE: 94 BPM

## 2019-03-23 VITALS — HEART RATE: 82 BPM

## 2019-03-23 DIAGNOSIS — Z87.891: ICD-10-CM

## 2019-03-23 DIAGNOSIS — I42.1: ICD-10-CM

## 2019-03-23 DIAGNOSIS — R25.1: Primary | ICD-10-CM

## 2019-03-23 DIAGNOSIS — Z79.82: ICD-10-CM

## 2019-03-23 DIAGNOSIS — Z79.01: ICD-10-CM

## 2019-03-23 DIAGNOSIS — I25.10: ICD-10-CM

## 2019-03-23 DIAGNOSIS — R06.02: ICD-10-CM

## 2019-03-23 DIAGNOSIS — I35.0: ICD-10-CM

## 2019-03-23 DIAGNOSIS — K80.20: ICD-10-CM

## 2019-03-23 DIAGNOSIS — E78.5: ICD-10-CM

## 2019-03-23 DIAGNOSIS — Z95.5: ICD-10-CM

## 2019-03-23 DIAGNOSIS — K21.9: ICD-10-CM

## 2019-03-23 DIAGNOSIS — Z86.73: ICD-10-CM

## 2019-03-23 DIAGNOSIS — I10: ICD-10-CM

## 2019-03-23 DIAGNOSIS — D72.819: ICD-10-CM

## 2019-03-23 DIAGNOSIS — I48.0: ICD-10-CM

## 2019-03-23 PROCEDURE — 85025 COMPLETE CBC W/AUTO DIFF WBC: CPT

## 2019-03-23 PROCEDURE — 83735 ASSAY OF MAGNESIUM: CPT

## 2019-03-23 PROCEDURE — 80053 COMPREHEN METABOLIC PANEL: CPT

## 2019-03-23 PROCEDURE — 71045 X-RAY EXAM CHEST 1 VIEW: CPT

## 2019-03-23 PROCEDURE — 93005 ELECTROCARDIOGRAM TRACING: CPT

## 2019-03-23 PROCEDURE — 87081 CULTURE SCREEN ONLY: CPT

## 2019-03-23 PROCEDURE — 84484 ASSAY OF TROPONIN QUANT: CPT

## 2019-03-23 PROCEDURE — 80048 BASIC METABOLIC PNL TOTAL CA: CPT

## 2019-03-23 RX ADMIN — ASPIRIN SCH MG: 81 TABLET, COATED ORAL at 18:04

## 2019-03-23 RX ADMIN — METOPROLOL SUCCINATE SCH MG: 100 TABLET, FILM COATED, EXTENDED RELEASE ORAL at 18:05

## 2019-03-23 RX ADMIN — SUCRALFATE SCH GM: 1 TABLET ORAL at 21:28

## 2019-03-23 RX ADMIN — LORAZEPAM PRN MG: 2 INJECTION, SOLUTION INTRAMUSCULAR; INTRAVENOUS at 18:13

## 2019-03-23 RX ADMIN — APIXABAN SCH MG: 5 TABLET, FILM COATED ORAL at 21:28

## 2019-03-23 RX ADMIN — ATORVASTATIN CALCIUM SCH MG: 20 TABLET, FILM COATED ORAL at 21:28

## 2019-03-23 RX ADMIN — BUSPIRONE HYDROCHLORIDE SCH MG: 5 TABLET ORAL at 21:28

## 2019-03-23 NOTE — ERD
ER Documentation


Chief Complaint


Chief Complaint





sob since last night with shaking and anxiety. no cp, no fevers.





HPI


78-year-old male with a history of hypertension, hyperlipidemia, coronary artery


disease status post PCI to the LAD and RCA, severe aortic stenosis, hypertrophic


cardiomyopathy, atrial fibrillation anticoagulated on Eliquis, chronic 


leukopenia, anxiety disorder and alcohol abuse presents to the ED complaining of


mild dyspnea since last night and tremors since awakening this morning.  Admits 


to recent alcohol use but none since yesterday.  Denies chest pain or 


palpitations.  No cough or hemoptysis.  Denies leg pain or swelling.  No 


abdominal pain, nausea or vomiting.  No melena, hematemesis or hematochezia.  


Denies dysuria, polyuria, hematuria or flank pain.  No fevers or chills.  Feels 


anxious but denies depression.





ROS


All systems reviewed and are negative except as per history of present illness.





Medications


Home Meds


Reported Medications


Lorazepam* (Lorazepam*) 0.5 Mg Tablet, 0.5 MG PO Q6 PRN for ANXIETY, TAB


   3/1/19


Metoprolol Succinate* (Toprol XL*) 100 Mg Tab.sr.24h, 100 MG PO DAILY, #30 TAB


   3/1/19


Simethicone* (Mylicon*) 80 Mg Tab, 80 MG PO Q6H PRN for DISTENSION/GAS/BLOATING,


TAB


   3/1/19


Diltiazem Hcl* (Cardizem CD*) 240 Mg Cap.sr.24h, 240 MG PO DAILY, #30 CAP


   19


Sucralfate* (Carafate*) 1 Gm Tab, 1 GM PO BID, TAB


   2/10/18


Simvastatin* (Zocor*) 40 Mg Tablet, 40 MG PO QHS, #30 TAB


   2/10/18


Pantoprazole* (Protonix*) 40 Mg Tablet.dr, 40 MG PO DAILY, TAB


   2/10/18


Buspirone Hcl* (Buspar*) 5 Mg Tab, 5 MG PO BID, TAB


   2/10/18


Aspirin (Low Dose Aspirin) 81 Mg Tablet.dr, 81 MG PO DAILY, #30 TAB


   2/10/18


Apixaban* (Eliquis*) 5 Mg Tablet, 5 MG PO BID, TAB


   2/10/18





Allergies


Allergies:  


Coded Allergies:  


     No Known Allergies (Verified  Allergy, Unknown, 3/24/19)





PMhx/Soc


Reviewed in chart. As per HPI.


History of Surgery:  Yes


Anesthesia Reaction:  No


Hx Neurological Disorder:  Yes (stroke 10 ys ago)


Hx Respiratory Disorders:  No


Hx Cardiac Disorders:  Yes (afib, aortic stenosis,htn, stents 2017)


Hx Psychiatric Problems:  No


Hx Miscellaneous Medical Probl:  Yes (anxiety)


Hx Alcohol Use:  Yes (stopped 1 mo ago)


Hx Substance Use:  No


Hx Tobacco Use:  Yes (stopped 54 ys ago)





FmHx


Father, mother and sister: CVA.  No family history of sudden cardiac death or 


cancer.





Physical Exam


Vitals





Vital Signs


  Date      Temp  Pulse  Resp  B/P (MAP)   Pulse Ox  O2          O2 Flow    FiO2


Time                                                 Delivery    Rate


   3/23/19  98.0     98    30      148/90        99


     08:29                          (109)





Physical Exam


Const:   Alert, anxious, mild distress.


Head:   Atraumatic 


Eyes:    Normal Conjunctiva.  Pupils equal reactive light, extraocular movements


are intact.


ENT:    Normal External Ears, Nose and Mouth.  Mucous membranes are moist.


Neck:               Full range of motion.  No JVD.


Resp:   Breath sounds are equal and clear to auscultation bilaterally


Cardio:   Regular rate and rhythm, 3/6 systolic murmur.


Abd:    Soft, obese, non tender, non distended.  No rebound or guarding.  Normal


bowel sounds


Skin:   No petechiae or rashes


Back:   No midline or flank tenderness


Ext:    No cyanosis, or edema


Neur:   Awake and alert.  Cranial nerves II through XII are grossly intact.  


Tremulous.


Psych:    Cooperative.  Anxious but not depressed.


Result Diagram:  


3/25/19 0559                                                                    


           3/25/19 0559





Results 24 hrs





Laboratory Tests


              Test
                                 3/23/19
08:30


              White Blood Count                      2.2 10^3/ul


              Red Blood Count                       4.98 10^6/ul


              Hemoglobin                               14.6 g/dl


              Hematocrit                                  45.2 %


              Mean Corpuscular Volume                    90.8 fl


              Mean Corpuscular Hemoglobin                29.3 pg


              Mean Corpuscular Hemoglobin
Concent     32.3 g/dl 



              Red Cell Distribution Width                 15.5 %


              Platelet Count                         113 10^3/UL


              Mean Platelet Volume                       10.9 fl


              Immature Granulocytes %                    0.500 %


              Neutrophils %                         %


              Segmented Neutrophils %
(Manual)             66 % 



              Band Neutrophils % (Manual)                    1 %


              Lymphocytes %                         %


              Lymphocytes % (Manual)                         8 %


              Reactive Lymphocytes %
(Manual)               7 % 



              Monocytes %                           %


              Monocytes % (Manual)                          17 %


              Eosinophils %                         %


              Basophils %                           %


              Metamyelocytes % (manual)                      1 %


              Nucleated Red Blood Cells %            0.0 /100WBC


              Immature Granulocytes #              0.010 10^3/ul


              Neutrophils #                         10^3/ul


              Neutrophils # (Manual)                 1.5 10^3/ul


              Band Neutrophils #                     0.0 10^3/ul


              Lymphocytes (Manual)                   0.1 10^3/ul


              Lymphocytes #                         10^3/ul


              Reactive Lymphocytes #                 0.1 10^3/ul


              Monocytes #                           10^3/ul


              Monocytes # (Manual)                   0.3 10^3/ul


              Eosinophils #                         10^3/ul


              Basophils #                           10^3/ul


              Metamyelocytes #                       0.0 10^3/ul


              Nucleated Red Blood Cells #           10^3/ul


              Platelet Estimate                    DECREASED


              Giant Platelets                                3 %


              Poikilocytosis                                  2+


              Anisocytosis                                    1+


              Ovalocytes                                      2+


              Sodium Level                            145 mmol/L


              Potassium Level                         3.9 mmol/L


              Chloride Level                          104 mmol/L


              Carbon Dioxide Level                     24 mmol/L


              Anion Gap                                       17


              Blood Urea Nitrogen                       12 mg/dl


              Creatinine                              0.70 mg/dl


              Est Glomerular Filtrat Rate
mL/min    mL/min 



              Glucose Level                            114 mg/dl


              Calcium Level                            9.0 mg/dl


              Total Bilirubin                          0.9 mg/dl


              Direct Bilirubin                        0.00 mg/dl


              Indirect Bilirubin                       0.9 mg/dl


              Aspartate Amino Transf
(AST/SGOT)         75 IU/L 



              Alanine Aminotransferase
(ALT/SGPT)       38 IU/L 



              Alkaline Phosphatase                      100 IU/L


              Troponin I                             0.013 ng/ml


              Total Protein                             8.1 g/dl


              Albumin                                   4.3 g/dl


              Globulin                                 3.80 g/dl


              Albumin/Globulin Ratio                        1.13





Current Medications


 Medications
   Dose
          Sig/Graciela
       Start Time
   Status  Last


 (Trade)       Ordered        Route
 PRN     Stop Time              Admin
Dose


                              Reason                                Admin


 Lorazepam
     1 mg           ONCE  ONCE
    3/23/19       DC           3/23/19


(Ativan)                      IV
            09:00
                       09:12



                                             3/23/19 09:01


 Thiamine       100 mg         ONCE  ONCE
    3/23/19       DC           3/23/19


HCl
                          IM
            09:00
                       09:57



(Vitamin B1)                                 3/23/19 09:01








Procedures/MDM


DOCUMENTS REVIEWED:  ED nurse, prior ED, prior records including recent 


admission for similar symptoms and chest pain; ACS was ruled out.








EKG:  Time: 07:57.  Sinus rhythm.  Ventricular rate 66.  No ectopy.  Normal OR 


interval.  LVH with prolonged QRS duration of 134 ms.  Lateral T wave changes 


but no acute ST segment elevation or depression.  My Interpretation








IMAGING: Chest AP portable.  Cardiomegaly.  The costophrenic angles are clear.  


No effusions or infiltrates.  No abnormalities of the bony thorax.  Unchanged 


from May 1, 2019.  My interpretation.











MEDICAL DECISION MAKIN-year-old male with a history of hypertension, 


hyperlipidemia, coronary artery disease status post PCI to the LAD and RCA, 


severe aortic stenosis, hypertrophic cardiomyopathy, atrial fibrillation 


anticoagulated on Eliquis, chronic leukopenia, anxiety disorder and alcohol 


abuse presents to the ED complaining of mild dyspnea since last night and 


tremors since awakening this morning.  CBC remarkable for leukopenia and 


thrombocytopenia but no anemia consistent with prior results.  Chemistry reveals


mild hyponatremia but no other electrolyte abnormalities or renal insufficiency.


 Troponin is not elevated.  Liver function tests are remarkable only for mildly 


elevated AST.  EKG is negative for acute ischemia or dysrhythmia.  Patient 


presents with tremors and anxiety consistent with alcohol withdrawal improved 


with intravenous lorazepam.  Thiamine is given.  No altered mental status or 


signs of delirium tremens.  No signs or symptoms of Warnicke's encephalopathy.  


No history of trauma, headache, visual changes, focal deficit or indication for 


neuroimaging.  Shortness of breath also related to anxiety and alcohol 


withdrawal without signs of an acute pulmonary process including but not limited


to pneumonia, congestive heart failure or pneumothorax. Pulmonary embolism 


unlikely as patient is already anticoagulated.  No fever or signs of an occult 


infectious process.  On arrival tachypnea and tachycardia consistent with 


systemic inflammatory response syndrome but secondary to anxiety and resolved 


completely after lorazepam.  No chest pain or evidence of acute coronary syndrom


e.  Admit to telemetry observation for further evaluation and management.








PATIENT CARE TRANSITIONED: Time: 11:30, Dr. Mata.








Counseled patient regarding diagnosis, diagnostic results and plan for 


admission.





Departure


Diagnosis:  


   Primary Impression:  


   Tremor


   Additional Impressions:  


   Alcohol withdrawal syndrome


   Complication of substance-induced condition:  uncomplicated  Qualified Codes:


    F10.230 - Alcohol dependence with withdrawal, uncomplicated


   Aortic stenosis


   Cardiac valve disease etiology:  etiology unspecified  Qualified Codes:  


   I35.0 - Nonrheumatic aortic (valve) stenosis


   CAD S/P percutaneous coronary angioplasty


   Hypertrophic cardiomyopathy


   Paroxysmal A-fib


   Anticoagulated by anticoagulation treatment


Condition:  Serious











ADDISON BERNAL MD           Mar 23, 2019 08:34

## 2019-03-24 VITALS — HEART RATE: 69 BPM | DIASTOLIC BLOOD PRESSURE: 73 MMHG | RESPIRATION RATE: 20 BRPM | SYSTOLIC BLOOD PRESSURE: 131 MMHG

## 2019-03-24 VITALS — HEART RATE: 74 BPM | DIASTOLIC BLOOD PRESSURE: 81 MMHG | RESPIRATION RATE: 20 BRPM | SYSTOLIC BLOOD PRESSURE: 139 MMHG

## 2019-03-24 VITALS — RESPIRATION RATE: 22 BRPM | HEART RATE: 55 BPM | DIASTOLIC BLOOD PRESSURE: 69 MMHG | SYSTOLIC BLOOD PRESSURE: 119 MMHG

## 2019-03-24 VITALS — HEART RATE: 71 BPM | RESPIRATION RATE: 18 BRPM | SYSTOLIC BLOOD PRESSURE: 120 MMHG | DIASTOLIC BLOOD PRESSURE: 63 MMHG

## 2019-03-24 VITALS — HEART RATE: 58 BPM | SYSTOLIC BLOOD PRESSURE: 110 MMHG | DIASTOLIC BLOOD PRESSURE: 57 MMHG | RESPIRATION RATE: 16 BRPM

## 2019-03-24 VITALS — HEART RATE: 56 BPM

## 2019-03-24 VITALS — RESPIRATION RATE: 18 BRPM | HEART RATE: 69 BPM | DIASTOLIC BLOOD PRESSURE: 76 MMHG | SYSTOLIC BLOOD PRESSURE: 139 MMHG

## 2019-03-24 VITALS — HEART RATE: 57 BPM

## 2019-03-24 VITALS — HEART RATE: 66 BPM

## 2019-03-24 RX ADMIN — FOLIC ACID SCH MLS/HR: 5 INJECTION, SOLUTION INTRAMUSCULAR; INTRAVENOUS; SUBCUTANEOUS at 16:50

## 2019-03-24 RX ADMIN — APIXABAN SCH MG: 5 TABLET, FILM COATED ORAL at 08:23

## 2019-03-24 RX ADMIN — APIXABAN SCH MG: 5 TABLET, FILM COATED ORAL at 20:07

## 2019-03-24 RX ADMIN — SUCRALFATE SCH GM: 1 TABLET ORAL at 08:22

## 2019-03-24 RX ADMIN — ATORVASTATIN CALCIUM SCH MG: 20 TABLET, FILM COATED ORAL at 20:07

## 2019-03-24 RX ADMIN — ASPIRIN SCH MG: 81 TABLET, COATED ORAL at 08:23

## 2019-03-24 RX ADMIN — BUSPIRONE HYDROCHLORIDE SCH MG: 5 TABLET ORAL at 08:21

## 2019-03-24 RX ADMIN — DILTIAZEM HYDROCHLORIDE SCH MG: 240 CAPSULE, EXTENDED RELEASE ORAL at 08:22

## 2019-03-24 RX ADMIN — METOPROLOL SUCCINATE SCH MG: 100 TABLET, FILM COATED, EXTENDED RELEASE ORAL at 08:23

## 2019-03-24 RX ADMIN — SUCRALFATE SCH GM: 1 TABLET ORAL at 20:07

## 2019-03-24 RX ADMIN — PANTOPRAZOLE SODIUM SCH MG: 40 TABLET, DELAYED RELEASE ORAL at 05:58

## 2019-03-24 RX ADMIN — BUSPIRONE HYDROCHLORIDE SCH MG: 5 TABLET ORAL at 20:07

## 2019-03-24 NOTE — RADRPT
Vent Rate: 66 bpm

RR Interval: 0 msec

CA Interval: 188 msec

QRS Duration: 134 msec

QT Interval: 518 msec

QTC Interval: 543 msec

P-R-T Axis: 39 - -5 - -35 degrees

 

 Normal sinus rhythm

 Left ventricular hypertrophy with QRS widening

 T wave abnormality, consider lateral ischemia

 Abnormal ECG

 

Electronically Signed By: Scotty Morris

## 2019-03-25 VITALS — HEART RATE: 60 BPM | SYSTOLIC BLOOD PRESSURE: 132 MMHG | RESPIRATION RATE: 18 BRPM | DIASTOLIC BLOOD PRESSURE: 71 MMHG

## 2019-03-25 VITALS — SYSTOLIC BLOOD PRESSURE: 132 MMHG | DIASTOLIC BLOOD PRESSURE: 68 MMHG | RESPIRATION RATE: 16 BRPM | HEART RATE: 53 BPM

## 2019-03-25 VITALS — HEART RATE: 57 BPM

## 2019-03-25 VITALS — SYSTOLIC BLOOD PRESSURE: 107 MMHG | RESPIRATION RATE: 20 BRPM | DIASTOLIC BLOOD PRESSURE: 56 MMHG | HEART RATE: 54 BPM

## 2019-03-25 VITALS — HEART RATE: 56 BPM

## 2019-03-25 VITALS — HEART RATE: 62 BPM

## 2019-03-25 RX ADMIN — METOPROLOL SUCCINATE SCH MG: 100 TABLET, FILM COATED, EXTENDED RELEASE ORAL at 08:30

## 2019-03-25 RX ADMIN — BUSPIRONE HYDROCHLORIDE SCH MG: 5 TABLET ORAL at 08:27

## 2019-03-25 RX ADMIN — ASPIRIN SCH MG: 81 TABLET, COATED ORAL at 08:27

## 2019-03-25 RX ADMIN — FOLIC ACID SCH MLS/HR: 5 INJECTION, SOLUTION INTRAMUSCULAR; INTRAVENOUS; SUBCUTANEOUS at 04:40

## 2019-03-25 RX ADMIN — DILTIAZEM HYDROCHLORIDE SCH MG: 240 CAPSULE, EXTENDED RELEASE ORAL at 08:30

## 2019-03-25 RX ADMIN — PANTOPRAZOLE SODIUM SCH MG: 40 TABLET, DELAYED RELEASE ORAL at 06:15

## 2019-03-25 RX ADMIN — SUCRALFATE SCH GM: 1 TABLET ORAL at 08:27

## 2019-03-25 RX ADMIN — APIXABAN SCH MG: 5 TABLET, FILM COATED ORAL at 08:27

## 2019-03-25 RX ADMIN — LORAZEPAM PRN MG: 2 INJECTION, SOLUTION INTRAMUSCULAR; INTRAVENOUS at 04:35

## 2019-08-14 NOTE — DS
DATE OF ADMISSION: 01/01/2019

DATE OF DISCHARGE: 01/06/2019

 

DISCHARGE DIAGNOSES:

1.  Tremor.

2.  Shortness of breath.

3.  Elevated BNP.

4.  Anxiety.

5.  Severe aortic stenosis.

6.  Coronary artery disease.

7.  Hypertension.

8.  Paroxysmal atrial fibrillation.

 

CONSULTANTS:  Cardiology, Jayce Luis MD

 

HISTORY OF PRESENT ILLNESS:  The patient is a 78-year-old  male with history of paroxysmal A
Fib, severe aortic stenosis, who was in his usual state of health until the morning of admission when
 he woke with shakiness and more dyspnea on exertion than usual.  The patient does have baseline shor
tness of breath especially with exertion due to the aortic stenosis.  The patient reports he saw his 
cardiologist last week.  Arrangements are being made to have a surgical procedure.  The patient noted
 increased weakness on the morning of admission with increased dyspnea on exertion.  He also has note
d he has been increasingly anxious.  He does drink he says approximately 6 ounces of vodka daily that
 he has done for quite some time.  In the ER, it was felt the patient may have a component of alcohol
 withdrawal and was given Ativan.  However, the patient states he is thinking about the same amount a
s he usually does.  The patient symptomatically did improve after the Ativan.  Denies any chest pains
, cough, recent illness, dizziness, fever, chills or night sweats.

 

PHYSICAL EXAMINATION:

VITAL SIGNS:  On admission notable for temperature of 98.1, pulse 73, blood pressure 137/81, respirat
ions 29, saturation 100% on room air.

GENERAL APPEARANCE:  Overweight  male, who is in no acute distress, who appeared nontoxic.

LUNGS:  Clear to auscultation.

CARDIAC:  Regular rate and rhythm with III/VI systolic murmur.

EXTREMITIES:  Without cyanosis, clubbing or edema.

 

LABORATORY DATA:  Notable for white count of 4, hemoglobin 13.5, platelets 141.  Sodium 141, potassiu
m 4.2, BUN was 13, creatinine 0.56, glucose 114.  AST 77, ALT 33, bilirubin 1.6.  Troponin is 0.026. 
 BNP 2760.

 

DIAGNOSTIC DATA:  Chest x-ray:  No changes from 11/18/2018.  EKG was sinus rhythm at 72 with LVH, no 
acute changes.

 

HOSPITAL COURSE:

1.  Tremor, unclear etiology.  There is concern over alcohol withdrawal versus anxiety.  The patient 
did respond well to benzodiazepine and resolved after his initial dose in the ER, but was continued d
uring his hospitalization.  The patient normally does take them as an outpatient, which he will grant
nue.

2.  Shortness of breath.  The patient with episodes of shortness of breath and dyspnea, was saturatin
g well.  BNP was slightly elevated.  He was seen in cardiology consultation by Dr. Luis.  The patien
t was given a few doses of IV Lasix which he responded well to with symptomatic improvement and curre
ntly is at his baseline.

3.  Elevated BNP, as noted above.

4.  Severe aortic stenosis.  We will continue on his current cardiac management which he will continu
e as an outpatient.

5.  Hypokalemia.  The patient was given potassium supplement and will be monitored closely as an outp
atient.

6.  Coronary artery disease, stable.

7.  Hypertension, stable.

8.  Anxiety.

 

DISPOSITION:  Discharge the patient home.

 

DISCHARGE MEDICATIONS:

1.  Eliquis 5 mg b.i.d.

2.  Aspirin 81 mg daily.

3.  BuSpar 5 mg b.i.d. p.r.n.

4.  Diltiazem 240 mg daily.

5.  Lorazepam 0.5 mg p.r.n.

6.  Reglan 5 mg p.r.n.

7.  Protonix 40 mg daily.

8.  Simvastatin 40 mg daily.

9.  Carafate 1 g b.i.d.

 

CONDITION:  Fair.

 

FOLLOWUP:  The patient is to follow up with myself next week and with his cardiologist in 1 to 2 week
s.

 

 

Dictated By: LISA NAVARRETE/RENE

DD:    01/06/2019 13:50:42

DT:    01/06/2019 15:33:14

Conf#: [id number]

DID#:  [id number]
menorrhagia

## 2019-09-11 ENCOUNTER — HOSPITAL ENCOUNTER (INPATIENT)
Dept: HOSPITAL 91 - E/R | Age: 79
LOS: 2 days | Discharge: HOME | DRG: 309 | End: 2019-09-13
Payer: MEDICARE

## 2019-09-11 DIAGNOSIS — I25.10: ICD-10-CM

## 2019-09-11 DIAGNOSIS — Z79.01: ICD-10-CM

## 2019-09-11 DIAGNOSIS — I44.7: ICD-10-CM

## 2019-09-11 DIAGNOSIS — Z87.891: ICD-10-CM

## 2019-09-11 DIAGNOSIS — I35.0: ICD-10-CM

## 2019-09-11 DIAGNOSIS — Z95.5: ICD-10-CM

## 2019-09-11 DIAGNOSIS — I42.2: ICD-10-CM

## 2019-09-11 DIAGNOSIS — I10: ICD-10-CM

## 2019-09-11 DIAGNOSIS — Z86.73: ICD-10-CM

## 2019-09-11 DIAGNOSIS — R00.0: ICD-10-CM

## 2019-09-11 DIAGNOSIS — Z95.2: ICD-10-CM

## 2019-09-11 DIAGNOSIS — I48.92: Primary | ICD-10-CM

## 2019-09-11 DIAGNOSIS — F41.9: ICD-10-CM

## 2019-09-11 DIAGNOSIS — Z79.82: ICD-10-CM

## 2019-09-11 DIAGNOSIS — I24.8: ICD-10-CM

## 2019-09-11 LAB
ABNORMAL IP MESSAGE: 1
ADD MAN DIFF?: NO
ANION GAP: 9 (ref 5–13)
BASOPHIL #: 0 10^3/UL (ref 0–0.1)
BASOPHILS %: 0.4 % (ref 0–2)
BLOOD UREA NITROGEN: 19 MG/DL (ref 7–20)
CALCIUM: 9.2 MG/DL (ref 8.4–10.2)
CARBON DIOXIDE: 25 MMOL/L (ref 21–31)
CHLORIDE: 105 MMOL/L (ref 97–110)
CK INDEX: 4
CK-MB: 2.14 NG/ML (ref 0–2.4)
CREATINE KINASE: 54 IU/L (ref 23–200)
CREATININE: 0.79 MG/DL (ref 0.61–1.24)
EOSINOPHILS #: 0 10^3/UL (ref 0–0.5)
EOSINOPHILS %: 0.2 % (ref 0–7)
GLUCOSE: 118 MG/DL (ref 70–220)
HEMATOCRIT: 40.5 % (ref 42–52)
HEMOGLOBIN: 12.1 G/DL (ref 14–18)
IMMATURE GRANS #M: 0.01 10^3/UL (ref 0–0.03)
IMMATURE GRANS % (M): 0.2 % (ref 0–0.43)
LYMPHOCYTES #: 0.4 10^3/UL (ref 0.8–2.9)
LYMPHOCYTES %: 8.5 % (ref 15–51)
MEAN CORPUSCULAR HEMOGLOBIN: 25.3 PG (ref 29–33)
MEAN CORPUSCULAR HGB CONC: 29.9 G/DL (ref 32–37)
MEAN CORPUSCULAR VOLUME: 84.7 FL (ref 82–101)
MEAN PLATELET VOLUME: 10.8 FL (ref 7.4–10.4)
MONOCYTE #: 0.4 10^3/UL (ref 0.3–0.9)
MONOCYTES %: 8.7 % (ref 0–11)
NEUTROPHIL #: 3.8 10^3/UL (ref 1.6–7.5)
NEUTROPHILS %: 82 % (ref 39–77)
NUCLEATED RED BLOOD CELLS #: 0 10^3/UL (ref 0–0)
NUCLEATED RED BLOOD CELLS%: 0 /100WBC (ref 0–0)
PLATELET COUNT: 219 10^3/UL (ref 140–415)
POSITIVE DIFF: (no result)
POTASSIUM: 4.4 MMOL/L (ref 3.5–5.1)
RED BLOOD COUNT: 4.78 10^6/UL (ref 4.7–6.1)
RED CELL DISTRIBUTION WIDTH: 15.8 % (ref 11.5–14.5)
SODIUM: 139 MMOL/L (ref 135–144)
TROPONIN-I: 0.05 NG/ML (ref 0–0.12)
TROPONIN-I: 0.07 NG/ML (ref 0–0.12)
WHITE BLOOD COUNT: 4.6 10^3/UL (ref 4.8–10.8)

## 2019-09-11 PROCEDURE — 84484 ASSAY OF TROPONIN QUANT: CPT

## 2019-09-11 PROCEDURE — 71045 X-RAY EXAM CHEST 1 VIEW: CPT

## 2019-09-11 PROCEDURE — 82550 ASSAY OF CK (CPK): CPT

## 2019-09-11 PROCEDURE — 93005 ELECTROCARDIOGRAM TRACING: CPT

## 2019-09-11 PROCEDURE — 36415 COLL VENOUS BLD VENIPUNCTURE: CPT

## 2019-09-11 PROCEDURE — 80053 COMPREHEN METABOLIC PANEL: CPT

## 2019-09-11 PROCEDURE — 87081 CULTURE SCREEN ONLY: CPT

## 2019-09-11 PROCEDURE — 96375 TX/PRO/DX INJ NEW DRUG ADDON: CPT

## 2019-09-11 PROCEDURE — 85025 COMPLETE CBC W/AUTO DIFF WBC: CPT

## 2019-09-11 PROCEDURE — 99285 EMERGENCY DEPT VISIT HI MDM: CPT

## 2019-09-11 PROCEDURE — 80048 BASIC METABOLIC PNL TOTAL CA: CPT

## 2019-09-11 PROCEDURE — 82553 CREATINE MB FRACTION: CPT

## 2019-09-11 PROCEDURE — 96374 THER/PROPH/DIAG INJ IV PUSH: CPT

## 2019-09-11 PROCEDURE — 96376 TX/PRO/DX INJ SAME DRUG ADON: CPT

## 2019-09-11 PROCEDURE — 83735 ASSAY OF MAGNESIUM: CPT

## 2019-09-11 RX ADMIN — FUROSEMIDE 1 MG: 10 INJECTION, SOLUTION INTRAVENOUS at 14:30

## 2019-09-11 RX ADMIN — APIXABAN 1 MG: 5 TABLET, FILM COATED ORAL at 21:14

## 2019-09-11 RX ADMIN — DIGOXIN 1 MCG: 0.25 INJECTION INTRAMUSCULAR; INTRAVENOUS at 15:50

## 2019-09-11 RX ADMIN — BUSPIRONE HYDROCHLORIDE 1 MG: 5 TABLET ORAL at 21:13

## 2019-09-11 RX ADMIN — ASPIRIN 325 MG ORAL TABLET 1 MG: 325 PILL ORAL at 14:21

## 2019-09-11 RX ADMIN — METOPROLOL TARTRATE 1 MG: 25 TABLET ORAL at 17:34

## 2019-09-11 RX ADMIN — DILTIAZEM HCL 125 MG/125ML IN DEXTROSE 5% IV SOLN (1 MG/ML) 1 MLS/HR: 125-5/125 SOLUTION at 13:53

## 2019-09-11 RX ADMIN — METOPROLOL SUCCINATE 1 MG: 50 TABLET, EXTENDED RELEASE ORAL at 16:12

## 2019-09-11 RX ADMIN — SUCRALFATE 1 GM: 1 TABLET ORAL at 21:14

## 2019-09-11 RX ADMIN — AMIODARONE HYDROCHLORIDE 1 MLS/HR: 1.5 INJECTION, SOLUTION INTRAVENOUS at 12:34

## 2019-09-11 RX ADMIN — AMIODARONE HYDROCHLORIDE 1 MLS/HR: 1.5 INJECTION, SOLUTION INTRAVENOUS at 13:10

## 2019-09-11 RX ADMIN — METOPROLOL SUCCINATE 1 MG: 100 TABLET, EXTENDED RELEASE ORAL at 22:01

## 2019-09-11 RX ADMIN — DILTIAZEM HYDROCHLORIDE 1 MG: 5 INJECTION INTRAVENOUS at 12:55

## 2019-09-12 LAB
ABNORMAL IP MESSAGE: 1
ADD MAN DIFF?: NO
ALANINE AMINOTRANSFERASE: 21 IU/L (ref 13–69)
ALBUMIN/GLOBULIN RATIO: 0.93
ALBUMIN: 3.1 G/DL (ref 3.3–4.9)
ALKALINE PHOSPHATASE: 66 IU/L (ref 42–121)
ANION GAP: 7 (ref 5–13)
ASPARTATE AMINO TRANSFERASE: 34 IU/L (ref 15–46)
BASOPHIL #: 0 10^3/UL (ref 0–0.1)
BASOPHILS %: 0.8 % (ref 0–2)
BILIRUBIN,DIRECT: 0 MG/DL (ref 0–0.2)
BILIRUBIN,TOTAL: 2 MG/DL (ref 0.2–1.3)
BLOOD UREA NITROGEN: 14 MG/DL (ref 7–20)
CALCIUM: 8.4 MG/DL (ref 8.4–10.2)
CARBON DIOXIDE: 25 MMOL/L (ref 21–31)
CHLORIDE: 106 MMOL/L (ref 97–110)
CK INDEX: 3.5
CK-MB: 1.98 NG/ML (ref 0–2.4)
CREATINE KINASE: 56 IU/L (ref 23–200)
CREATININE: 0.73 MG/DL (ref 0.61–1.24)
EOSINOPHILS #: 0.1 10^3/UL (ref 0–0.5)
EOSINOPHILS %: 1.7 % (ref 0–7)
GLOBULIN: 3.3 G/DL (ref 1.3–3.2)
GLUCOSE: 75 MG/DL (ref 70–220)
HEMATOCRIT: 36.3 % (ref 42–52)
HEMOGLOBIN: 11 G/DL (ref 14–18)
IMMATURE GRANS #M: 0.02 10^3/UL (ref 0–0.03)
IMMATURE GRANS % (M): 0.6 % (ref 0–0.43)
LYMPHOCYTES #: 0.6 10^3/UL (ref 0.8–2.9)
LYMPHOCYTES %: 15.6 % (ref 15–51)
MAGNESIUM: 1.9 MG/DL (ref 1.7–2.5)
MEAN CORPUSCULAR HEMOGLOBIN: 25.6 PG (ref 29–33)
MEAN CORPUSCULAR HGB CONC: 30.3 G/DL (ref 32–37)
MEAN CORPUSCULAR VOLUME: 84.4 FL (ref 82–101)
MEAN PLATELET VOLUME: 11.4 FL (ref 7.4–10.4)
MONOCYTE #: 0.6 10^3/UL (ref 0.3–0.9)
MONOCYTES %: 15.3 % (ref 0–11)
NEUTROPHIL #: 2.4 10^3/UL (ref 1.6–7.5)
NEUTROPHILS %: 66 % (ref 39–77)
NUCLEATED RED BLOOD CELLS #: 0 10^3/UL (ref 0–0)
NUCLEATED RED BLOOD CELLS%: 0 /100WBC (ref 0–0)
PLATELET COUNT: 197 10^3/UL (ref 140–415)
POSITIVE DIFF: (no result)
POTASSIUM: 3.7 MMOL/L (ref 3.5–5.1)
RED BLOOD COUNT: 4.3 10^6/UL (ref 4.7–6.1)
RED CELL DISTRIBUTION WIDTH: 15.9 % (ref 11.5–14.5)
SODIUM: 138 MMOL/L (ref 135–144)
TOTAL PROTEIN: 6.4 G/DL (ref 6.1–8.1)
TROPONIN-I: 0.1 NG/ML (ref 0–0.12)
WHITE BLOOD COUNT: 3.6 10^3/UL (ref 4.8–10.8)

## 2019-09-12 RX ADMIN — BUSPIRONE HYDROCHLORIDE 1 MG: 5 TABLET ORAL at 22:16

## 2019-09-12 RX ADMIN — METOPROLOL SUCCINATE 1 MG: 50 TABLET, EXTENDED RELEASE ORAL at 08:32

## 2019-09-12 RX ADMIN — METOPROLOL SUCCINATE 1 MG: 100 TABLET, EXTENDED RELEASE ORAL at 08:32

## 2019-09-12 RX ADMIN — DILTIAZEM HYDROCHLORIDE 1 MG: 180 CAPSULE, COATED, EXTENDED RELEASE ORAL at 14:21

## 2019-09-12 RX ADMIN — ATORVASTATIN CALCIUM 1 MG: 20 TABLET, FILM COATED ORAL at 08:27

## 2019-09-12 RX ADMIN — ASPIRIN 1 MG: 81 TABLET, COATED ORAL at 08:28

## 2019-09-12 RX ADMIN — MAGNESIUM SULFATE HEPTAHYDRATE 1 MLS/HR: 40 INJECTION, SOLUTION INTRAVENOUS at 11:03

## 2019-09-12 RX ADMIN — APIXABAN 1 MG: 5 TABLET, FILM COATED ORAL at 08:28

## 2019-09-12 RX ADMIN — DIGOXIN 1 MG: 250 TABLET ORAL at 14:20

## 2019-09-12 RX ADMIN — POTASSIUM CHLORIDE 1 MEQ: 1500 TABLET, EXTENDED RELEASE ORAL at 11:03

## 2019-09-12 RX ADMIN — METOPROLOL SUCCINATE 1 MG: 100 TABLET, EXTENDED RELEASE ORAL at 21:35

## 2019-09-12 RX ADMIN — BUSPIRONE HYDROCHLORIDE 1 MG: 5 TABLET ORAL at 08:28

## 2019-09-12 RX ADMIN — DILTIAZEM HYDROCHLORIDE 1 MG: 180 CAPSULE, COATED, EXTENDED RELEASE ORAL at 21:35

## 2019-09-12 RX ADMIN — PANTOPRAZOLE SODIUM 1 MG: 40 TABLET, DELAYED RELEASE ORAL at 05:56

## 2019-09-12 RX ADMIN — SUCRALFATE 1 GM: 1 TABLET ORAL at 21:34

## 2019-09-12 RX ADMIN — SUCRALFATE 1 GM: 1 TABLET ORAL at 08:28

## 2019-09-12 RX ADMIN — APIXABAN 1 MG: 5 TABLET, FILM COATED ORAL at 21:35

## 2019-09-13 RX ADMIN — APIXABAN 1 MG: 5 TABLET, FILM COATED ORAL at 09:25

## 2019-09-13 RX ADMIN — AMIODARONE HYDROCHLORIDE 1 MG: 200 TABLET ORAL at 09:26

## 2019-09-13 RX ADMIN — BUSPIRONE HYDROCHLORIDE 1 MG: 5 TABLET ORAL at 09:26

## 2019-09-13 RX ADMIN — PANTOPRAZOLE SODIUM 1 MG: 40 TABLET, DELAYED RELEASE ORAL at 05:54

## 2019-09-13 RX ADMIN — SUCRALFATE 1 GM: 1 TABLET ORAL at 09:25

## 2019-09-13 RX ADMIN — METOPROLOL SUCCINATE 1 MG: 100 TABLET, EXTENDED RELEASE ORAL at 09:25

## 2019-09-13 RX ADMIN — ATORVASTATIN CALCIUM 1 MG: 20 TABLET, FILM COATED ORAL at 09:25

## 2019-09-13 RX ADMIN — DILTIAZEM HYDROCHLORIDE 1 MG: 120 CAPSULE, COATED, EXTENDED RELEASE ORAL at 09:24

## 2019-09-17 ENCOUNTER — HOSPITAL ENCOUNTER (INPATIENT)
Dept: HOSPITAL 91 - E/R | Age: 79
LOS: 2 days | Discharge: HOME | DRG: 310 | End: 2019-09-19
Payer: MEDICARE

## 2019-09-17 ENCOUNTER — HOSPITAL ENCOUNTER (INPATIENT)
Dept: HOSPITAL 10 - E/R | Age: 79
LOS: 2 days | Discharge: HOME | DRG: 310 | End: 2019-09-19
Attending: INTERNAL MEDICINE | Admitting: INTERNAL MEDICINE
Payer: MEDICARE

## 2019-09-17 VITALS — RESPIRATION RATE: 18 BRPM | SYSTOLIC BLOOD PRESSURE: 103 MMHG | DIASTOLIC BLOOD PRESSURE: 67 MMHG | HEART RATE: 67 BPM

## 2019-09-17 VITALS — HEART RATE: 76 BPM | DIASTOLIC BLOOD PRESSURE: 68 MMHG | RESPIRATION RATE: 20 BRPM | SYSTOLIC BLOOD PRESSURE: 124 MMHG

## 2019-09-17 VITALS
HEIGHT: 69 IN | WEIGHT: 194.89 LBS | WEIGHT: 194.89 LBS | BODY MASS INDEX: 28.87 KG/M2 | BODY MASS INDEX: 28.87 KG/M2 | HEIGHT: 69 IN

## 2019-09-17 DIAGNOSIS — K21.9: ICD-10-CM

## 2019-09-17 DIAGNOSIS — M19.90: ICD-10-CM

## 2019-09-17 DIAGNOSIS — I48.0: ICD-10-CM

## 2019-09-17 DIAGNOSIS — Z79.01: ICD-10-CM

## 2019-09-17 DIAGNOSIS — I25.10: ICD-10-CM

## 2019-09-17 DIAGNOSIS — Z95.0: ICD-10-CM

## 2019-09-17 DIAGNOSIS — Z95.2: ICD-10-CM

## 2019-09-17 DIAGNOSIS — Z79.82: ICD-10-CM

## 2019-09-17 DIAGNOSIS — I10: ICD-10-CM

## 2019-09-17 DIAGNOSIS — R07.89: ICD-10-CM

## 2019-09-17 DIAGNOSIS — E78.5: ICD-10-CM

## 2019-09-17 DIAGNOSIS — I48.92: Primary | ICD-10-CM

## 2019-09-17 DIAGNOSIS — Z87.891: ICD-10-CM

## 2019-09-17 DIAGNOSIS — Z95.5: ICD-10-CM

## 2019-09-17 DIAGNOSIS — F41.9: ICD-10-CM

## 2019-09-17 DIAGNOSIS — D72.819: ICD-10-CM

## 2019-09-17 PROCEDURE — 83735 ASSAY OF MAGNESIUM: CPT

## 2019-09-17 PROCEDURE — 82550 ASSAY OF CK (CPK): CPT

## 2019-09-17 PROCEDURE — 93005 ELECTROCARDIOGRAM TRACING: CPT

## 2019-09-17 PROCEDURE — 84484 ASSAY OF TROPONIN QUANT: CPT

## 2019-09-17 PROCEDURE — 92960 CARDIOVERSION ELECTRIC EXT: CPT

## 2019-09-17 PROCEDURE — 84439 ASSAY OF FREE THYROXINE: CPT

## 2019-09-17 PROCEDURE — 84443 ASSAY THYROID STIM HORMONE: CPT

## 2019-09-17 PROCEDURE — 80307 DRUG TEST PRSMV CHEM ANLYZR: CPT

## 2019-09-17 PROCEDURE — 83880 ASSAY OF NATRIURETIC PEPTIDE: CPT

## 2019-09-17 PROCEDURE — 85025 COMPLETE CBC W/AUTO DIFF WBC: CPT

## 2019-09-17 PROCEDURE — 80048 BASIC METABOLIC PNL TOTAL CA: CPT

## 2019-09-17 PROCEDURE — 71045 X-RAY EXAM CHEST 1 VIEW: CPT

## 2019-09-17 PROCEDURE — 82553 CREATINE MB FRACTION: CPT

## 2019-09-17 PROCEDURE — 36415 COLL VENOUS BLD VENIPUNCTURE: CPT

## 2019-09-17 PROCEDURE — 99285 EMERGENCY DEPT VISIT HI MDM: CPT

## 2019-09-17 RX ADMIN — METOPROLOL SUCCINATE SCH MG: 50 TABLET, EXTENDED RELEASE ORAL at 21:00

## 2019-09-17 RX ADMIN — ASPIRIN 81 MG CHEWABLE TABLET 1 MG: 81 TABLET CHEWABLE at 11:53

## 2019-09-17 RX ADMIN — APIXABAN SCH MG: 5 TABLET, FILM COATED ORAL at 22:05

## 2019-09-17 RX ADMIN — DILTIAZEM HYDROCHLORIDE 1 MG: 180 CAPSULE, COATED, EXTENDED RELEASE ORAL at 21:00

## 2019-09-17 RX ADMIN — METOPROLOL SUCCINATE 1 MG: 50 TABLET, EXTENDED RELEASE ORAL at 21:00

## 2019-09-17 RX ADMIN — SODIUM CHLORIDE 1 MLS/HR: 234 INJECTION INTRAMUSCULAR; INTRAVENOUS; SUBCUTANEOUS at 12:26

## 2019-09-17 RX ADMIN — AMIODARONE HYDROCHLORIDE 1 ML: 1.5 INJECTION, SOLUTION INTRAVENOUS at 11:54

## 2019-09-17 RX ADMIN — THIAMINE HYDROCHLORIDE 1 MLS/HR: 100 INJECTION, SOLUTION INTRAMUSCULAR; INTRAVENOUS at 12:30

## 2019-09-17 RX ADMIN — DILTIAZEM HYDROCHLORIDE SCH MG: 180 CAPSULE, EXTENDED RELEASE ORAL at 21:00

## 2019-09-17 RX ADMIN — APIXABAN 1 MG: 5 TABLET, FILM COATED ORAL at 22:05

## 2019-09-18 VITALS — RESPIRATION RATE: 19 BRPM | HEART RATE: 77 BPM | DIASTOLIC BLOOD PRESSURE: 68 MMHG | SYSTOLIC BLOOD PRESSURE: 112 MMHG

## 2019-09-18 VITALS — RESPIRATION RATE: 2 BRPM | SYSTOLIC BLOOD PRESSURE: 102 MMHG | DIASTOLIC BLOOD PRESSURE: 57 MMHG | HEART RATE: 62 BPM

## 2019-09-18 VITALS — SYSTOLIC BLOOD PRESSURE: 119 MMHG | HEART RATE: 104 BPM | RESPIRATION RATE: 20 BRPM | DIASTOLIC BLOOD PRESSURE: 85 MMHG

## 2019-09-18 VITALS — RESPIRATION RATE: 18 BRPM | DIASTOLIC BLOOD PRESSURE: 64 MMHG | HEART RATE: 74 BPM | SYSTOLIC BLOOD PRESSURE: 113 MMHG

## 2019-09-18 VITALS — HEART RATE: 70 BPM | RESPIRATION RATE: 20 BRPM | DIASTOLIC BLOOD PRESSURE: 70 MMHG | SYSTOLIC BLOOD PRESSURE: 108 MMHG

## 2019-09-18 VITALS — DIASTOLIC BLOOD PRESSURE: 79 MMHG | HEART RATE: 84 BPM | RESPIRATION RATE: 20 BRPM | SYSTOLIC BLOOD PRESSURE: 118 MMHG

## 2019-09-18 PROCEDURE — 5A2204Z RESTORATION OF CARDIAC RHYTHM, SINGLE: ICD-10-PCS

## 2019-09-18 PROCEDURE — 5A2204Z RESTORATION OF CARDIAC RHYTHM, SINGLE: ICD-10-PCS | Performed by: INTERNAL MEDICINE

## 2019-09-18 RX ADMIN — DILTIAZEM HYDROCHLORIDE 1 MG: 180 CAPSULE, COATED, EXTENDED RELEASE ORAL at 09:28

## 2019-09-18 RX ADMIN — METOPROLOL SUCCINATE SCH MG: 50 TABLET, EXTENDED RELEASE ORAL at 22:04

## 2019-09-18 RX ADMIN — METOPROLOL SUCCINATE 1 MG: 50 TABLET, EXTENDED RELEASE ORAL at 09:30

## 2019-09-18 RX ADMIN — APIXABAN SCH MG: 5 TABLET, FILM COATED ORAL at 09:28

## 2019-09-18 RX ADMIN — METOPROLOL SUCCINATE 1 MG: 50 TABLET, EXTENDED RELEASE ORAL at 22:04

## 2019-09-18 RX ADMIN — APIXABAN 1 MG: 5 TABLET, FILM COATED ORAL at 09:28

## 2019-09-18 RX ADMIN — METOPROLOL SUCCINATE SCH MG: 50 TABLET, EXTENDED RELEASE ORAL at 09:30

## 2019-09-18 RX ADMIN — DILTIAZEM HYDROCHLORIDE SCH MG: 180 CAPSULE, EXTENDED RELEASE ORAL at 09:28

## 2019-09-18 RX ADMIN — APIXABAN 1 MG: 5 TABLET, FILM COATED ORAL at 22:01

## 2019-09-18 RX ADMIN — APIXABAN SCH MG: 5 TABLET, FILM COATED ORAL at 22:01

## 2019-09-19 VITALS — RESPIRATION RATE: 18 BRPM | HEART RATE: 68 BPM | SYSTOLIC BLOOD PRESSURE: 133 MMHG | DIASTOLIC BLOOD PRESSURE: 69 MMHG

## 2019-09-19 VITALS — SYSTOLIC BLOOD PRESSURE: 115 MMHG | DIASTOLIC BLOOD PRESSURE: 60 MMHG | HEART RATE: 70 BPM | RESPIRATION RATE: 19 BRPM

## 2019-09-19 VITALS — DIASTOLIC BLOOD PRESSURE: 61 MMHG | HEART RATE: 71 BPM | SYSTOLIC BLOOD PRESSURE: 115 MMHG | RESPIRATION RATE: 18 BRPM

## 2019-09-19 RX ADMIN — METOPROLOL SUCCINATE SCH MG: 50 TABLET, EXTENDED RELEASE ORAL at 08:48

## 2019-09-19 RX ADMIN — APIXABAN SCH MG: 5 TABLET, FILM COATED ORAL at 08:48

## 2019-09-19 RX ADMIN — AMIODARONE HYDROCHLORIDE 1 MG: 200 TABLET ORAL at 08:49

## 2019-09-19 RX ADMIN — METOPROLOL SUCCINATE 1 MG: 50 TABLET, EXTENDED RELEASE ORAL at 08:48

## 2019-09-19 RX ADMIN — APIXABAN 1 MG: 5 TABLET, FILM COATED ORAL at 08:48

## 2020-12-21 NOTE — RADRPT
PROCEDURE:   CT abdomen and pelvis with contrast. 

 

CLINICAL INDICATION:   Abdominal pain, possible ischemic colitis.  

 

TECHNIQUE:   CT scan of the abdomen and pelvis with contrast was performed.  Coronal and sagittal im
ages were also reformatted.  125 cc Omnipaque-300 intravenous contrast was administered without comp
lication.  Total exam CTDIvol = 18.84 mGy and DLP = 1120.92 mGy-cm.

 

COMPARISON:   None available. 

 

FINDINGS:

 

Visualized lower thorax: Intraparenchymal there is most conspicuous in the anterior right lower lobe
 is present.  There is minimal basilar scarring without infiltrates.  Visualized heart is mildly enl
arged.  There is no evidence for pleural effusion. Calcified diaphragmatic pleural plaques on the le
ft are present

 

Liver, gallbladder, pancreas and spleen:  Low attenuation of the liver consistent with hepatic steat
osis with preserved liver contour and normal hepatic size.  There is no evidence for liver mass or d
uctal dilatation.  Tiny gallstones are suggested in the dependent portion without evidence of cholec
ystitis.  No common bile duct dilatation is evident.  The pancreas is normal.  The spleen is normal,
 not enlarged.

 

Adrenal glands and genitourinary system:  The adrenal glands are normal bilaterally.  The kidneys ar
e normal in size, contour and attenuation with no evidence for masses, calculi or hydronephrosis. Sy
mmetric enhancement of the kidneys is present without evidence of pyelonephritis .  The ureters are 
unremarkable.  The urinary bladder shows no abnormality.  The prostate gland is normal in size.  The
 visualized scrotum is grossly normal.  

 

Gastrointestinal system:  Small sliding hiatal hernia is present, the remainder of the stomach is un
remarkable  There is no evidence of obstruction, ileus or inflammation.  The appendix and surroundin
g fat are normal. Extensive diverticular disease is present throughout the entire colon.  There is n
o evidence however of colonic wall thickening, pneumatosis or pericolonic inflammation to suggest co
litis.

 

Peritoneum, retroperitoneum, vessels and lymph nodes:  The abdominal aorta is normal in caliber.  Th
ere is mild aortic and iliac system atherosclerotic calcification. No arterial occlusion or stenosis
 is evident of the major abdominal aortic branches  Inferior vena cava is normal in caliber.  There 
is no evidence for adenopathy.  The peritoneal cavity is normal with no evidence for ascites. Small 
fat-containing umbilical hernia measures approximate 1.4 cm (series 3 image 109).  There is no evide
nce of inflammation within the hernia sac

 

Osseous structures and musculoskeletal system:  There is no evidence for acute osseous abnormality o
r muscular pathology.  Demineralization is noted with degenerative disk disease severe at the L2-3 a
nd L3-4.  No subcutaneous abnormalities are present.

 

RPTAT:HJJR

 

IMPRESSION:

 

1.  No evidence of ischemic colitis.

2.  Extensive diverticular disease of the colon without diverticulitis.

3. Tiny gallstones are suggested without evidence of cholecystitis.

4.  Sliding hiatal hernia.

5.  Atherosclerotic calcification of the aorta and branches without evidence of arterial occlusion.

6.  Approximately 1.4 cm fat-containing umbilical hernia slightly to the left of midline.

7.  Severe degenerative disk narrowing at L2-3 and L3-4.

8.  Cardiomegaly, bullous changes of the lung bases and calcified diaphragmatic pleural plaques.

9.  Hepatic steatosis.

_____________________________________________ 

Physician Carol           Date    Time 

Electronically viewed and signed by Physician Carol on 08/17/2017 19:21 

 

D:  08/17/2017 19:21  T:  08/17/2017 19:21

/ Yes

## 2022-05-20 NOTE — ERA
ER Documentation


Chief Complaint


Date/Time


DATE: 7/27/17 


TIME: 07:53


Chief Complaint


chest pain x 1 hr





HPI


This is a 76-year-old male who is a very poor historian who presents with chest 

pain.  The patient states that he has left-sided chest pain that is very sharp 

and only exacerbated when moving his chest.  The patient had a recent 

hospitalization and discharge within the last several days for chest pain with 

a workup that was unremarkable other than a EGD showing an esophageal ring.  He 

currently takes Eliquis for A. fib.  The patient does have coronary disease and 

2 stents.  He states his chest pain feels different than his cardiac chest 

pain.  He thinks that he may have fallen out of his chair yesterday evening.  

His wife states that he fell asleep and fell out of the chair but he does not 

recall this event.  He states that he was watching a baseball game and next 

thing he knows he was on the ground.  He denies hitting his head.  He is unsure 

if he had his chest.  He has no other complaints.





ROS


All systems reviewed and are negative except as per history of present illness.





Medications


Home Meds


Active Scripts


Apixaban* (Eliquis*) 5 Mg Tablet, 5 MG PO BID for 30 Days, #60 TAB


   Prov:ASAD VAUGHN MD         7/22/17


Simethicone* (Mylicon*) 80 Mg Tab, 80 MG GTB QID Y for DISTENSION/GAS/BLOATING 

for 30 Days, #120 TAB


   Prov:ASAD VAUGHN MD         7/22/17


Amiodarone Hcl* (Amiodarone Hcl*) 400 Mg Tablet, 400 MG PO DAILY for 30 Days, #

30 TAB


   Prov:ASAD VAUGHN MD         7/22/17


Aspirin* (Aspirin* EC) 81 Mg Tablet., 81 MG PO DAILY for 30 Days, #30


   Prov:ASAD VAUGHN MD         6/3/17


Reported Medications


Sucralfate* (Carafate*) 1 Gm Tab, 1 GM PO BID, TAB


   7/19/17


Pantoprazole* (Protonix*) 40 Mg Tablet., 40 MG PO DAILY, TAB


   7/19/17


Metoprolol Tartrate* (Lopressor*) 100 Mg Tablet, 100 MG PO BID, #60 TAB


   7/19/17


Simvastatin* (Zocor*) 40 Mg Tablet, 40 MG PO QHS, #30 TAB


   4/5/17


Discontinued Reported Medications


Diltiazem Hcl* (Diltiazem XT) 180 Mg Capsule.er, 180 MG PO BID, #30 CAP


   7/19/17


Discontinued Scripts


Amiodarone Hcl* (Amiodarone Hcl*) 200 Mg Tablet, 400 MG PO QAM for 30 Days, #30 

TAB


   Prov:ASAD VAUGHN MD         7/22/17


Apixaban* (Eliquis*) 5 Mg Tablet, 5 MG PO BID for 30 Days, #60 TAB


   Prov:ASAD VAUGHN MD         11/20/16





Allergies


Allergies:  


Coded Allergies:  


     No Known Allergies (Verified  Allergy, Unknown, 7/27/17)





PMhx/Soc


History of Surgery:  Yes (CARDIAC STENTS)


Anesthesia Reaction:  No


Hx Neurological Disorder:  Yes


Hx Respiratory Disorders:  Yes


Hx Cardiac Disorders:  Yes (A-FIB, HTN)


Hx Psychiatric Problems:  No


Hx Miscellaneous Medical Probl:  No


Hx Alcohol Use:  Yes (OCCASIONAL)


Hx Substance Use:  No


Hx Tobacco Use:  Yes





FmHx


Family History:  No diabetes





Physical Exam


Vitals





Vital Signs








  Date Time  Temp Pulse Resp B/P Pulse Ox O2 Delivery O2 Flow Rate FiO2


 


7/27/17 11:21  95 17 132/94 94 Room Air  


 


7/27/17 09:02  92 10 139/81 95 Room Air  


 


7/27/17 08:11       0 


 


7/27/17 07:24 98.2 87 18 178/103 96   








Physical Exam


General: Well developed, well nourished, no acute distress


Head: Normocephalic, atraumatic.


Eyes: Pupils equally reactive, EOM intact


ENT: Moist mucous membranes


Neck: Supple, no lymphadenopathy


Respiratory: Lungs clear bilaterally, no distress, very reproducible left-sided 

anterior chest wall tenderness around rib 4, no crepitus


Cardiovascular: RRR, no murmurs, rubs, or gallops


Abdominal: Soft, non-tender, non-distended, no peritoneal signs


: Deferred


MSK: No edema, no unilateral swelling, 5/5 strength


Neurologic: Alert and oriented, moving all extremities, normal speech, no focal 

weakness, no cerebellar signs


Skin: No rash


Psych: Normal mood


Result Diagram:  


7/27/17 0803 7/27/17 0803





Results 24 hrs





 Laboratory Tests








Test


  7/27/17


08:03


 


White Blood Count 3.010^3/ul 


 


Red Blood Count 4.9310^6/ul 


 


Hemoglobin 15.5g/dl 


 


Hematocrit 47.1% 


 


Mean Corpuscular Volume 95.5fl 


 


Mean Corpuscular Hemoglobin 31.4pg 


 


Mean Corpuscular Hemoglobin


Concent 32.9g/dl 


 


 


Red Cell Distribution Width 16.0% 


 


Platelet Count 12698^3/UL 


 


Mean Platelet Volume 10.7fl 


 


Neutrophils % 66.9% 


 


Lymphocytes % 14.7% 


 


Monocytes % 16.7% 


 


Eosinophils % 0.3% 


 


Basophils % 0.7% 


 


Nucleated Red Blood Cells % 0.0/100WBC 


 


Neutrophils # 2.010^3/ul 


 


Lymphocytes # 0.410^3/ul 


 


Monocytes # 0.510^3/ul 


 


Eosinophils # 0.010^3/ul 


 


Basophils # 0.010^3/ul 


 


Nucleated Red Blood Cells # 0.010^3/ul 


 


Prothrombin Time 14.5Sec 


 


Prothrombin Time Ratio 1.1 


 


INR International Normalized


Ratio 1.13 


 


 


Activated Partial


Thromboplast Time 33.0Sec 


 


 


Sodium Level 152mmol/L 


 


Potassium Level 3.5mmol/L 


 


Chloride Level 109mmol/L 


 


Carbon Dioxide Level 30mmol/L 


 


Anion Gap 17 


 


Blood Urea Nitrogen 9mg/dl 


 


Creatinine 0.76mg/dl 


 


Glucose Level 89mg/dl 


 


Calcium Level 8.6mg/dl 


 


Troponin I < 0.012ng/ml 








 Current Medications








 Medications


  (Trade)  Dose


 Ordered  Sig/Graciela


 Route


 PRN Reason  Start Time


 Stop Time Status Last Admin


Dose Admin


 


 Morphine Sulfate


  (morphine)  4 mg  ONCE  STAT


 IV


   7/27/17 07:44


 7/27/17 07:46 DC 7/27/17 08:06


 


 


 Ondansetron HCl


  (Zofran Inj)  4 mg  ONCE  STAT


 IV


   7/27/17 07:44


 7/27/17 07:46 DC 7/27/17 08:04


 


 


 IV Flush 10 ml  10 ml  STK-MED ONCE


 .ROUTE


   7/27/17 09:54


 7/27/17 09:55 DC  


 


 


 Sodium Chloride  100 ml @ ud  STK-MED ONCE


 .ROUTE


   7/27/17 09:54


 7/27/17 09:55 DC  


 


 


 Iohexol


  (Omnipaque)  100 ml @ ud  STK-MED ONCE


 .ROUTE


   7/27/17 09:54


 7/27/17 09:55 DC  


 


 


 Iohexol


  (Omnipaque 350mg/


 ml)  50 ml  STK-MED ONCE


 .ROUTE


   7/27/17 09:55


 7/27/17 09:56 DC  


 


 


 Ondansetron HCl


  (Zofran Inj)  4 mg  ER BRIDGE PRN


 IV


 NAUSEA AND/OR VOMITING  7/27/17 11:00


 7/28/17 10:59   


 


 


 Acetaminophen


  (Tylenol Tab)  650 mg  ER BRIDGE PRN


 PO


 MILD PAIN/FEVER  7/27/17 11:00


 7/28/17 10:59   


 











Procedures/MDM


EKG, MONITORS, & DIAGNOSTIC IMAGING:


EKG: I reviewed and interpreted a 12-lead EKG. 


Rhythm: Atrial fibrillation, rate controlled


Ectopy: None


Intervals: No abnormalities


ST segments: J-point elevation in lead V1 that is similar to J-point elevation 

on prior EKGs


T waves: No contiguous inversions








Repeat EKG


EKG: I reviewed and interpreted a 12-lead EKG. 


Rhythm: Atrial fibrillation, rate controlled


Ectopy: None


Intervals: No abnormalities


ST segments: J-point elevation in lead V1 that is similar to J-point elevation 

on prior EKGs


T waves: No contiguous inversions





cxr


 


IMPRESSION:


1. Probable new subtle patchy opacity at the left lung base, compatible with 

atelectasis and / or early consolidation.


2.  Stable bilateral pleural calcifications, suggestive of prior asbestos 

exposure.


3.  Mild cardiomegaly.


4.  Thoracic aortic atherosclerotic disease.


 


RPTAT: PP





CTbrain:


IMPRESSION:


1.  No evidence of acute intracranial pathology. 


2.  Chronic right PCA territory infarct.


3.  Mild generalized volume loss, with mild chronic small vessel ischemic 

changes.


 


 


RPTAT: VV





CTA:


IMPRESSION:


 


1.  No evidence for pulmonary embolism.


2.  Multichamber cardiomegaly particularly the left atrium is dilated.


3.  Bilateral calcified pleural plaques suggestive of asbestos exposure.  No 

evidence of interstitial lung disease.


4.  Dense coronary artery calcifications and scattered aortic vascular 

calcifications.


5.  No mass, lymphadenopathy or acute infiltrates.


6.  Mild emphysema.


7.  Mild diffuse bronchial wall thickening more evident in the lung bases in 

keeping with nonspecific airway inflammation.


 


LAB INTERPRETATION:


Negative troponin





MEDICAL DECISION MAKING:


The patient presents with chest pain.  The patient does have a history of 

cardiac disease, is anticoagulated for atrial fibrillation.  His chest pain 

seems very reproducible and likely secondary to rib contusion.  However, the 

patient does have cardiac history and would benefit from serial EKGs and 

troponins.  The more concerning history is that the patient may have had a 

syncopal episode.  However his wife states that he was sleepy and may have 

fallen asleep.  This seems to be the more probable history that the patient 

likely fell asleep in his chair and woke up on the ground.  However, given his 

cardiac history I cannot rule out arrhythmia or alternative cause of syncope.  

Additionally, given that the patient is anticoagulated CT brain is appropriate 

despite his report of not hitting his head.





I will discuss the case with his primary care physician Dr. Vaughn 

however I would have a very low threshold for hospitalization given his age, 

comorbidities and risk factors for more serious etiology.





ER COURSE:


The patient's pain is well controlled.  However the patient's chest x-ray 

showed slight abnormality could not rule out pulmonary embolism versus 

pulmonary contusion.  CT was ordered.  No evidence of acute process.  Given 

that the patient cannot clearly identify if he had a syncopal episode, given 

the patient's age and comorbidities I strongly recommend hospitalization and 

Dr. Vaughn agrees.





I kept the patient and/or family informed of laboratory and diagnostic imaging 

results throughout the emergency room course.





DISPOSITION PLAN:


Telemetry admission to monitor for syncope





CONSULTATION:


Accepting care team and consultations:


I discussed the current laboratory data, diagnostic imaging and emergency care 

provided.


Admitting team: Dr. Vaughn


Admitting team indication: Insurance directed





Departure


Diagnosis:  


 Primary Impression:  


 Chest wall contusion


 Qualified Code:  S20.212A - Chest wall contusion, left, initial encounter


 Additional Impressions:  


 Syncope


 Qualified Code:  R55 - Syncope, unspecified syncope type


 Atrial fibrillation


 Qualified Code:  I48.91 - Atrial fibrillation, unspecified type


Condition:  CRUZITO Cuba MD Jul 27, 2017 07:56 Tarsorrhaphy Text: A tarsorrhaphy was performed using Frost sutures.

## 2023-06-30 NOTE — PN
Date/Time of Note


Date/Time of Note


DATE: 7/21/17 


TIME: 08:32


SUBJECTIVE: 


Patient awoke this morning with similar complaints of epigastric discomfort 

belch several times and felt better had no severe dyspnea or diaphoresis.  Has 

not noticed that his heart is irregular denies any PND orthopnea or 

lightheadedness or palpitations.  Is scheduled for endoscopy today.





OBJECTIVE:   


Vital signs please see chart.





HEENT; no JVD, no HJR, carotids 2 over 4+ without bruits.


Chest: Clear to auscultation and percussion, no rales, wheezes or rhonchi.


Cardiac: S1, S2 with normal physiologic splitting, 1/6 systolic ejection murmur

, no rub click or diastolic murmur noted.


Abdominal: Bowel sounds positive, soft nontender, no abdominal bruit noted, no 

hepatosplenomegaly.


Extremities: No cyanosis, clubbing, or edema. Negative Homans sign or palpable 

cords.


Pulses: 2/4 pulses diffusely no bruits noted.





LABORATORY STUDIES;


EKG from this morning revealed atrial fibrillation 105 beats a minute 

nonspecific ST abnormality no change however from prior tracings.


Telemetry revealed continued atrial fibrillation rates between 80 up to 110 bpm 

no significant bradycardia.


Echocardiogram revealed severe concentric left ventricular hypertrophy ejection 

fraction 65%, no outflow tract gradient at rest, mild to moderate aortic 

stenosis with mild aortic regurgitation with calcified trileaflet aortic valve, 

mild mitral stenosis and mild mitral regurgitation with marked mitral annular 

calcification, mild tricuspid regurgitation with borderline pulmonary 

hypertension pulmonary pressures 35-40, no masses or pericardial effusion, 

marked left atrial enlargement with elevated left atrial pressure.





ASSESSMENT:  


1.  Continued epigastric pains unclear etiology for endoscopy today.


2.  History of paroxysmal atrial fibrillation now persistent amiodarone 

instituted.


3.  Mild to moderate aortic stenosis, mild aortic regurgitation


4.  Hyperlipidemia.


5.  Hypertension.


6.  Coronary artery disease 2 prior stents last in November 2016.


7.  Hypertrophic cardiomyopathy without obstructive component at rest.


8.  History of CVA without residual.


At this time awaiting endoscopy report, awaiting laboratory studies.  We will 

continue amiodarone loading may need lower dose beta-blocker.  Continue 

anticoagulation patient with high chads score and chads VASC score.





PLAN:


1.  Continue amiodarone loading, may need titration of beta-blocker downwards.  

Patient still in persistent atrial fibrillation possible cardioversion in 

future.


2.  Awaiting endoscopy.


3.  Will need antibiotic dental prophylaxis with aortic stenosis insufficiency 

and mitral stenosis.











MARNIE NAJERA MD Jul 21, 2017 08:41 JUAN J

## 2024-01-08 NOTE — CONS
Assessment/Plan


Assessment/Plan


Hospital Course (Demo Recall)


1.Dyspnea on exertion- likely related to severe AS with HCM


- gentle diuresis 


- cont bp control, stable on current regimen 





2.  Atrial fibrillation. currently nsr, on eliquis for prophy. on metoprolol and


dilt





3. AS- severe on heart cath. awaiting outpt eval for TAVR/ETOH ablation vs. 


AVR/myomectomy. has outpt eval pending next week 





4. Hypertrophic cardiomyopathy- likely related to severe AS


- cont bp control, bb





5.  Hypertension


- elevated on admit will observe and titrate meds if needed 





6. Hyperlipidemia


- on statin.





7.CAD hx- stable, neg serial trop


- contstatin/bb





Consultation Date/Type/Reason


Admit Date/Time


Mar 4, 2019 at 13:09


Initial Consult Date


3/2/19


Type of Consult


Cardiology


Requesting Provider:  ASAD VAUGHN MD


Date/Time of Note


DATE: 3/4/19 


TIME: 23:36





Exam/Review of Systems


Exam


Vitals





Vital Signs


  Date      Temp  Pulse  Resp  B/P (MAP)   Pulse Ox  O2          O2 Flow    FiO2


Time                                                 Delivery    Rate


    3/4/19           71


     20:34


    3/4/19  97.7           18      124/68        98


     20:02                           (86)


    3/4/19                                           Nasal             2.0


     20:00                                           Cannula








Intake and Output





3/3/19


3/3/19


3/4/19





1515:00


23:00


07:00





IntakeIntake Total


930 ml


500 ml





OutputOutput Total


1400 ml


650 ml





BalanceBalance


-470 ml


-150 ml














Results


Result Diagram:  


3/4/19 0555                                                                     


          3/4/19 0555





Results 24hrs





Laboratory Tests


        Test
                                3/4/19
05:55  3/4/19
14:32


        White Blood Count                         3.1  #L


        Red Blood Count                           4.51  L


        Hemoglobin                                13.1  L


        Hematocrit                                41.1  L


        Mean Corpuscular Volume                    91.1


        Mean Corpuscular Hemoglobin                29.0


        Mean Corpuscular Hemoglobin
Concent      31.9  L
  



        Red Cell Distribution Width               15.5  H


        Platelet Count                              99  L


        Mean Platelet Volume                      11.7  H


        Immature Granulocytes %                  0.700  H


        Neutrophils %                              73.7


        Lymphocytes %                             11.5  L


        Monocytes %                               13.8  H


        Eosinophils %                               0.0


        Basophils %                                 0.3


        Nucleated Red Blood Cells %                 0.0


        Immature Granulocytes #                   0.020


        Neutrophils #                               2.3


        Lymphocytes #                              0.4  L


        Monocytes #                                 0.4


        Eosinophils #                               0.0


        Basophils #                                 0.0


        Nucleated Red Blood Cells #                 0.0


        Sodium Level                                143


        Potassium Level                            3.4  L


        Chloride Level                               99


        Carbon Dioxide Level                        32  H


        Anion Gap                                    12


        Blood Urea Nitrogen                          19


        Creatinine                                 0.79


        Est Glomerular Filtrat Rate
mL/min     
           



        Glucose Level                                97


        Calcium Level                               8.9


        Ammonia                                                    15








Medications


Medication





Current Medications


Apixaban (Eliquis) 5 mg BID PO  Last administered on 3/4/19at 21:03; Admin Dose 


5 MG;  Start 3/1/19 at 21:00


Aspirin (Halfprin) 81 mg DAILY PO  Last administered on 3/4/19at 08:27; Admin 


Dose 81 MG;  Start 3/2/19 at 09:00


Buspirone HCl (Buspar) 5 mg BID PO  Last administered on 3/4/19at 21:03; Admin 


Dose 5 MG;  Start 3/1/19 at 21:00


Diltiazem HCl (Cardizem Cd) 240 mg DAILY PO  Last administered on 3/4/19at 


08:28; Admin Dose 240 MG;  Start 3/1/19 at 15:30


Lorazepam (Ativan) 0.5 mg Q6H  PRN PO ANXIETY Last administered on 3/3/19at 


23:03; Admin Dose 0.5 MG;  Start 3/1/19 at 15:30


Metoprolol Succinate (Toprol Xl) 100 mg DAILY PO  Last administered on 3/4/19at 


08:27; Admin Dose 100 MG;  Start 3/1/19 at 15:30


Pantoprazole (Protonix Tab) 40 mg DAILY PO  Last administered on 3/4/19at 08:27;


Admin Dose 40 MG;  Start 3/1/19 at 15:30


Simethicone (Mylicon) 80 mg Q6H  PRN PO DISTENSION/GAS/BLOATING Last 


administered on 3/2/19at 04:32; Admin Dose 80 MG;  Start 3/1/19 at 15:30


Sucralfate (Carafate) 1 gm BID PO  Last administered on 3/4/19at 21:03; Admin 


Dose 1 GM;  Start 3/1/19 at 21:00


Atorvastatin Calcium (Lipitor) 40 mg HS PO  Last administered on 3/4/19at 21:03;


Admin Dose 40 MG;  Start 3/1/19 at 21:00


IV Flush (NS 3 ml) 3 ml PER PROTOCOL IV ;  Start 3/1/19 at 16:00


Ondansetron HCl (Zofran Inj) 4 mg Q6H  PRN IV NAUSEA/VOMITING Last administered 


on 3/2/19at 15:09; Admin Dose 4 MG;  Start 3/1/19 at 16:00


Acetaminophen (Tylenol Tab) 650 mg Q6H  PRN PO .PAIN 1-3 OR TEMP Last 


administered on 3/1/19at 23:08; Admin Dose 650 MG;  Start 3/1/19 at 16:00


Docusate Sodium (Colace) 100 mg Q12H  PRN PO .CONSTIPATION;  Start 3/1/19 at 


16:00


Zolpidem Tartrate (Ambien) 5 mg QHS  PRN PO .INSOMNIA Last administered on 


3/4/19at 21:06; Admin Dose 5 MG;  Start 3/1/19 at 16:00


Morphine Sulfate (morphine) 4 mg Q4H  PRN IV SEVERE PAIN LEVEL 7-10 Last 


administered on 3/2/19at 04:38; Admin Dose 4 MG;  Start 3/2/19 at 00:01


Dicyclomine HCl (Bentyl) 10 mg TID  PRN PO ABDOMINAL PAIN;  Start 3/2/19 at 


05:42


Clonidine (Catapres) 0.1 mg Q4H  PRN PO ELEVATED BLOOD PRESSURE Last 


administered on 3/2/19at 06:03; Admin Dose 0.1 MG;  Start 3/2/19 at 05:43


Lorazepam (Ativan) 0.5 mg Q4H  PRN IV mild trembling and anxiety Last 


administered on 3/4/19at 10:33; Admin Dose 0.5 MG;  Start 3/4/19 at 10:00











STEVAN DURANT              Mar 4, 2019 23:36 Need for prophylactic measure

## 2024-02-25 NOTE — HP
DATE OF ADMISSION: 03/23/2019

 

HISTORY OF PRESENT ILLNESS:  The patient is a 78-year-old gentleman who is well known to me from prev
ious followup, presenting with increasing shortness of breath, tremors with increasing anxiety.  The 
patient was evaluated in the emergency room and was admitted.  The patient was last hospitalized abou
t 2 weeks ago with atypical chest pains.  The patient has complicated cardiac history with history of
 atrial fibrillation with recurrent rapid ventricular response with moderate to severe aortic stenosi
s, history of hypertrophic obstructive cardiomyopathy, history of coronary artery disease, status pos
t PCI.

 

MEDICATIONS:  Include:

1.  Diltiazem.

2.  Metoprolol.

3.  BuSpar.

4.  Aspirin.

5.  Eliquis.

6.  Carafate.

7.  Lorazepam.

 

SOCIAL HISTORY:  The patient also tends to drink on a regular basis.  States that he has completely c
ut it out, but he had 1 drink.  During the course of last hospitalization, the patient did have evide
nce of withdrawal, improved with benzodiazepines.

 

REVIEW OF SYSTEMS:

HEAD:  No history of headaches, focal weakness or numbness.

EYES:  No blurry vision or glaucoma.  Status post prior stroke which resolved completely.

ENT:  Noncontributory.

NECK:  No history of thyroid disease.

CHEST:  No bronchitis, hay fever or asthma.  The patient does have chest x-ray evidence of calcified 
pleural plaques, query asbestosis exposure in the past, prior smoker for 10 years, quit about 40 year
s back.  No history of TB or hemoptysis.

HEART:  As above.

GASTROINTESTINAL:  History of GERD and diverticulosis, history of cholelithiasis.

GENITOURINARY:  No dysuria or kidney stones.

HEMATOLOGIC:  History of chronic leukopenia without any evidence of myelodysplasia.

GENERAL:  No history of weight loss or weight gain.

HABITS:  As above.

SKIN:  No history of any moles which have changed in color recently.

 

PHYSICAL EXAMINATION:

GENERAL:  The patient is an average-built male who is presently somewhat tremulous.

VITAL SIGNS:  Temperature 98.2, blood pressure 120/63, O2 sat is 98%.

HEENT:  Head is normocephalic.  Mild pallor without cyanosis.  Tongue is moist.

NECK:  Supple.  JVD is not increased.

CHEST:  Exam revealed decreased breath sounds at bases.

HEART:  S1, S2 heard.  No definite gallops.

ABDOMEN:  Obese, nontender.  No hepatosplenomegaly.

EXTREMITIES:  No edema.  Pedals are poorly palpable.  Homans sign is negative.

 

LABORATORY DATA:  WBC count 2.2 on admission, which dropped to 1.8 today with platelet count of 92,00
0.  Sodium 145, potassium 3.9.  Magnesium is 2 today.  Troponin 0.01, 0.038.

 

IMPRESSION:

1.  Shortness of breath with increased tremulousness.  Likely, the patient is having withdrawal from 
alcohol.

2.  Severe aortic stenosis.

3.  Hypertrophic subaortic obstructive cardiomyopathy.

4.  Atrial fibrillation.

5.  Chronic leukopenia which has worsened recently with thrombocytopenia.

6.  Cholelithiasis.

7.  Gastroesophageal reflux disease.

8.  Status post prior cerebrovascular accident.

 

PLAN:  We will start the patient on IV hydration along with intravenous thiamine.  Continue intraveno
us benzodiazepines for withdrawal.  Cardiology consultation will be requested if necessary.

 

 

Dictated By: ASAD VAUGHN MD, SR/NTS

DD:    03/24/2019 14:48:50

DT:    03/24/2019 16:22:53

Conf#: [id number]

DID#:  [id number] Patient